# Patient Record
Sex: MALE | Race: BLACK OR AFRICAN AMERICAN | URBAN - METROPOLITAN AREA
[De-identification: names, ages, dates, MRNs, and addresses within clinical notes are randomized per-mention and may not be internally consistent; named-entity substitution may affect disease eponyms.]

---

## 2022-03-31 ENCOUNTER — INPATIENT (INPATIENT)
Facility: HOSPITAL | Age: 32
LOS: 3 days | Discharge: ANOTHER HOSPITAL | DRG: 305 | End: 2022-04-04
Attending: INTERNAL MEDICINE | Admitting: INTERNAL MEDICINE
Payer: MEDICAID

## 2022-03-31 VITALS
OXYGEN SATURATION: 95 % | RESPIRATION RATE: 20 BRPM | WEIGHT: 315 LBS | HEART RATE: 109 BPM | DIASTOLIC BLOOD PRESSURE: 112 MMHG | SYSTOLIC BLOOD PRESSURE: 188 MMHG | TEMPERATURE: 98 F

## 2022-03-31 DIAGNOSIS — E11.9 TYPE 2 DIABETES MELLITUS WITHOUT COMPLICATIONS: ICD-10-CM

## 2022-03-31 DIAGNOSIS — N17.9 ACUTE KIDNEY FAILURE, UNSPECIFIED: ICD-10-CM

## 2022-03-31 DIAGNOSIS — Z29.9 ENCOUNTER FOR PROPHYLACTIC MEASURES, UNSPECIFIED: ICD-10-CM

## 2022-03-31 DIAGNOSIS — I10 ESSENTIAL (PRIMARY) HYPERTENSION: ICD-10-CM

## 2022-03-31 DIAGNOSIS — I50.9 HEART FAILURE, UNSPECIFIED: ICD-10-CM

## 2022-03-31 DIAGNOSIS — R06.00 DYSPNEA, UNSPECIFIED: ICD-10-CM

## 2022-03-31 LAB
ALBUMIN SERPL ELPH-MCNC: 2.5 G/DL — LOW (ref 3.5–5)
ALP SERPL-CCNC: 85 U/L — SIGNIFICANT CHANGE UP (ref 40–120)
ALT FLD-CCNC: 54 U/L DA — SIGNIFICANT CHANGE UP (ref 10–60)
ANION GAP SERPL CALC-SCNC: 2 MMOL/L — LOW (ref 5–17)
AST SERPL-CCNC: 42 U/L — HIGH (ref 10–40)
BASOPHILS # BLD AUTO: 0.03 K/UL — SIGNIFICANT CHANGE UP (ref 0–0.2)
BASOPHILS NFR BLD AUTO: 0.3 % — SIGNIFICANT CHANGE UP (ref 0–2)
BILIRUB SERPL-MCNC: 0.6 MG/DL — SIGNIFICANT CHANGE UP (ref 0.2–1.2)
BUN SERPL-MCNC: 31 MG/DL — HIGH (ref 7–18)
CALCIUM SERPL-MCNC: 8.7 MG/DL — SIGNIFICANT CHANGE UP (ref 8.4–10.5)
CHLORIDE SERPL-SCNC: 113 MMOL/L — HIGH (ref 96–108)
CO2 SERPL-SCNC: 25 MMOL/L — SIGNIFICANT CHANGE UP (ref 22–31)
CREAT SERPL-MCNC: 1.83 MG/DL — HIGH (ref 0.5–1.3)
D DIMER BLD IA.RAPID-MCNC: 355 NG/ML DDU — HIGH
EGFR: 50 ML/MIN/1.73M2 — LOW
EOSINOPHIL # BLD AUTO: 0.18 K/UL — SIGNIFICANT CHANGE UP (ref 0–0.5)
EOSINOPHIL NFR BLD AUTO: 2 % — SIGNIFICANT CHANGE UP (ref 0–6)
GLUCOSE BLDC GLUCOMTR-MCNC: 176 MG/DL — HIGH (ref 70–99)
GLUCOSE SERPL-MCNC: 298 MG/DL — HIGH (ref 70–99)
HCT VFR BLD CALC: 33 % — LOW (ref 39–50)
HGB BLD-MCNC: 11.7 G/DL — LOW (ref 13–17)
IMM GRANULOCYTES NFR BLD AUTO: 0.3 % — SIGNIFICANT CHANGE UP (ref 0–1.5)
LYMPHOCYTES # BLD AUTO: 1.6 K/UL — SIGNIFICANT CHANGE UP (ref 1–3.3)
LYMPHOCYTES # BLD AUTO: 18.1 % — SIGNIFICANT CHANGE UP (ref 13–44)
MCHC RBC-ENTMCNC: 31.5 PG — SIGNIFICANT CHANGE UP (ref 27–34)
MCHC RBC-ENTMCNC: 35.5 GM/DL — SIGNIFICANT CHANGE UP (ref 32–36)
MCV RBC AUTO: 88.7 FL — SIGNIFICANT CHANGE UP (ref 80–100)
MONOCYTES # BLD AUTO: 0.65 K/UL — SIGNIFICANT CHANGE UP (ref 0–0.9)
MONOCYTES NFR BLD AUTO: 7.4 % — SIGNIFICANT CHANGE UP (ref 2–14)
NEUTROPHILS # BLD AUTO: 6.33 K/UL — SIGNIFICANT CHANGE UP (ref 1.8–7.4)
NEUTROPHILS NFR BLD AUTO: 71.9 % — SIGNIFICANT CHANGE UP (ref 43–77)
NRBC # BLD: 0 /100 WBCS — SIGNIFICANT CHANGE UP (ref 0–0)
NT-PROBNP SERPL-SCNC: 2900 PG/ML — HIGH (ref 0–125)
PLATELET # BLD AUTO: 269 K/UL — SIGNIFICANT CHANGE UP (ref 150–400)
POTASSIUM SERPL-MCNC: 4.6 MMOL/L — SIGNIFICANT CHANGE UP (ref 3.5–5.3)
POTASSIUM SERPL-SCNC: 4.6 MMOL/L — SIGNIFICANT CHANGE UP (ref 3.5–5.3)
PROT SERPL-MCNC: 6.6 G/DL — SIGNIFICANT CHANGE UP (ref 6–8.3)
RAPID RVP RESULT: SIGNIFICANT CHANGE UP
RBC # BLD: 3.72 M/UL — LOW (ref 4.2–5.8)
RBC # FLD: 11.8 % — SIGNIFICANT CHANGE UP (ref 10.3–14.5)
SARS-COV-2 RNA SPEC QL NAA+PROBE: SIGNIFICANT CHANGE UP
SODIUM SERPL-SCNC: 140 MMOL/L — SIGNIFICANT CHANGE UP (ref 135–145)
TROPONIN I, HIGH SENSITIVITY RESULT: 119.2 NG/L — HIGH
TROPONIN I, HIGH SENSITIVITY RESULT: 83.6 NG/L — HIGH
TROPONIN I, HIGH SENSITIVITY RESULT: 88.6 NG/L — HIGH
WBC # BLD: 8.82 K/UL — SIGNIFICANT CHANGE UP (ref 3.8–10.5)
WBC # FLD AUTO: 8.82 K/UL — SIGNIFICANT CHANGE UP (ref 3.8–10.5)

## 2022-03-31 PROCEDURE — 71045 X-RAY EXAM CHEST 1 VIEW: CPT | Mod: 26

## 2022-03-31 PROCEDURE — 93010 ELECTROCARDIOGRAM REPORT: CPT

## 2022-03-31 PROCEDURE — 99285 EMERGENCY DEPT VISIT HI MDM: CPT

## 2022-03-31 RX ORDER — FUROSEMIDE 40 MG
40 TABLET ORAL
Refills: 0 | Status: DISCONTINUED | OUTPATIENT
Start: 2022-03-31 | End: 2022-04-04

## 2022-03-31 RX ORDER — LABETALOL HCL 100 MG
5 TABLET ORAL ONCE
Refills: 0 | Status: COMPLETED | OUTPATIENT
Start: 2022-03-31 | End: 2022-03-31

## 2022-03-31 RX ORDER — INSULIN DETEMIR 100/ML (3)
30 INSULIN PEN (ML) SUBCUTANEOUS ONCE
Refills: 0 | Status: DISCONTINUED | OUTPATIENT
Start: 2022-03-31 | End: 2022-03-31

## 2022-03-31 RX ORDER — ASPIRIN/CALCIUM CARB/MAGNESIUM 324 MG
81 TABLET ORAL DAILY
Refills: 0 | Status: DISCONTINUED | OUTPATIENT
Start: 2022-03-31 | End: 2022-04-04

## 2022-03-31 RX ORDER — ATORVASTATIN CALCIUM 80 MG/1
40 TABLET, FILM COATED ORAL AT BEDTIME
Refills: 0 | Status: DISCONTINUED | OUTPATIENT
Start: 2022-03-31 | End: 2022-04-04

## 2022-03-31 RX ORDER — INSULIN LISPRO 100/ML
VIAL (ML) SUBCUTANEOUS
Refills: 0 | Status: DISCONTINUED | OUTPATIENT
Start: 2022-03-31 | End: 2022-04-04

## 2022-03-31 RX ORDER — INSULIN GLARGINE 100 [IU]/ML
30 INJECTION, SOLUTION SUBCUTANEOUS ONCE
Refills: 0 | Status: COMPLETED | OUTPATIENT
Start: 2022-03-31 | End: 2022-03-31

## 2022-03-31 RX ORDER — INSULIN GLARGINE 100 [IU]/ML
30 INJECTION, SOLUTION SUBCUTANEOUS AT BEDTIME
Refills: 0 | Status: DISCONTINUED | OUTPATIENT
Start: 2022-03-31 | End: 2022-04-01

## 2022-03-31 RX ORDER — FUROSEMIDE 40 MG
40 TABLET ORAL ONCE
Refills: 0 | Status: COMPLETED | OUTPATIENT
Start: 2022-03-31 | End: 2022-03-31

## 2022-03-31 RX ORDER — HYDRALAZINE HCL 50 MG
10 TABLET ORAL ONCE
Refills: 0 | Status: COMPLETED | OUTPATIENT
Start: 2022-03-31 | End: 2022-03-31

## 2022-03-31 RX ORDER — LISINOPRIL 2.5 MG/1
40 TABLET ORAL ONCE
Refills: 0 | Status: COMPLETED | OUTPATIENT
Start: 2022-03-31 | End: 2022-03-31

## 2022-03-31 RX ORDER — ENOXAPARIN SODIUM 100 MG/ML
40 INJECTION SUBCUTANEOUS EVERY 24 HOURS
Refills: 0 | Status: DISCONTINUED | OUTPATIENT
Start: 2022-03-31 | End: 2022-04-03

## 2022-03-31 RX ADMIN — Medication 10 MILLIGRAM(S): at 16:44

## 2022-03-31 RX ADMIN — ATORVASTATIN CALCIUM 40 MILLIGRAM(S): 80 TABLET, FILM COATED ORAL at 22:10

## 2022-03-31 RX ADMIN — INSULIN GLARGINE 30 UNIT(S): 100 INJECTION, SOLUTION SUBCUTANEOUS at 22:10

## 2022-03-31 RX ADMIN — Medication 5 MILLIGRAM(S): at 20:05

## 2022-03-31 RX ADMIN — INSULIN GLARGINE 30 UNIT(S): 100 INJECTION, SOLUTION SUBCUTANEOUS at 14:34

## 2022-03-31 RX ADMIN — ENOXAPARIN SODIUM 40 MILLIGRAM(S): 100 INJECTION SUBCUTANEOUS at 22:10

## 2022-03-31 RX ADMIN — Medication 40 MILLIGRAM(S): at 18:40

## 2022-03-31 RX ADMIN — Medication 10 MILLIGRAM(S): at 18:04

## 2022-03-31 RX ADMIN — LISINOPRIL 40 MILLIGRAM(S): 2.5 TABLET ORAL at 13:43

## 2022-03-31 NOTE — H&P ADULT - PROBLEM SELECTOR PLAN 1
Presents with dyspnea and cough  CHF vs CAD vs PNA   T1 83.6, T2 88.6, Follow T3  EKG: NSR  Pro BNP 2900  CXR: increased vascular markings Presents with dyspnea and cough  CHF vs CAD vs PNA   T1 83.6, T2 88.6, Follow T3  EKG: NSR  Pro BNP 2900  CXR: increased vascular markings  Follow dimer  Will start on Lasix 40IV BID  Aspirin and atorvastatin   Follow Echo   Dr. Evangelista consulted Presents with dyspnea and cough  CHF vs CAD vs PNA   T1 83.6, T2 88.6, Follow T3  EKG: NSR  Pro BNP 2900  CXR: increased vascular markings  Follow dimer  Will start on Lasix 40IV BID  Aspirin and atorvastatin   Follow Echo   Follow A1c, lipid panel and TSH  Dr. Evangelista consulted

## 2022-03-31 NOTE — ED ADULT NURSE NOTE - NS ED NURSE REPORT GIVEN TO FT
Bedside shift change report given to Yuan Ruiz 44 (oncoming nurse) by Arina Polanco RN (offgoing nurse). Report included the following information SBAR, Kardex, ED Summary, Intake/Output, MAR, Accordion, Recent Results and Med Rec Status. JACKIE Jordan

## 2022-03-31 NOTE — ED PROVIDER NOTE - CARE PLAN
1 Principal Discharge DX:	Dyspnea due to congestive heart failure  Secondary Diagnosis:	SUSANA (acute kidney injury)  Secondary Diagnosis:	Hyperglycemia

## 2022-03-31 NOTE — H&P ADULT - PROBLEM SELECTOR PLAN 2
BUN/Cr: 31/1.86  Likely prerenal   Avoid nephrotoxins  Continue to monitor BMP daily BUN/Cr: 31/1.86  Likely prerenal   Avoid nephrotoxins  Continue to monitor BMP daily  Follow UA

## 2022-03-31 NOTE — H&P ADULT - ASSESSMENT
33 y/o Patient with PMH of HTN, HLD, Varicose veins presents to the ED for SOB and cough.  Admitted for SOB

## 2022-03-31 NOTE — ED PROVIDER NOTE - CLINICAL SUMMARY MEDICAL DECISION MAKING FREE TEXT BOX
Patient with cough, sob that resolved, tachycardia, hypertension. Will check FSBS, core temp, EKG, labs, CXR, reassess.

## 2022-03-31 NOTE — H&P ADULT - ATTENDING COMMENTS
31 y/o Patient with PMH of HTN, HLD, Varicose veins presents to the ED for SOB and cough.  He states he was about to take off on a flight to [place] when he started having SOB and a cough that lasted several minutes, productive of clear sputum. He was taken off the flight and sent the ED. He has not been taking his medications for 2 months. He endorses chronic LE edema, Orthopnea and PND. Denies chest pain, fever, chills, abd pain, N/V/D, or any other complaints. Does not smoke, use ETOH or drugs. Supposed to take lisinopril 40mg, lantus 30 units, and lispro 15 units with meals.    # SUSPECT NEW ONSET CHF  # HYPERTENSIVE URGENCY, HTN  # B/L LE EDEMA - SUSPECT S/T CHF AND ?VARICOSE VEINS  - MONITOR ON TELEMETRY, F/U TROPONINS  - STARTED ON LASIX, F/U ECHOCARDIOGRAM  - F/U TSH/LIPID PANEL/HBA1C  - CARDIOLOGY CONSULT    # SUSANA - F/U UA, F/U BLADDER SCAN, MONITOR CR, AVOID NEPHROTOXIC AGENTS    # DM2, NONCOMPLIANT W/ INSULIN - F/U HBA1C, SSI + FS    # HLD - F/U LIPID PANEL    # GI AND DVT PPX

## 2022-03-31 NOTE — H&P ADULT - HISTORY OF PRESENT ILLNESS
33 y/o Patient with PMH of HTN, HLD, Varicose veins presents to the ED for SOB and cough.  He states he was about to take off on a flight to [place] when he started having SOB and a cough that lasted several minutes, productive of clear sputum. He was taken off the flight and sent the ED. He has not been taking his medications for 2 months. He endorses chronic LE edema, Orthopnea and PND. Denies chest pain, fever, chills, abd pain, N/V/D, or any other complaints. Does not smoke, use ETOH or drugs. Supposed to take lisinopril 40mg, lantus 30 units, and lispro 15 units with meals.

## 2022-03-31 NOTE — ED PROVIDER NOTE - OBJECTIVE STATEMENT
Patient reports he was on a flight about to take off. Had a coughing fit that lasted several minutes, productive of clear sputum. Felt short of breath while coughing. Feels better now. Found to be hypertensive here. No fever, ha, cp, ap, n/v/d. Has not taken his blood pressure medicine in 1 month and his insulin in 2 months. Supposed to take lisinopril 40mg, levemir 30 units, and Novolog 15 units with meals.

## 2022-03-31 NOTE — PATIENT PROFILE ADULT - FALL HARM RISK - UNIVERSAL INTERVENTIONS
Bed in lowest position, wheels locked, appropriate side rails in place/Call bell, personal items and telephone in reach/Instruct patient to call for assistance before getting out of bed or chair/Non-slip footwear when patient is out of bed/[place] to call system/Physically safe environment - no spills, clutter or unnecessary equipment/Purposeful Proactive Rounding/Room/bathroom lighting operational, light cord in reach

## 2022-03-31 NOTE — H&P ADULT - NSHPPHYSICALEXAM_GEN_ALL_CORE
LOS:     VITALS:   T(C): 36.6 (03-31-22 @ 18:56), Max: 36.8 (03-31-22 @ 12:27)  HR: 103 (03-31-22 @ 18:56) (101 - 109)  BP: 175/115 (03-31-22 @ 18:56) (175/115 - 194/146)  RR: 19 (03-31-22 @ 18:56) (19 - 20)  SpO2: 98% (03-31-22 @ 18:56) (95% - 98%)    GENERAL: NAD, lying in bed comfortably  HEAD:  Atraumatic, Normocephalic  EYES: EOMI, PERRLA, conjunctiva and sclera clear  ENT: Moist mucous membranes  NECK: Supple, No JVD  CHEST/LUNG: Clear to auscultation bilaterally; No rales, rhonchi, wheezing, or rubs. Unlabored respirations  HEART: Regular rate and rhythm; No murmurs, rubs, or gallops  ABDOMEN: BSx4; Soft, nontender, nondistended  EXTREMITIES:  LE edema, nonpitting 2+ Peripheral Pulses, brisk capillary refill. No clubbing, cyanosis  NERVOUS SYSTEM:  A&Ox3,  SKIN: No rashes or lesions

## 2022-04-01 LAB
A1C WITH ESTIMATED AVERAGE GLUCOSE RESULT: 9.4 % — HIGH (ref 4–5.6)
ALBUMIN SERPL ELPH-MCNC: 2.2 G/DL — LOW (ref 3.5–5)
ALP SERPL-CCNC: 71 U/L — SIGNIFICANT CHANGE UP (ref 40–120)
ALT FLD-CCNC: 41 U/L DA — SIGNIFICANT CHANGE UP (ref 10–60)
ANION GAP SERPL CALC-SCNC: 7 MMOL/L — SIGNIFICANT CHANGE UP (ref 5–17)
AST SERPL-CCNC: 36 U/L — SIGNIFICANT CHANGE UP (ref 10–40)
BILIRUB SERPL-MCNC: 0.7 MG/DL — SIGNIFICANT CHANGE UP (ref 0.2–1.2)
BUN SERPL-MCNC: 27 MG/DL — HIGH (ref 7–18)
CALCIUM SERPL-MCNC: 8.6 MG/DL — SIGNIFICANT CHANGE UP (ref 8.4–10.5)
CHLORIDE SERPL-SCNC: 106 MMOL/L — SIGNIFICANT CHANGE UP (ref 96–108)
CHOLEST SERPL-MCNC: 252 MG/DL — HIGH
CO2 SERPL-SCNC: 26 MMOL/L — SIGNIFICANT CHANGE UP (ref 22–31)
CREAT SERPL-MCNC: 1.65 MG/DL — HIGH (ref 0.5–1.3)
EGFR: 56 ML/MIN/1.73M2 — LOW
ESTIMATED AVERAGE GLUCOSE: 223 MG/DL — HIGH (ref 68–114)
GLUCOSE BLDC GLUCOMTR-MCNC: 102 MG/DL — HIGH (ref 70–99)
GLUCOSE BLDC GLUCOMTR-MCNC: 164 MG/DL — HIGH (ref 70–99)
GLUCOSE BLDC GLUCOMTR-MCNC: 189 MG/DL — HIGH (ref 70–99)
GLUCOSE BLDC GLUCOMTR-MCNC: 99 MG/DL — SIGNIFICANT CHANGE UP (ref 70–99)
GLUCOSE SERPL-MCNC: 89 MG/DL — SIGNIFICANT CHANGE UP (ref 70–99)
HCT VFR BLD CALC: 31.4 % — LOW (ref 39–50)
HDLC SERPL-MCNC: 62 MG/DL — SIGNIFICANT CHANGE UP
HGB BLD-MCNC: 10.9 G/DL — LOW (ref 13–17)
LIPID PNL WITH DIRECT LDL SERPL: 170 MG/DL — HIGH
MAGNESIUM SERPL-MCNC: 1.9 MG/DL — SIGNIFICANT CHANGE UP (ref 1.6–2.6)
MCHC RBC-ENTMCNC: 31.2 PG — SIGNIFICANT CHANGE UP (ref 27–34)
MCHC RBC-ENTMCNC: 34.7 GM/DL — SIGNIFICANT CHANGE UP (ref 32–36)
MCV RBC AUTO: 90 FL — SIGNIFICANT CHANGE UP (ref 80–100)
NON HDL CHOLESTEROL: 190 MG/DL — HIGH
NRBC # BLD: 0 /100 WBCS — SIGNIFICANT CHANGE UP (ref 0–0)
PHOSPHATE SERPL-MCNC: 4.9 MG/DL — HIGH (ref 2.5–4.5)
PLATELET # BLD AUTO: 272 K/UL — SIGNIFICANT CHANGE UP (ref 150–400)
POTASSIUM SERPL-MCNC: 4.3 MMOL/L — SIGNIFICANT CHANGE UP (ref 3.5–5.3)
POTASSIUM SERPL-SCNC: 4.3 MMOL/L — SIGNIFICANT CHANGE UP (ref 3.5–5.3)
PROT SERPL-MCNC: 5.8 G/DL — LOW (ref 6–8.3)
RBC # BLD: 3.49 M/UL — LOW (ref 4.2–5.8)
RBC # FLD: 11.8 % — SIGNIFICANT CHANGE UP (ref 10.3–14.5)
SODIUM SERPL-SCNC: 139 MMOL/L — SIGNIFICANT CHANGE UP (ref 135–145)
TRIGL SERPL-MCNC: 98 MG/DL — SIGNIFICANT CHANGE UP
TSH SERPL-MCNC: 3.51 UU/ML — SIGNIFICANT CHANGE UP (ref 0.34–4.82)
WBC # BLD: 7.38 K/UL — SIGNIFICANT CHANGE UP (ref 3.8–10.5)
WBC # FLD AUTO: 7.38 K/UL — SIGNIFICANT CHANGE UP (ref 3.8–10.5)

## 2022-04-01 PROCEDURE — 93970 EXTREMITY STUDY: CPT | Mod: 26

## 2022-04-01 PROCEDURE — 78582 LUNG VENTILAT&PERFUS IMAGING: CPT | Mod: 26

## 2022-04-01 RX ORDER — INSULIN GLARGINE 100 [IU]/ML
15 INJECTION, SOLUTION SUBCUTANEOUS AT BEDTIME
Refills: 0 | Status: DISCONTINUED | OUTPATIENT
Start: 2022-04-01 | End: 2022-04-04

## 2022-04-01 RX ORDER — HYDRALAZINE HCL 50 MG
10 TABLET ORAL ONCE
Refills: 0 | Status: COMPLETED | OUTPATIENT
Start: 2022-04-01 | End: 2022-04-01

## 2022-04-01 RX ADMIN — Medication 40 MILLIGRAM(S): at 05:01

## 2022-04-01 RX ADMIN — ATORVASTATIN CALCIUM 40 MILLIGRAM(S): 80 TABLET, FILM COATED ORAL at 21:24

## 2022-04-01 RX ADMIN — Medication 10 MILLIGRAM(S): at 22:19

## 2022-04-01 RX ADMIN — ENOXAPARIN SODIUM 40 MILLIGRAM(S): 100 INJECTION SUBCUTANEOUS at 21:25

## 2022-04-01 RX ADMIN — Medication 1: at 12:02

## 2022-04-01 RX ADMIN — INSULIN GLARGINE 30 UNIT(S): 100 INJECTION, SOLUTION SUBCUTANEOUS at 21:25

## 2022-04-01 RX ADMIN — Medication 81 MILLIGRAM(S): at 12:23

## 2022-04-01 RX ADMIN — Medication 40 MILLIGRAM(S): at 17:26

## 2022-04-01 NOTE — CONSULT NOTE ADULT - SUBJECTIVE AND OBJECTIVE BOX
C A R D I O L O G Y  *********************    DATE OF SERVICE: 04-01-22    HISTORY OF PRESENT ILLNESS: HPI:  31 y/o Patient with PMH of HTN, HLD, Varicose veins presents to the ED for SOB and cough.  He states he was about to take off on a flight to [place] when he started having SOB and a cough that lasted several minutes, productive of clear sputum. He was taken off the flight and sent the ED. He has not been taking his medications for 2 months. He endorses chronic LE edema, Orthopnea and PND. Denies chest pain, fever, chills, abd pain, N/V/D, or any other complaints. Does not smoke, use ETOH or drugs. Supposed to take lisinopril 40mg, lantus 30 units, and lispro 15 units with meals. (31 Mar 2022 19:04)      PAST MEDICAL & SURGICAL HISTORY:    Diabetes mellitus  Hypertension      MEDICATIONS:  MEDICATIONS  (STANDING):  aspirin enteric coated 81 milliGRAM(s) Oral daily  atorvastatin 40 milliGRAM(s) Oral at bedtime  enoxaparin Injectable 40 milliGRAM(s) SubCutaneous every 24 hours  furosemide   Injectable 40 milliGRAM(s) IV Push two times a day  insulin glargine Injectable (LANTUS) 30 Unit(s) SubCutaneous at bedtime  insulin lispro (ADMELOG) corrective regimen sliding scale   SubCutaneous three times a day before meals      Allergies    Keflex (Hives)    Intolerances        FAMILY HISTORY:    Non-contributary for premature coronary disease or sudden cardiac death    SOCIAL HISTORY:    [X ] Non-smoker  [ ] Smoker  [ ] Alcohol      REVIEW OF SYSTEMS:  [ ]chest pain  [ X ]shortness of breath  [  ]palpitations  [  ]syncope  [ ]near syncope [ ]upper extremity weakness   [ ] lower extremity weakness  [  ]diplopia  [  ]altered mental status   [  ]fevers  [ ]chills [ ]nausea  [ ]vomitting  [  ]dysphagia    [ ]abdominal pain  [ ]melena  [ ]BRBPR    [  ]epistaxis  [  ]rash    [ X]lower extremity edema        [X] All others negative	  [ ] Unable to obtain      LABS:	 	    CARDIAC MARKERS:        Troponin I, High Sensitivity Result: 119.2 ng/L (03-31-22 @ 20:01)  Troponin I, High Sensitivity Result: 88.6 ng/L (03-31-22 @ 16:19)  Troponin I, High Sensitivity Result: 83.6 ng/L (03-31-22 @ 13:37)                            10.9   7.38  )-----------( 272      ( 01 Apr 2022 07:43 )             31.4     Hb Trend: 10.9<--, 11.7<--    04-01    139  |  106  |  27<H>  ----------------------------<  89  4.3   |  26  |  1.65<H>    Ca    8.6      01 Apr 2022 07:43  Phos  4.9     04-01  Mg     1.9     04-01    TPro  5.8<L>  /  Alb  2.2<L>  /  TBili  0.7  /  DBili  x   /  AST  36  /  ALT  41  /  AlkPhos  71  04-01    Creatinine Trend: 1.65<--, 1.83<--    proBNP: Serum Pro-Brain Natriuretic Peptide: 2900 pg/mL (03-31 @ 13:37)      TSH: Thyroid Stimulating Hormone, Serum: 3.51 uU/mL (04-01 @ 07:43)        PHYSICAL EXAM:  T(C): 36.8 (04-01-22 @ 10:16), Max: 36.8 (03-31-22 @ 12:27)  HR: 95 (04-01-22 @ 10:16) (94 - 109)  BP: 178/111 (04-01-22 @ 10:16) (160/110 - 194/146)  RR: 16 (04-01-22 @ 10:16) (16 - 20)  SpO2: 98% (04-01-22 @ 10:16) (95% - 98%)  Wt(kg): --   BMI (kg/m2): 49.7 (03-31-22 @ 21:42)  I&O's Summary      Gen: Appears well in NAD  HEENT:  (-)icterus (-)pallor  CV: N S1 S2 1/6 CAMRON (+)2 Pulses B/l  Resp:  Clear to ausculatation B/L, normal effort  GI: (+) BS Soft, NT, ND  Lymph:  (+)Edema, (-)obvious lymphadenopathy  Skin: Warm to touch, Normal turgor  Psych: Appropriate mood and affect        ECG:  	Sinus Tach 105 BPM    RADIOLOGY:         CXR:  no infiltrate     ASSESSMENT/PLAN: 	32y Male  PMH of HTN, HLD, Varicose veins presents to the ED for SOB and cough positive trop elevated BNP.    - Elevated Ddimmer, VQ scan (-) f/u lower extremity dopplers  - Echo  - IV lasix to keep net negative  - will need ischemic eval pending echo.  If LV function is depressed plan for cath.    I once again thank you for allowing me to participate in the care of your patient.  If you have any questions or concerns please do not hesitate to contact me.    Zion Evangelista MD, [facility]  BEEPER [phone]       C A R D I O L O G Y  *********************    DATE OF SERVICE: 04-01-22    HISTORY OF PRESENT ILLNESS: HPI:  33 y/o Patient with PMH of HTN, HLD, Varicose veins presents to the ED for SOB and cough.  He states he was about to take off on a flight to [place] when he started having SOB and a cough that lasted several minutes, productive of clear sputum. He was taken off the flight and sent the ED. He has not been taking his medications for 2 months. He endorses chronic LE edema, Orthopnea and PND. Denies chest pain, fever, chills, abd pain, N/V/D, or any other complaints. Does not smoke, use ETOH or drugs. Supposed to take lisinopril 40mg, lantus 30 units, and lispro 15 units with meals. (31 Mar 2022 19:04)      PAST MEDICAL & SURGICAL HISTORY:    Diabetes mellitus  Hypertension      MEDICATIONS:  MEDICATIONS  (STANDING):  aspirin enteric coated 81 milliGRAM(s) Oral daily  atorvastatin 40 milliGRAM(s) Oral at bedtime  enoxaparin Injectable 40 milliGRAM(s) SubCutaneous every 24 hours  furosemide   Injectable 40 milliGRAM(s) IV Push two times a day  insulin glargine Injectable (LANTUS) 30 Unit(s) SubCutaneous at bedtime  insulin lispro (ADMELOG) corrective regimen sliding scale   SubCutaneous three times a day before meals      Allergies    Keflex (Hives)    Intolerances        FAMILY HISTORY:    Non-contributary for premature coronary disease or sudden cardiac death    SOCIAL HISTORY:    [X ] Non-smoker  [ ] Smoker  [ ] Alcohol      REVIEW OF SYSTEMS:  [ ]chest pain  [ X ]shortness of breath  [  ]palpitations  [  ]syncope  [ ]near syncope [ ]upper extremity weakness   [ ] lower extremity weakness  [  ]diplopia  [  ]altered mental status   [  ]fevers  [ ]chills [ ]nausea  [ ]vomitting  [  ]dysphagia    [ ]abdominal pain  [ ]melena  [ ]BRBPR    [  ]epistaxis  [  ]rash    [ X]lower extremity edema        [X] All others negative	  [ ] Unable to obtain      LABS:	 	    CARDIAC MARKERS:        Troponin I, High Sensitivity Result: 119.2 ng/L (03-31-22 @ 20:01)  Troponin I, High Sensitivity Result: 88.6 ng/L (03-31-22 @ 16:19)  Troponin I, High Sensitivity Result: 83.6 ng/L (03-31-22 @ 13:37)                            10.9   7.38  )-----------( 272      ( 01 Apr 2022 07:43 )             31.4     Hb Trend: 10.9<--, 11.7<--    04-01    139  |  106  |  27<H>  ----------------------------<  89  4.3   |  26  |  1.65<H>    Ca    8.6      01 Apr 2022 07:43  Phos  4.9     04-01  Mg     1.9     04-01    TPro  5.8<L>  /  Alb  2.2<L>  /  TBili  0.7  /  DBili  x   /  AST  36  /  ALT  41  /  AlkPhos  71  04-01    Creatinine Trend: 1.65<--, 1.83<--    proBNP: Serum Pro-Brain Natriuretic Peptide: 2900 pg/mL (03-31 @ 13:37)      TSH: Thyroid Stimulating Hormone, Serum: 3.51 uU/mL (04-01 @ 07:43)        PHYSICAL EXAM:  T(C): 36.8 (04-01-22 @ 10:16), Max: 36.8 (03-31-22 @ 12:27)  HR: 95 (04-01-22 @ 10:16) (94 - 109)  BP: 178/111 (04-01-22 @ 10:16) (160/110 - 194/146)  RR: 16 (04-01-22 @ 10:16) (16 - 20)  SpO2: 98% (04-01-22 @ 10:16) (95% - 98%)  Wt(kg): --   BMI (kg/m2): 49.7 (03-31-22 @ 21:42)  I&O's Summary      Gen: Appears well in NAD  HEENT:  (-)icterus (-)pallor  CV: N S1 S2 1/6 CAMRON (+)2 Pulses B/l  Resp:  Clear to ausculatation B/L, normal effort  GI: (+) BS Soft, NT, ND  Lymph:  (+)Edema, (-)obvious lymphadenopathy  Skin: Warm to touch, Normal turgor  Psych: Appropriate mood and affect        ECG:  	Sinus Tach 105 BPM    RADIOLOGY:         CXR:  no infiltrate     ASSESSMENT/PLAN: 	32y Male  PMH of HTN, HLD, Varicose veins presents to the ED for SOB and cough positive trop elevated BNP.    - Elevated Ddimmer, VQ scan (-) f/u lower extremity dopplers  - Echo  - IV lasix to keep net negative  - will need ischemic eval pending echo.  If LV function is depressed will need cath.    I once again thank you for allowing me to participate in the care of your patient.  If you have any questions or concerns please do not hesitate to contact me.    Zion Evangelista MD, [facility]  BEEPER [phone]

## 2022-04-01 NOTE — CHART NOTE - NSCHARTNOTEFT_GEN_A_CORE
EVENT: Vitals reviewed. /114.    HPI: 31 y/o Patient with PMH of HTN, HLD, Varicose veins presents to the ED for SOB and cough.  Admitted for SOB.    SUBJECTIVE: n/a    OBJECTIVE:  Vital Signs Last 24 Hrs  T(C): 36.7 (01 Apr 2022 21:02), Max: 36.8 (01 Apr 2022 10:16)  T(F): 98 (01 Apr 2022 21:02), Max: 98.2 (01 Apr 2022 10:16)  HR: 96 (01 Apr 2022 21:02) (94 - 96)  BP: 166/114 (01 Apr 2022 21:02) (161/81 - 178/111)  BP(mean): --  RR: 18 (01 Apr 2022 21:02) (16 - 18)  SpO2: 100% (01 Apr 2022 21:02) (98% - 100%)    FOCUSED PHYSICAL EXAM:  Neuro: awake, alert, oriented x 3. No neuro deficit  Cardiovascular: Pulses +2 B/L in lower and upper extremities, HR regular, BP stable, No edema.  Respiratory: Respirations regular, unlabored, breath sounds clear B/L.   GI: Abdomen soft, non-tender, positive bowel sounds.  : no bladder distention noted. No complaints at this time.  Skin: Dry, intact, no bruising, no diaphoresis.    LABS:                        10.9   7.38  )-----------( 272      ( 01 Apr 2022 07:43 )             31.4     04-01    139  |  106  |  27<H>  ----------------------------<  89  4.3   |  26  |  1.65<H>    Ca    8.6      01 Apr 2022 07:43  Phos  4.9     04-01  Mg     1.9     04-01    TPro  5.8<L>  /  Alb  2.2<L>  /  TBili  0.7  /  DBili  x   /  AST  36  /  ALT  41  /  AlkPhos  71  04-01      EKG:   IMAGING:    ASSESSMENT/PROBLEM: Uncontrolled hypertension      PLAN:   1. Hydralazine 10mg, IVP x 1 dose ordered  2. Monitor response to treatment  3. Cont present care/treatment  4. Supportive care

## 2022-04-01 NOTE — PROGRESS NOTE ADULT - SUBJECTIVE AND OBJECTIVE BOX
HPI:  33 y/o Patient with PMH of HTN, HLD, Varicose veins presents to the ED for SOB and cough.  He states he was about to take off on a flight to [place] when he started having SOB and a cough that lasted several minutes, productive of clear sputum. He was taken off the flight and sent the ED. He has not been taking his medications for 2 months. He endorses chronic LE edema, Orthopnea and PND. Denies chest pain, fever, chills, abd pain, N/V/D, or any other complaints. Does not smoke, use ETOH or drugs. Supposed to take lisinopril 40mg, lantus 30 units, and lispro 15 units with meals. (31 Mar 2022 19:04)    Patient is a 32y old  Male who presents with a chief complaint of SOB (01 Apr 2022 11:03)    INTERVAL HPI/OVERNIGHT EVENTS:  T(C): 36.8 (04-01-22 @ 10:16), Max: 36.8 (03-31-22 @ 12:27)  HR: 95 (04-01-22 @ 10:16) (94 - 109)  BP: 178/111 (04-01-22 @ 10:16) (160/110 - 194/146)  RR: 16 (04-01-22 @ 10:16) (16 - 20)  SpO2: 98% (04-01-22 @ 10:16) (95% - 98%)  Wt(kg): --  I&O's Summary      REVIEW OF SYSTEMS: denies fever, chills, SOB, palpitations, chest pain, abdominal pain, nausea, vomitting, diarrhea, constipation, dizziness    MEDICATIONS  (STANDING):  aspirin enteric coated 81 milliGRAM(s) Oral daily  atorvastatin 40 milliGRAM(s) Oral at bedtime  enoxaparin Injectable 40 milliGRAM(s) SubCutaneous every 24 hours  furosemide   Injectable 40 milliGRAM(s) IV Push two times a day  insulin glargine Injectable (LANTUS) 30 Unit(s) SubCutaneous at bedtime  insulin lispro (ADMELOG) corrective regimen sliding scale   SubCutaneous three times a day before meals    MEDICATIONS  (PRN):      PHYSICAL EXAM:  GENERAL: NAD,obese, pleasant male  HEAD:  Atraumatic, Normocephalic  EYES: EOMI  ENMT: Nares patent, throat clear  NECK: Supple  NERVOUS SYSTEM:  Alert & Oriented X3, no focal deficits.   CHEST/LUNG: CTA  HEART: Regular rate and rhythm; No murmurs, rubs, or gallops  ABDOMEN: Soft, Nontender, Nondistended; Bowel sounds present  EXTREMITIES: BLLE lymphedema, no foot ulcers.  LYMPH: No LAD, BL LE lymphedema  SKIN: No rashes or lesions  LABS:                        10.9   7.38  )-----------( 272      ( 01 Apr 2022 07:43 )             31.4     04-01    139  |  106  |  27<H>  ----------------------------<  89  4.3   |  26  |  1.65<H>    Ca    8.6      01 Apr 2022 07:43  Phos  4.9     04-01  Mg     1.9     04-01    TPro  5.8<L>  /  Alb  2.2<L>  /  TBili  0.7  /  DBili  x   /  AST  36  /  ALT  41  /  AlkPhos  71  04-01    Troponin I, High Sensitivity Result: 83.6-->89.6-->119.2    CAPILLARY BLOOD GLUCOSE    POCT Blood Glucose.: 164 mg/dL (01 Apr 2022 11:08)  POCT Blood Glucose.: 99 mg/dL (01 Apr 2022 07:46)  POCT Blood Glucose.: 176 mg/dL (31 Mar 2022 21:49)  POCT Blood Glucose.: 305 mg/dL (31 Mar 2022 14:00)    < from: US Duplex Venous Lower Ext Complete, Bilateral (04.01.22 @ 11:24) >    ACC: [id number] EXAM:  US DPLX LWR EXT VEINS COMPL BI                          PROCEDURE DATE:  04/01/2022          INTERPRETATION:  CLINICAL INFORMATION: Shortness of breath. Diabetes.    COMPARISON: None available.    TECHNIQUE: Duplex sonography of the BILATERAL LOWER extremity veins with   color and spectral Doppler, with and without compression. The examination   is technically limited secondary to subcutaneous edema in the calves and   body habitus.    FINDINGS:    RIGHT:  Normal compressibility of the RIGHT common femoral, femoral and popliteal   veins.  Doppler examination shows normal spontaneous and phasic flow.  Calf veins not seen.    Subcutaneous edema in the calf.    LEFT:  Normal compressibility of the LEFT common femoral, femoral and popliteal   veins.  Doppler examination shows normal spontaneous and phasic flow.  Calf veins not seen.    Subcutaneous edema in the calf.    IMPRESSION:    Technically limited study including nonvisualization of the calf veins   with no evidence of deep venous thrombosis in the visualized bilateral   lower extremity veins.    --- End of Report ---            MIESHA WEATHERS MD; Attending Radiologist  This document has been electronically signed. Apr 1 2022 11:30AM    < end of copied text >    < from: NM Pulmonary Ventilation/Perfusion Scan (04.01.22 @ 10:10) >  C: [id number] EXAM:  NM PULM VENTILATION PERFUS IMG                          PROCEDURE DATE:  04/01/2022          INTERPRETATION:  CLINICAL INFORMATION: 32 year old male with dyspnea,   elevated serum creatinine level; referred to evaluate for pulmonary   embolism.    RADIOPHARMACEUTICAL: 1.0 mCi Tc-99m-DTPA via inhalation; 6.2 mCi   Tc-99m-MAA, I.V.    TECHNIQUE:  Ventilation and perfusion images of the lungs were obtained   following administration of Tc-99m-DTPA and Tc-99m-MAA. Images were   obtained in the anterior, posterior, both lateral, and all 4 oblique   projections. The study was interpreted in conjunction with a chest   radiograph of 3/31/2022.    COMPARISON: No prior lung V/Q scan.    FINDINGS: There is heterogeneous distribution of radiopharmaceutical in   the lungs on the ventilation and perfusion images. There are no segmental   perfusion defects.    IMPRESSION: Very low probability of pulmonary embolus.    --- End of Report ---            INDIRA TELLES MD; Attending Nuclear Medicine  This document has been electronically signed. Apr 1 2022 10:21AM    < end of copied text >

## 2022-04-01 NOTE — PROGRESS NOTE ADULT - PROBLEM SELECTOR PLAN 1
Presents with dyspnea and cough  CHF vs CAD vs PNA   T1 83.6, T2 88.6, Follow T3  EKG: NSR  Pro BNP 2900  CXR: increased vascular markings  Follow dimer  Will start on Lasix 40IV BID  Aspirin and atorvastatin   Follow Echo   Follow A1c, lipid panel and TSH  Dr. Evangelista recommended echo, LE doppler and VQ  VQ complete, very low probability of PE  LE doppler complete, negative for DVT  echo pending

## 2022-04-01 NOTE — PROGRESS NOTE ADULT - SUBJECTIVE AND OBJECTIVE BOX
Patient is a 32y old  Male who presents with a chief complaint of SOB (01 Apr 2022 10:29)    PATIENT IS SEEN AND EXAMINED IN MEDICAL FLOOR.  NGT [    ]    SMALL [   ]      GT [   ]    ALLERGIES:  Keflex (Hives)      Daily Height in cm: 185.42 (31 Mar 2022 21:42)    Daily     VITALS:    Vital Signs Last 24 Hrs  T(C): 36.8 (01 Apr 2022 10:16), Max: 36.8 (31 Mar 2022 12:27)  T(F): 98.2 (01 Apr 2022 10:16), Max: 98.2 (31 Mar 2022 12:27)  HR: 95 (01 Apr 2022 10:16) (94 - 109)  BP: 178/111 (01 Apr 2022 10:16) (160/110 - 194/146)  BP(mean): --  RR: 16 (01 Apr 2022 10:16) (16 - 20)  SpO2: 98% (01 Apr 2022 10:16) (95% - 98%)    LABS:    CBC Full  -  ( 01 Apr 2022 07:43 )  WBC Count : 7.38 K/uL  RBC Count : 3.49 M/uL  Hemoglobin : 10.9 g/dL  Hematocrit : 31.4 %  Platelet Count - Automated : 272 K/uL  Mean Cell Volume : 90.0 fl  Mean Cell Hemoglobin : 31.2 pg  Mean Cell Hemoglobin Concentration : 34.7 gm/dL  Auto Neutrophil # : x  Auto Lymphocyte # : x  Auto Monocyte # : x  Auto Eosinophil # : x  Auto Basophil # : x  Auto Neutrophil % : x  Auto Lymphocyte % : x  Auto Monocyte % : x  Auto Eosinophil % : x  Auto Basophil % : x      04-01    139  |  106  |  27<H>  ----------------------------<  89  4.3   |  26  |  1.65<H>    Ca    8.6      01 Apr 2022 07:43  Phos  4.9     04-01  Mg     1.9     04-01    TPro  5.8<L>  /  Alb  2.2<L>  /  TBili  0.7  /  DBili  x   /  AST  36  /  ALT  41  /  AlkPhos  71  04-01    CAPILLARY BLOOD GLUCOSE      POCT Blood Glucose.: 99 mg/dL (01 Apr 2022 07:46)  POCT Blood Glucose.: 176 mg/dL (31 Mar 2022 21:49)  POCT Blood Glucose.: 305 mg/dL (31 Mar 2022 14:00)        LIVER FUNCTIONS - ( 01 Apr 2022 07:43 )  Alb: 2.2 g/dL / Pro: 5.8 g/dL / ALK PHOS: 71 U/L / ALT: 41 U/L DA / AST: 36 U/L / GGT: x           Creatinine Trend: 1.65<--, 1.83<--  I&O's Summary              MEDICATIONS:    MEDICATIONS  (STANDING):  aspirin enteric coated 81 milliGRAM(s) Oral daily  atorvastatin 40 milliGRAM(s) Oral at bedtime  enoxaparin Injectable 40 milliGRAM(s) SubCutaneous every 24 hours  furosemide   Injectable 40 milliGRAM(s) IV Push two times a day  insulin glargine Injectable (LANTUS) 30 Unit(s) SubCutaneous at bedtime  insulin lispro (ADMELOG) corrective regimen sliding scale   SubCutaneous three times a day before meals      MEDICATIONS  (PRN):      REVIEW OF SYSTEMS:                           ALL ROS DONE [ X   ]    CONSTITUTIONAL:  LETHARGIC [   ], FEVER [   ], UNRESPONSIVE [   ]  CVS:  CP  [   ], SOB, [   ], PALPITATIONS [   ], DIZZYNESS [   ]  RS: COUGH [   ], SPUTUM [   ]  GI: ABDOMINAL PAIN [   ], NAUSEA [   ], VOMITINGS [   ], DIARRHEA [   ], CONSTIPATION [   ]  :  DYSURIA [   ], NOCTURIA [   ], INCREASED FREQUENCY [   ], DRIBLING [   ],  SKELETAL: PAINFUL JOINTS [   ], SWOLLEN JOINTS [   ], NECK ACHE [   ], LOW BACK ACHE [   ],  SKIN : ULCERS [   ], RASH [   ], ITCHING [   ]  CNS: HEAD ACHE [   ], DOUBLE VISION [   ], BLURRED VISION [   ], AMS / CONFUSION [   ], SEIZURES [   ], WEAKNESS [   ],TINGLING / NUMBNESS [   ]    PHYSICAL EXAMINATION:  GENERAL APPEARANCE: NO DISTRESS  HEENT:  NO PALLOR, NO  JVD,  NO   NODES, NECK SUPPLE  CVS: S1 +, S2 +,   RS: AEEB,  OCCASIONAL  RALES +,   NO RONCHI  ABD: SOFT, NT, NO, BS +  EXT: NO PE  SKIN: WARM,   SKELETAL:  ROM ACCEPTABLE  CNS:  AAO X    ,   DEFICITS    RADIOLOGY :      ASSESSMENT :     Heart failure    Diabetes mellitus    Hypertension        PLAN:  HPI:  31 y/o Patient with PMH of HTN, HLD, Varicose veins presents to the ED for SOB and cough.  He states he was about to take off on a flight to [place] when he started having SOB and a cough that lasted several minutes, productive of clear sputum. He was taken off the flight and sent the ED. He has not been taking his medications for 2 months. He endorses chronic LE edema, Orthopnea and PND. Denies chest pain, fever, chills, abd pain, N/V/D, or any other complaints. Does not smoke, use ETOH or drugs. Supposed to take lisinopril 40mg, lantus 30 units, and lispro 15 units with meals. (31 Mar 2022 19:04)      # SUSPECT NEW ONSET CHF  # HYPERTENSIVE URGENCY, HTN  # B/L LE EDEMA - SUSPECT S/T CHF AND ?VARICOSE VEINS  - MONITOR ON TELEMETRY, F/U TROPONINS  - STARTED ON LASIX, F/U ECHOCARDIOGRAM  - F/U TSH/LIPID PANEL/HBA1C  - CARDIOLOGY CONSULT    # SUSANA - F/U UA, F/U BLADDER SCAN, MONITOR CR, AVOID NEPHROTOXIC AGENTS    # DM2, NONCOMPLIANT W/ INSULIN - F/U HBA1C, SSI + FS    # HLD - F/U LIPID PANEL    # GI AND DVT PPX      Patient is a 32y old  Male who presents with a chief complaint of SOB (01 Apr 2022 10:29)    PATIENT IS SEEN AND EXAMINED IN MEDICAL FLOOR.    ALLERGIES:  Keflex (Hives)      Daily Height in cm: 185.42 (31 Mar 2022 21:42)    Daily     VITALS:    Vital Signs Last 24 Hrs  T(C): 36.8 (01 Apr 2022 10:16), Max: 36.8 (31 Mar 2022 12:27)  T(F): 98.2 (01 Apr 2022 10:16), Max: 98.2 (31 Mar 2022 12:27)  HR: 95 (01 Apr 2022 10:16) (94 - 109)  BP: 178/111 (01 Apr 2022 10:16) (160/110 - 194/146)  BP(mean): --  RR: 16 (01 Apr 2022 10:16) (16 - 20)  SpO2: 98% (01 Apr 2022 10:16) (95% - 98%)    LABS:    CBC Full  -  ( 01 Apr 2022 07:43 )  WBC Count : 7.38 K/uL  RBC Count : 3.49 M/uL  Hemoglobin : 10.9 g/dL  Hematocrit : 31.4 %  Platelet Count - Automated : 272 K/uL  Mean Cell Volume : 90.0 fl  Mean Cell Hemoglobin : 31.2 pg  Mean Cell Hemoglobin Concentration : 34.7 gm/dL  Auto Neutrophil # : x  Auto Lymphocyte # : x  Auto Monocyte # : x  Auto Eosinophil # : x  Auto Basophil # : x  Auto Neutrophil % : x  Auto Lymphocyte % : x  Auto Monocyte % : x  Auto Eosinophil % : x  Auto Basophil % : x      04-01    139  |  106  |  27<H>  ----------------------------<  89  4.3   |  26  |  1.65<H>    Ca    8.6      01 Apr 2022 07:43  Phos  4.9     04-01  Mg     1.9     04-01    TPro  5.8<L>  /  Alb  2.2<L>  /  TBili  0.7  /  DBili  x   /  AST  36  /  ALT  41  /  AlkPhos  71  04-01    CAPILLARY BLOOD GLUCOSE      POCT Blood Glucose.: 99 mg/dL (01 Apr 2022 07:46)  POCT Blood Glucose.: 176 mg/dL (31 Mar 2022 21:49)  POCT Blood Glucose.: 305 mg/dL (31 Mar 2022 14:00)        LIVER FUNCTIONS - ( 01 Apr 2022 07:43 )  Alb: 2.2 g/dL / Pro: 5.8 g/dL / ALK PHOS: 71 U/L / ALT: 41 U/L DA / AST: 36 U/L / GGT: x           Creatinine Trend: 1.65<--, 1.83<--  I&O's Summary              MEDICATIONS:    MEDICATIONS  (STANDING):  aspirin enteric coated 81 milliGRAM(s) Oral daily  atorvastatin 40 milliGRAM(s) Oral at bedtime  enoxaparin Injectable 40 milliGRAM(s) SubCutaneous every 24 hours  furosemide   Injectable 40 milliGRAM(s) IV Push two times a day  insulin glargine Injectable (LANTUS) 30 Unit(s) SubCutaneous at bedtime  insulin lispro (ADMELOG) corrective regimen sliding scale   SubCutaneous three times a day before meals      MEDICATIONS  (PRN):      REVIEW OF SYSTEMS:                           ALL ROS DONE [ X   ]    CONSTITUTIONAL:  LETHARGIC [   ], FEVER [   ], UNRESPONSIVE [   ]  CVS:  CP  [   ], SOB, [   ], PALPITATIONS [   ], DIZZYNESS [   ]  RS: COUGH [   ], SPUTUM [   ]  GI: ABDOMINAL PAIN [   ], NAUSEA [   ], VOMITINGS [   ], DIARRHEA [   ], CONSTIPATION [   ]  :  DYSURIA [   ], NOCTURIA [   ], INCREASED FREQUENCY [   ], DRIBLING [   ],  SKELETAL: PAINFUL JOINTS [   ], SWOLLEN JOINTS [   ], NECK ACHE [   ], LOW BACK ACHE [   ],  SKIN : ULCERS [   ], RASH [   ], ITCHING [   ]  CNS: HEAD ACHE [   ], DOUBLE VISION [   ], BLURRED VISION [   ], AMS / CONFUSION [   ], SEIZURES [   ], WEAKNESS [   ],TINGLING / NUMBNESS [   ]    PHYSICAL EXAMINATION:  GENERAL APPEARANCE: NO DISTRESS  HEENT:  NO PALLOR, NO  JVD,  NO   NODES, NECK SUPPLE  CVS: S1 +, S2 +,   RS: AEEB,  OCCASIONAL  RALES +,   NO RONCHI  ABD: SOFT, NT, NO, BS +  EXT: PE ++ [B/L LE]  SKIN: WARM,   SKELETAL:  ROM ACCEPTABLE  CNS:  AAO X 3     RADIOLOGY :      ACC: [id number] EXAM:  US DPLX LWR EXT VEINS COMPL BI                          PROCEDURE DATE:  04/01/2022          INTERPRETATION:  CLINICAL INFORMATION: Shortness of breath. Diabetes.    COMPARISON: None available.    TECHNIQUE: Duplex sonography of the BILATERAL LOWER extremity veins with   color and spectral Doppler, with and without compression. The examination   is technically limited secondary to subcutaneous edema in the calves and   body habitus.    FINDINGS:    RIGHT:  Normal compressibility of the RIGHT common femoral, femoral and popliteal   veins.  Doppler examination shows normal spontaneous and phasic flow.  Calf veins not seen.    Subcutaneous edema in the calf.    LEFT:  Normal compressibility of the LEFT common femoral, femoral and popliteal   veins.  Doppler examination shows normal spontaneous and phasic flow.  Calf veins not seen.    Subcutaneous edema in the calf.    IMPRESSION:    Technically limited study including nonvisualization of the calf veins   with no evidence of deep venous thrombosis in the visualized bilateral   lower extremity veins.    ============================      ACC: [id number] EXAM:  NM PULM VENTILATION PERFUS IMG                          PROCEDURE DATE:  04/01/2022          INTERPRETATION:  CLINICAL INFORMATION: 32 year old male with dyspnea,   elevated serum creatinine level; referred to evaluate for pulmonary   embolism.    RADIOPHARMACEUTICAL: 1.0 mCi Tc-99m-DTPA via inhalation; 6.2 mCi   Tc-99m-MAA, I.V.    TECHNIQUE:  Ventilation and perfusion images of the lungs were obtained   following administration of Tc-99m-DTPA and Tc-99m-MAA. Images were   obtained in the anterior, posterior, both lateral, and all 4 oblique   projections. The study was interpreted in conjunction with a chest   radiograph of 3/31/2022.    COMPARISON: No prior lung V/Q scan.    FINDINGS: There is heterogeneous distribution of radiopharmaceutical in   the lungs on the ventilation and perfusion images. There are no segmental   perfusion defects.    IMPRESSION: Very low probability of pulmonary embolus.    ===============================    ACC: [id number] EXAM:  XR CHEST PORTABLE URGENT 1V                          PROCEDURE DATE:  03/31/2022          INTERPRETATION:  AP erect chest on March 31, 2022 at 1:11 PM. Patient has   cough and short of breath.    COMPARISON: None available.    Heart magnified by technique.    No definite infiltrate.    IMPRESSION: No definite infiltrate.    ASSESSMENT :     Heart failure    Diabetes mellitus    Hypertension        PLAN:  HPI:  33 y/o Patient with PMH of HTN, HLD, Varicose veins presents to the ED for SOB and cough.  He states he was about to take off on a flight to [place] when he started having SOB and a cough that lasted several minutes, productive of clear sputum. He was taken off the flight and sent the ED. He has not been taking his medications for 2 months. He endorses chronic LE edema, Orthopnea and PND. Denies chest pain, fever, chills, abd pain, N/V/D, or any other complaints. Does not smoke, use ETOH or drugs. Supposed to take lisinopril 40mg, lantus 30 units, and lispro 15 units with meals. (31 Mar 2022 19:04)      # SUSPECT NEW ONSET CHF - SYSTOLIC AND DIASTOLIC DYSFUNCTION  # HYPERTENSIVE URGENCY, HTN  # B/L LE EDEMA - SUSPECT S/T CHF AND ?VARICOSE VEINS  - MONITOR ON TELEMETRY, TRENDED TROPONINS  - STARTED ON LASIX, STATIN  - REVIEWED TSH/LIPID PANEL/HBA1C  - CARDIOLOGY CONSULT    - ECHO - MILD MR, DILATED LA, CONCENTRIC LVH, GLOBAL LV SYSTOLIC DYSFUNCTION, G1DD  - F/U NST    # ELEVATED D-DIMER  - V/Q SCAN - LOW PROBABILITY PE  - DOPPLER LE - NEGATIVE FOR DVT      # SUSANA - F/U UA, F/U BLADDER SCAN, MONITOR CR, AVOID NEPHROTOXIC AGENTS    # SUSPECT CAMERON  - OUTPATIENT SLEEP STUDY    # MORBID OBESITY   - NUTRITION CONSULT    # DM2, NONCOMPLIANT W/ INSULIN - HBA1C 9.4 - LANTUS, SSI + FS    # HLD - STARTED STATIN, REVIEWED LIPID PANEL    # GI AND DVT PPX

## 2022-04-02 LAB
ANION GAP SERPL CALC-SCNC: 7 MMOL/L — SIGNIFICANT CHANGE UP (ref 5–17)
APPEARANCE UR: CLEAR — SIGNIFICANT CHANGE UP
BACTERIA # UR AUTO: ABNORMAL /HPF
BILIRUB UR-MCNC: NEGATIVE — SIGNIFICANT CHANGE UP
BUN SERPL-MCNC: 31 MG/DL — HIGH (ref 7–18)
CALCIUM SERPL-MCNC: 8.3 MG/DL — LOW (ref 8.4–10.5)
CHLORIDE SERPL-SCNC: 106 MMOL/L — SIGNIFICANT CHANGE UP (ref 96–108)
CO2 SERPL-SCNC: 25 MMOL/L — SIGNIFICANT CHANGE UP (ref 22–31)
COLOR SPEC: YELLOW — SIGNIFICANT CHANGE UP
CREAT ?TM UR-MCNC: 62 MG/DL — SIGNIFICANT CHANGE UP
CREAT SERPL-MCNC: 1.63 MG/DL — HIGH (ref 0.5–1.3)
DIFF PNL FLD: ABNORMAL
EGFR: 57 ML/MIN/1.73M2 — LOW
EPI CELLS # UR: ABNORMAL /HPF
GLUCOSE BLDC GLUCOMTR-MCNC: 121 MG/DL — HIGH (ref 70–99)
GLUCOSE BLDC GLUCOMTR-MCNC: 125 MG/DL — HIGH (ref 70–99)
GLUCOSE BLDC GLUCOMTR-MCNC: 132 MG/DL — HIGH (ref 70–99)
GLUCOSE BLDC GLUCOMTR-MCNC: 132 MG/DL — HIGH (ref 70–99)
GLUCOSE BLDC GLUCOMTR-MCNC: 170 MG/DL — HIGH (ref 70–99)
GLUCOSE BLDC GLUCOMTR-MCNC: 184 MG/DL — HIGH (ref 70–99)
GLUCOSE SERPL-MCNC: 183 MG/DL — HIGH (ref 70–99)
GLUCOSE UR QL: NEGATIVE — SIGNIFICANT CHANGE UP
HCT VFR BLD CALC: 29.9 % — LOW (ref 39–50)
HGB BLD-MCNC: 10.5 G/DL — LOW (ref 13–17)
KETONES UR-MCNC: NEGATIVE — SIGNIFICANT CHANGE UP
LEUKOCYTE ESTERASE UR-ACNC: NEGATIVE — SIGNIFICANT CHANGE UP
MCHC RBC-ENTMCNC: 31.3 PG — SIGNIFICANT CHANGE UP (ref 27–34)
MCHC RBC-ENTMCNC: 35.1 GM/DL — SIGNIFICANT CHANGE UP (ref 32–36)
MCV RBC AUTO: 89.3 FL — SIGNIFICANT CHANGE UP (ref 80–100)
NITRITE UR-MCNC: NEGATIVE — SIGNIFICANT CHANGE UP
NRBC # BLD: 0 /100 WBCS — SIGNIFICANT CHANGE UP (ref 0–0)
PH UR: 6 — SIGNIFICANT CHANGE UP (ref 5–8)
PLATELET # BLD AUTO: 257 K/UL — SIGNIFICANT CHANGE UP (ref 150–400)
POTASSIUM SERPL-MCNC: 4.3 MMOL/L — SIGNIFICANT CHANGE UP (ref 3.5–5.3)
POTASSIUM SERPL-SCNC: 4.3 MMOL/L — SIGNIFICANT CHANGE UP (ref 3.5–5.3)
PROT UR-MCNC: 100
RBC # BLD: 3.35 M/UL — LOW (ref 4.2–5.8)
RBC # FLD: 11.5 % — SIGNIFICANT CHANGE UP (ref 10.3–14.5)
RBC CASTS # UR COMP ASSIST: ABNORMAL /HPF (ref 0–2)
SODIUM SERPL-SCNC: 138 MMOL/L — SIGNIFICANT CHANGE UP (ref 135–145)
SODIUM UR-SCNC: 95 MMOL/L — SIGNIFICANT CHANGE UP
SP GR SPEC: 1.01 — SIGNIFICANT CHANGE UP (ref 1.01–1.02)
TROPONIN I, HIGH SENSITIVITY RESULT: 63.7 NG/L — SIGNIFICANT CHANGE UP
UROBILINOGEN FLD QL: NEGATIVE — SIGNIFICANT CHANGE UP
WBC # BLD: 6.58 K/UL — SIGNIFICANT CHANGE UP (ref 3.8–10.5)
WBC # FLD AUTO: 6.58 K/UL — SIGNIFICANT CHANGE UP (ref 3.8–10.5)
WBC UR QL: SIGNIFICANT CHANGE UP /HPF (ref 0–5)

## 2022-04-02 RX ADMIN — Medication 1: at 12:08

## 2022-04-02 RX ADMIN — Medication 40 MILLIGRAM(S): at 05:26

## 2022-04-02 RX ADMIN — Medication 40 MILLIGRAM(S): at 17:24

## 2022-04-02 RX ADMIN — ENOXAPARIN SODIUM 40 MILLIGRAM(S): 100 INJECTION SUBCUTANEOUS at 21:37

## 2022-04-02 RX ADMIN — ATORVASTATIN CALCIUM 40 MILLIGRAM(S): 80 TABLET, FILM COATED ORAL at 21:37

## 2022-04-02 RX ADMIN — INSULIN GLARGINE 15 UNIT(S): 100 INJECTION, SOLUTION SUBCUTANEOUS at 21:38

## 2022-04-02 RX ADMIN — Medication 81 MILLIGRAM(S): at 12:09

## 2022-04-02 NOTE — PROGRESS NOTE ADULT - SUBJECTIVE AND OBJECTIVE BOX
pt seen and examined, no complaints, ROS - .     aspirin enteric coated 81 milliGRAM(s) Oral daily  atorvastatin 40 milliGRAM(s) Oral at bedtime  enoxaparin Injectable 40 milliGRAM(s) SubCutaneous every 24 hours  furosemide   Injectable 40 milliGRAM(s) IV Push two times a day  insulin glargine Injectable (LANTUS) 15 Unit(s) SubCutaneous at bedtime  insulin lispro (ADMELOG) corrective regimen sliding scale   SubCutaneous three times a day before meals                            10.9   7.38  )-----------( 272      ( 01 Apr 2022 07:43 )             31.4       Hemoglobin: 10.9 g/dL (04-01 @ 07:43)  Hemoglobin: 11.7 g/dL (03-31 @ 13:37)      04-01    139  |  106  |  27<H>  ----------------------------<  89  4.3   |  26  |  1.65<H>    Ca    8.6      01 Apr 2022 07:43  Phos  4.9     04-01  Mg     1.9     04-01    TPro  5.8<L>  /  Alb  2.2<L>  /  TBili  0.7  /  DBili  x   /  AST  36  /  ALT  41  /  AlkPhos  71  04-01    Creatinine Trend: 1.65<--, 1.83<--    COAGS:           T(C): 36.8 (04-02-22 @ 09:31), Max: 37 (04-02-22 @ 05:05)  HR: 100 (04-02-22 @ 09:31) (89 - 100)  BP: 149/95 (04-02-22 @ 09:31) (149/80 - 178/111)  RR: 18 (04-02-22 @ 09:31) (16 - 18)  SpO2: 100% (04-02-22 @ 09:31) (95% - 100%)  Wt(kg): --    I&O's Summary    Gen: Appears well in NAD  HEENT:  (-)icterus (-)pallor  CV: N S1 S2 1/6 CAMRON (+)2 Pulses B/l  Resp:  Clear to ausculatation B/L, normal effort  GI: (+) BS Soft, NT, ND  Lymph:  (+)Edema, (-)obvious lymphadenopathy  Skin: Warm to touch, Normal turgor  Psych: Appropriate mood and affect    ECG:  	Sinus Tach 105 BPM    RADIOLOGY:         CXR:  no infiltrate     ECHO: < from: Transthoracic Echocardiogram (04.01.22 @ 07:17) >  CONCLUSIONS:  1. Normal mitral valve. Mild mitral regurgitation.  2. Normal trileaflet aortic valve.  3. Aortic Root: 3.7 cm.  4. Severely dilated left atrium.  LA volume index = 66  cc/m2.  5. Moderate concentric left ventricular hypertrophy.  6. Moderate global left ventricular systolic dysfunction.  7. Grade I diastolic dysfunction (Impaired relaxation,  mild).  8. Normal right atrium.  9. Normal right ventricular size and systolic function  (TAPSE  1.9cm).  10. Unable to estimate RVSP.  11. There is trace tricuspid regurgitation.  12. There is trace pulmonic regurgitation.  13. Small pericardial effusion.    ------------------------------------------------------------------------  Confirmed on  4/1/2022 - 15:48:23 by Julieta Guerra MD  ------------------------------------------------------------------------    < end of copied text >      ASSESSMENT/PLAN: 	32y Male  PMH of HTN, HLD, Varicose veins presents to the ED for SOB and cough positive trop elevated BNP.    - cont ASA, statin   - Keep net neg w/ IV lasix   - Elevated Ddimmer, VQ scan (-) f/u lower extremity dopplers  - Echo noted  - IV lasix to keep net negative  - Dailyl weights.   - D/W Dr Singh

## 2022-04-02 NOTE — DISCHARGE NOTE PROVIDER - NSDCMRMEDTOKEN_GEN_ALL_CORE_FT
insulin lispro 100 units/mL injectable solution: 15 unit(s) injectable 3 times a day  Lantus 100 units/mL subcutaneous solution: 40 unit(s) subcutaneous once a day (at bedtime)  lisinopril 40 mg oral tablet: 1 tab(s) orally once a day

## 2022-04-02 NOTE — PROGRESS NOTE ADULT - SUBJECTIVE AND OBJECTIVE BOX
`Patient is a 32y old  Male who presents with a chief complaint of SOB (02 Apr 2022 09:35)    PATIENT IS SEEN AND EXAMINED IN MEDICAL FLOOR.  NGT [    ]    SMALL [   ]      GT [   ]    ALLERGIES:  Keflex (Hives)      Daily     Daily     VITALS:    Vital Signs Last 24 Hrs  T(C): 36.8 (02 Apr 2022 09:31), Max: 37 (02 Apr 2022 05:05)  T(F): 98.3 (02 Apr 2022 09:31), Max: 98.6 (02 Apr 2022 05:05)  HR: 100 (02 Apr 2022 09:31) (89 - 100)  BP: 149/95 (02 Apr 2022 09:31) (149/80 - 169/112)  BP(mean): --  RR: 18 (02 Apr 2022 09:31) (16 - 18)  SpO2: 100% (02 Apr 2022 09:31) (95% - 100%)    LABS:    CBC Full  -  ( 02 Apr 2022 10:40 )  WBC Count : 6.58 K/uL  RBC Count : 3.35 M/uL  Hemoglobin : 10.5 g/dL  Hematocrit : 29.9 %  Platelet Count - Automated : 257 K/uL  Mean Cell Volume : 89.3 fl  Mean Cell Hemoglobin : 31.3 pg  Mean Cell Hemoglobin Concentration : 35.1 gm/dL  Auto Neutrophil # : x  Auto Lymphocyte # : x  Auto Monocyte # : x  Auto Eosinophil # : x  Auto Basophil # : x  Auto Neutrophil % : x  Auto Lymphocyte % : x  Auto Monocyte % : x  Auto Eosinophil % : x  Auto Basophil % : x      04-01    139  |  106  |  27<H>  ----------------------------<  89  4.3   |  26  |  1.65<H>    Ca    8.6      01 Apr 2022 07:43  Phos  4.9     04-01  Mg     1.9     04-01    TPro  5.8<L>  /  Alb  2.2<L>  /  TBili  0.7  /  DBili  x   /  AST  36  /  ALT  41  /  AlkPhos  71  04-01    CAPILLARY BLOOD GLUCOSE      POCT Blood Glucose.: 121 mg/dL (02 Apr 2022 07:39)  POCT Blood Glucose.: 132 mg/dL (02 Apr 2022 05:26)  POCT Blood Glucose.: 184 mg/dL (02 Apr 2022 01:46)  POCT Blood Glucose.: 189 mg/dL (01 Apr 2022 21:34)  POCT Blood Glucose.: 102 mg/dL (01 Apr 2022 16:50)  POCT Blood Glucose.: 164 mg/dL (01 Apr 2022 11:08)        LIVER FUNCTIONS - ( 01 Apr 2022 07:43 )  Alb: 2.2 g/dL / Pro: 5.8 g/dL / ALK PHOS: 71 U/L / ALT: 41 U/L DA / AST: 36 U/L / GGT: x           Creatinine Trend: 1.65<--, 1.83<--  I&O's Summary              MEDICATIONS:    MEDICATIONS  (STANDING):  aspirin enteric coated 81 milliGRAM(s) Oral daily  atorvastatin 40 milliGRAM(s) Oral at bedtime  enoxaparin Injectable 40 milliGRAM(s) SubCutaneous every 24 hours  furosemide   Injectable 40 milliGRAM(s) IV Push two times a day  insulin glargine Injectable (LANTUS) 15 Unit(s) SubCutaneous at bedtime  insulin lispro (ADMELOG) corrective regimen sliding scale   SubCutaneous three times a day before meals      MEDICATIONS  (PRN):      REVIEW OF SYSTEMS:                           ALL ROS DONE [ X   ]    CONSTITUTIONAL:  LETHARGIC [   ], FEVER [   ], UNRESPONSIVE [   ]  CVS:  CP  [   ], SOB, [   ], PALPITATIONS [   ], DIZZYNESS [   ]  RS: COUGH [   ], SPUTUM [   ]  GI: ABDOMINAL PAIN [   ], NAUSEA [   ], VOMITINGS [   ], DIARRHEA [   ], CONSTIPATION [   ]  :  DYSURIA [   ], NOCTURIA [   ], INCREASED FREQUENCY [   ], DRIBLING [   ],  SKELETAL: PAINFUL JOINTS [   ], SWOLLEN JOINTS [   ], NECK ACHE [   ], LOW BACK ACHE [   ],  SKIN : ULCERS [   ], RASH [   ], ITCHING [   ]  CNS: HEAD ACHE [   ], DOUBLE VISION [   ], BLURRED VISION [   ], AMS / CONFUSION [   ], SEIZURES [   ], WEAKNESS [   ],TINGLING / NUMBNESS [   ]      PHYSICAL EXAMINATION:  GENERAL APPEARANCE: NO DISTRESS  HEENT:  NO PALLOR, NO  JVD,  NO   NODES, NECK SUPPLE  CVS: S1 +, S2 +,   RS: AEEB,  OCCASIONAL  RALES +,   NO RONCHI  ABD: SOFT, NT, NO, BS +  EXT: PE ++ [B/L LE]  SKIN: WARM,   SKELETAL:  ROM ACCEPTABLE  CNS:  AAO X 3     RADIOLOGY :      ACC: [id number] EXAM:  US DPLX LWR EXT VEINS COMPL BI                          PROCEDURE DATE:  04/01/2022          INTERPRETATION:  CLINICAL INFORMATION: Shortness of breath. Diabetes.    COMPARISON: None available.    TECHNIQUE: Duplex sonography of the BILATERAL LOWER extremity veins with   color and spectral Doppler, with and without compression. The examination   is technically limited secondary to subcutaneous edema in the calves and   body habitus.    FINDINGS:    RIGHT:  Normal compressibility of the RIGHT common femoral, femoral and popliteal   veins.  Doppler examination shows normal spontaneous and phasic flow.  Calf veins not seen.    Subcutaneous edema in the calf.    LEFT:  Normal compressibility of the LEFT common femoral, femoral and popliteal   veins.  Doppler examination shows normal spontaneous and phasic flow.  Calf veins not seen.    Subcutaneous edema in the calf.    IMPRESSION:    Technically limited study including nonvisualization of the calf veins   with no evidence of deep venous thrombosis in the visualized bilateral   lower extremity veins.    ============================      ACC: [id number] EXAM:  NM PULM VENTILATION PERFUS IMG                          PROCEDURE DATE:  04/01/2022          INTERPRETATION:  CLINICAL INFORMATION: 32 year old male with dyspnea,   elevated serum creatinine level; referred to evaluate for pulmonary   embolism.    RADIOPHARMACEUTICAL: 1.0 mCi Tc-99m-DTPA via inhalation; 6.2 mCi   Tc-99m-MAA, I.V.    TECHNIQUE:  Ventilation and perfusion images of the lungs were obtained   following administration of Tc-99m-DTPA and Tc-99m-MAA. Images were   obtained in the anterior, posterior, both lateral, and all 4 oblique   projections. The study was interpreted in conjunction with a chest   radiograph of 3/31/2022.    COMPARISON: No prior lung V/Q scan.    FINDINGS: There is heterogeneous distribution of radiopharmaceutical in   the lungs on the ventilation and perfusion images. There are no segmental   perfusion defects.    IMPRESSION: Very low probability of pulmonary embolus.    ===============================    ACC: [id number] EXAM:  XR CHEST PORTABLE URGENT 1V                          PROCEDURE DATE:  03/31/2022          INTERPRETATION:  AP erect chest on March 31, 2022 at 1:11 PM. Patient has   cough and short of breath.    COMPARISON: None available.    Heart magnified by technique.    No definite infiltrate.    IMPRESSION: No definite infiltrate.    ASSESSMENT :     Heart failure    Diabetes mellitus    Hypertension        PLAN:  HPI:  33 y/o Patient with PMH of HTN, HLD, Varicose veins presents to the ED for SOB and cough.  He states he was about to take off on a flight to [place] when he started having SOB and a cough that lasted several minutes, productive of clear sputum. He was taken off the flight and sent the ED. He has not been taking his medications for 2 months. He endorses chronic LE edema, Orthopnea and PND. Denies chest pain, fever, chills, abd pain, N/V/D, or any other complaints. Does not smoke, use ETOH or drugs. Supposed to take lisinopril 40mg, lantus 30 units, and lispro 15 units with meals. (31 Mar 2022 19:04)      # SUSPECT NEW ONSET CHF - SYSTOLIC AND DIASTOLIC DYSFUNCTION  # HYPERTENSIVE URGENCY, HTN  # B/L LE EDEMA - SUSPECT S/T CHF AND ?VARICOSE VEINS  - MONITOR ON TELEMETRY, TRENDED TROPONINS  - STARTED ON LASIX, STATIN  - REVIEWED TSH/LIPID PANEL/HBA1C  - CARDIOLOGY CONSULT    - ECHO - MILD MR, DILATED LA, CONCENTRIC LVH, GLOBAL LV SYSTOLIC DYSFUNCTION, G1DD  - F/U NST    # ELEVATED D-DIMER  - V/Q SCAN - LOW PROBABILITY PE  - DOPPLER LE - NEGATIVE FOR DVT      # SUSANA - F/U UA, F/U BLADDER SCAN, MONITOR CR, AVOID NEPHROTOXIC AGENTS    # SUSPECT CAMERON  - OUTPATIENT SLEEP STUDY    # MORBID OBESITY   - NUTRITION CONSULT    # DM2, NONCOMPLIANT W/ INSULIN - HBA1C 9.4 - LANTUS, SSI + FS    # HLD - STARTED STATIN, REVIEWED LIPID PANEL    # GI AND DVT PPX      Patient is a 32y old  Male who presents with a chief complaint of SOB (02 Apr 2022 09:35)    PATIENT IS SEEN AND EXAMINED IN MEDICAL FLOOR.    ALLERGIES:  Keflex (Hives)      VITALS:    Vital Signs Last 24 Hrs  T(C): 36.8 (02 Apr 2022 09:31), Max: 37 (02 Apr 2022 05:05)  T(F): 98.3 (02 Apr 2022 09:31), Max: 98.6 (02 Apr 2022 05:05)  HR: 100 (02 Apr 2022 09:31) (89 - 100)  BP: 149/95 (02 Apr 2022 09:31) (149/80 - 169/112)  BP(mean): --  RR: 18 (02 Apr 2022 09:31) (16 - 18)  SpO2: 100% (02 Apr 2022 09:31) (95% - 100%)    LABS:    CBC Full  -  ( 02 Apr 2022 10:40 )  WBC Count : 6.58 K/uL  RBC Count : 3.35 M/uL  Hemoglobin : 10.5 g/dL  Hematocrit : 29.9 %  Platelet Count - Automated : 257 K/uL  Mean Cell Volume : 89.3 fl  Mean Cell Hemoglobin : 31.3 pg  Mean Cell Hemoglobin Concentration : 35.1 gm/dL  Auto Neutrophil # : x  Auto Lymphocyte # : x  Auto Monocyte # : x  Auto Eosinophil # : x  Auto Basophil # : x  Auto Neutrophil % : x  Auto Lymphocyte % : x  Auto Monocyte % : x  Auto Eosinophil % : x  Auto Basophil % : x      04-01    139  |  106  |  27<H>  ----------------------------<  89  4.3   |  26  |  1.65<H>    Ca    8.6      01 Apr 2022 07:43  Phos  4.9     04-01  Mg     1.9     04-01    TPro  5.8<L>  /  Alb  2.2<L>  /  TBili  0.7  /  DBili  x   /  AST  36  /  ALT  41  /  AlkPhos  71  04-01    CAPILLARY BLOOD GLUCOSE      POCT Blood Glucose.: 121 mg/dL (02 Apr 2022 07:39)  POCT Blood Glucose.: 132 mg/dL (02 Apr 2022 05:26)  POCT Blood Glucose.: 184 mg/dL (02 Apr 2022 01:46)  POCT Blood Glucose.: 189 mg/dL (01 Apr 2022 21:34)  POCT Blood Glucose.: 102 mg/dL (01 Apr 2022 16:50)  POCT Blood Glucose.: 164 mg/dL (01 Apr 2022 11:08)        LIVER FUNCTIONS - ( 01 Apr 2022 07:43 )  Alb: 2.2 g/dL / Pro: 5.8 g/dL / ALK PHOS: 71 U/L / ALT: 41 U/L DA / AST: 36 U/L / GGT: x           Creatinine Trend: 1.65<--, 1.83<--  I&O's Summary      MEDICATIONS:    MEDICATIONS  (STANDING):  aspirin enteric coated 81 milliGRAM(s) Oral daily  atorvastatin 40 milliGRAM(s) Oral at bedtime  enoxaparin Injectable 40 milliGRAM(s) SubCutaneous every 24 hours  furosemide   Injectable 40 milliGRAM(s) IV Push two times a day  insulin glargine Injectable (LANTUS) 15 Unit(s) SubCutaneous at bedtime  insulin lispro (ADMELOG) corrective regimen sliding scale   SubCutaneous three times a day before meals      MEDICATIONS  (PRN):      REVIEW OF SYSTEMS:                           ALL ROS DONE [ X   ]    CONSTITUTIONAL:  LETHARGIC [   ], FEVER [   ], UNRESPONSIVE [   ]  CVS:  CP  [   ], SOB, [   ], PALPITATIONS [   ], DIZZYNESS [   ]  RS: COUGH [   ], SPUTUM [   ]  GI: ABDOMINAL PAIN [   ], NAUSEA [   ], VOMITINGS [   ], DIARRHEA [   ], CONSTIPATION [   ]  :  DYSURIA [   ], NOCTURIA [   ], INCREASED FREQUENCY [   ], DRIBLING [   ],  SKELETAL: PAINFUL JOINTS [   ], SWOLLEN JOINTS [   ], NECK ACHE [   ], LOW BACK ACHE [   ],  SKIN : ULCERS [   ], RASH [   ], ITCHING [   ]  CNS: HEAD ACHE [   ], DOUBLE VISION [   ], BLURRED VISION [   ], AMS / CONFUSION [   ], SEIZURES [   ], WEAKNESS [   ],TINGLING / NUMBNESS [   ]      PHYSICAL EXAMINATION:  GENERAL APPEARANCE: NO DISTRESS  HEENT:  NO PALLOR, NO  JVD,  NO   NODES, NECK SUPPLE  CVS: S1 +, S2 +,   RS: AEEB,  OCCASIONAL  RALES +,   NO RONCHI  ABD: SOFT, NT, NO, BS +  EXT: PE ++ [B/L LE]  SKIN: WARM,   SKELETAL:  ROM ACCEPTABLE  CNS:  AAO X 3     RADIOLOGY :      ACC: [id number] EXAM:  US DPLX LWR EXT VEINS COMPL BI                          PROCEDURE DATE:  04/01/2022          INTERPRETATION:  CLINICAL INFORMATION: Shortness of breath. Diabetes.    COMPARISON: None available.    TECHNIQUE: Duplex sonography of the BILATERAL LOWER extremity veins with   color and spectral Doppler, with and without compression. The examination   is technically limited secondary to subcutaneous edema in the calves and   body habitus.    FINDINGS:    RIGHT:  Normal compressibility of the RIGHT common femoral, femoral and popliteal   veins.  Doppler examination shows normal spontaneous and phasic flow.  Calf veins not seen.    Subcutaneous edema in the calf.    LEFT:  Normal compressibility of the LEFT common femoral, femoral and popliteal   veins.  Doppler examination shows normal spontaneous and phasic flow.  Calf veins not seen.    Subcutaneous edema in the calf.    IMPRESSION:    Technically limited study including nonvisualization of the calf veins   with no evidence of deep venous thrombosis in the visualized bilateral   lower extremity veins.    ============================      ACC: [id number] EXAM:  NM PULM VENTILATION PERFUS IMG                          PROCEDURE DATE:  04/01/2022          INTERPRETATION:  CLINICAL INFORMATION: 32 year old male with dyspnea,   elevated serum creatinine level; referred to evaluate for pulmonary   embolism.    RADIOPHARMACEUTICAL: 1.0 mCi Tc-99m-DTPA via inhalation; 6.2 mCi   Tc-99m-MAA, I.V.    TECHNIQUE:  Ventilation and perfusion images of the lungs were obtained   following administration of Tc-99m-DTPA and Tc-99m-MAA. Images were   obtained in the anterior, posterior, both lateral, and all 4 oblique   projections. The study was interpreted in conjunction with a chest   radiograph of 3/31/2022.    COMPARISON: No prior lung V/Q scan.    FINDINGS: There is heterogeneous distribution of radiopharmaceutical in   the lungs on the ventilation and perfusion images. There are no segmental   perfusion defects.    IMPRESSION: Very low probability of pulmonary embolus.    ===============================    ACC: [id number] EXAM:  XR CHEST PORTABLE URGENT 1V                          PROCEDURE DATE:  03/31/2022          INTERPRETATION:  AP erect chest on March 31, 2022 at 1:11 PM. Patient has   cough and short of breath.    COMPARISON: None available.    Heart magnified by technique.    No definite infiltrate.    IMPRESSION: No definite infiltrate.    ASSESSMENT :     Heart failure    Diabetes mellitus    Hypertension        PLAN:  HPI:  33 y/o Patient with PMH of HTN, HLD, Varicose veins presents to the ED for SOB and cough.  He states he was about to take off on a flight to [place] when he started having SOB and a cough that lasted several minutes, productive of clear sputum. He was taken off the flight and sent the ED. He has not been taking his medications for 2 months. He endorses chronic LE edema, Orthopnea and PND. Denies chest pain, fever, chills, abd pain, N/V/D, or any other complaints. Does not smoke, use ETOH or drugs. Supposed to take lisinopril 40mg, lantus 30 units, and lispro 15 units with meals. (31 Mar 2022 19:04)      # SUSPECT NEW ONSET CHF - SYSTOLIC AND DIASTOLIC DYSFUNCTION  # HYPERTENSIVE URGENCY, HTN  # B/L LE EDEMA - SUSPECT S/T CHF AND ?VARICOSE VEINS  - MONITOR ON TELEMETRY, TRENDED TROPONINS  - STARTED ON LASIX, STATIN  - REVIEWED TSH/LIPID PANEL/HBA1C  - CARDIOLOGY CONSULT    - ECHO - MILD MR, DILATED LA, CONCENTRIC LVH, GLOBAL LV SYSTOLIC DYSFUNCTION, G1DD  - PLANNED FOR TRANSFER TO [facility] FOR ? CARDIAC CATH    # ELEVATED D-DIMER  - V/Q SCAN - LOW PROBABILITY PE  - DOPPLER LE - NEGATIVE FOR DVT      # SUSANA VS. CKD - F/U UA, F/U BLADDER SCAN, MONITOR CR, AVOID NEPHROTOXIC AGENTS    # SUSPECT CAMERON  - OUTPATIENT SLEEP STUDY    # MORBID OBESITY   - NUTRITION CONSULT    # DM2, NONCOMPLIANT W/ INSULIN - HBA1C 9.4 - LANTUS, SSI + FS    # HLD - STARTED STATIN, REVIEWED LIPID PANEL    # GI AND DVT PPX

## 2022-04-02 NOTE — DISCHARGE NOTE PROVIDER - HOSPITAL COURSE
31 y/o Patient with PMH of HTN, HLD, Varicose veins presents to the ED for SOB and cough.  He states he was about to take off on a flight to [place] when he started having SOB and a cough that lasted several minutes, productive of clear sputum. He was taken off the flight and sent the ED. He has not been taking his medications for 2 months. He endorses chronic LE edema, Orthopnea and PND. Denies chest pain, fever, chills, abd pain, N/V/D, or any other complaints. Does not smoke, use ETOH or drugs. Supposed to take lisinopril 40mg, lantus 30 units, and lispro 15 units with meals. On admission, Cr was 1.83   BNP was 2900,  he was started on IV lasix  Ddimer 335, LE dopplers neg, V/Q scan with very low probability of PE.  Trop 83.6 => 88.6 => 119.2 =>63.7 => TTE showed mod global LV systolic dysfunction, EF 40-45% with mod concentric LV hypertrophy, sev dil LA, no sig valvular disease.   He was recommended by Cards to have cardiac cath to be done at [facility]      ===INCOMPLETE===

## 2022-04-03 LAB
ANION GAP SERPL CALC-SCNC: 7 MMOL/L — SIGNIFICANT CHANGE UP (ref 5–17)
BUN SERPL-MCNC: 30 MG/DL — HIGH (ref 7–18)
CALCIUM SERPL-MCNC: 8.4 MG/DL — SIGNIFICANT CHANGE UP (ref 8.4–10.5)
CHLORIDE SERPL-SCNC: 106 MMOL/L — SIGNIFICANT CHANGE UP (ref 96–108)
CO2 SERPL-SCNC: 27 MMOL/L — SIGNIFICANT CHANGE UP (ref 22–31)
CREAT SERPL-MCNC: 1.64 MG/DL — HIGH (ref 0.5–1.3)
EGFR: 57 ML/MIN/1.73M2 — LOW
GLUCOSE BLDC GLUCOMTR-MCNC: 103 MG/DL — HIGH (ref 70–99)
GLUCOSE BLDC GLUCOMTR-MCNC: 151 MG/DL — HIGH (ref 70–99)
GLUCOSE BLDC GLUCOMTR-MCNC: 157 MG/DL — HIGH (ref 70–99)
GLUCOSE BLDC GLUCOMTR-MCNC: 235 MG/DL — HIGH (ref 70–99)
GLUCOSE SERPL-MCNC: 96 MG/DL — SIGNIFICANT CHANGE UP (ref 70–99)
HCT VFR BLD CALC: 29.9 % — LOW (ref 39–50)
HGB BLD-MCNC: 10.2 G/DL — LOW (ref 13–17)
MCHC RBC-ENTMCNC: 30.9 PG — SIGNIFICANT CHANGE UP (ref 27–34)
MCHC RBC-ENTMCNC: 34.1 GM/DL — SIGNIFICANT CHANGE UP (ref 32–36)
MCV RBC AUTO: 90.6 FL — SIGNIFICANT CHANGE UP (ref 80–100)
NRBC # BLD: 0 /100 WBCS — SIGNIFICANT CHANGE UP (ref 0–0)
PLATELET # BLD AUTO: 251 K/UL — SIGNIFICANT CHANGE UP (ref 150–400)
POTASSIUM SERPL-MCNC: 4.2 MMOL/L — SIGNIFICANT CHANGE UP (ref 3.5–5.3)
POTASSIUM SERPL-SCNC: 4.2 MMOL/L — SIGNIFICANT CHANGE UP (ref 3.5–5.3)
RBC # BLD: 3.3 M/UL — LOW (ref 4.2–5.8)
RBC # FLD: 11.5 % — SIGNIFICANT CHANGE UP (ref 10.3–14.5)
SARS-COV-2 RNA SPEC QL NAA+PROBE: SIGNIFICANT CHANGE UP
SODIUM SERPL-SCNC: 140 MMOL/L — SIGNIFICANT CHANGE UP (ref 135–145)
WBC # BLD: 5.4 K/UL — SIGNIFICANT CHANGE UP (ref 3.8–10.5)
WBC # FLD AUTO: 5.4 K/UL — SIGNIFICANT CHANGE UP (ref 3.8–10.5)

## 2022-04-03 RX ORDER — HYDRALAZINE HCL 50 MG
10 TABLET ORAL ONCE
Refills: 0 | Status: COMPLETED | OUTPATIENT
Start: 2022-04-03 | End: 2022-04-03

## 2022-04-03 RX ORDER — ACETYLCYSTEINE 200 MG/ML
1200 VIAL (ML) MISCELLANEOUS EVERY 12 HOURS
Refills: 0 | Status: DISCONTINUED | OUTPATIENT
Start: 2022-04-04 | End: 2022-04-04

## 2022-04-03 RX ORDER — LISINOPRIL 2.5 MG/1
40 TABLET ORAL DAILY
Refills: 0 | Status: DISCONTINUED | OUTPATIENT
Start: 2022-04-03 | End: 2022-04-03

## 2022-04-03 RX ORDER — ACETYLCYSTEINE 200 MG/ML
1200 VIAL (ML) MISCELLANEOUS ONCE
Refills: 0 | Status: COMPLETED | OUTPATIENT
Start: 2022-04-03 | End: 2022-04-03

## 2022-04-03 RX ORDER — HYDROMORPHONE HYDROCHLORIDE 2 MG/ML
0.5 INJECTION INTRAMUSCULAR; INTRAVENOUS; SUBCUTANEOUS EVERY 6 HOURS
Refills: 0 | Status: DISCONTINUED | OUTPATIENT
Start: 2022-04-03 | End: 2022-04-03

## 2022-04-03 RX ORDER — LOSARTAN POTASSIUM 100 MG/1
25 TABLET, FILM COATED ORAL DAILY
Refills: 0 | Status: DISCONTINUED | OUTPATIENT
Start: 2022-04-03 | End: 2022-04-03

## 2022-04-03 RX ORDER — CARVEDILOL PHOSPHATE 80 MG/1
6.25 CAPSULE, EXTENDED RELEASE ORAL EVERY 12 HOURS
Refills: 0 | Status: DISCONTINUED | OUTPATIENT
Start: 2022-04-03 | End: 2022-04-04

## 2022-04-03 RX ADMIN — Medication 1: at 18:12

## 2022-04-03 RX ADMIN — INSULIN GLARGINE 15 UNIT(S): 100 INJECTION, SOLUTION SUBCUTANEOUS at 21:29

## 2022-04-03 RX ADMIN — CARVEDILOL PHOSPHATE 6.25 MILLIGRAM(S): 80 CAPSULE, EXTENDED RELEASE ORAL at 18:32

## 2022-04-03 RX ADMIN — Medication 40 MILLIGRAM(S): at 05:18

## 2022-04-03 RX ADMIN — Medication 10 MILLIGRAM(S): at 02:45

## 2022-04-03 RX ADMIN — Medication 1: at 12:13

## 2022-04-03 RX ADMIN — Medication 40 MILLIGRAM(S): at 18:32

## 2022-04-03 RX ADMIN — ATORVASTATIN CALCIUM 40 MILLIGRAM(S): 80 TABLET, FILM COATED ORAL at 21:29

## 2022-04-03 RX ADMIN — Medication 81 MILLIGRAM(S): at 12:14

## 2022-04-03 NOTE — ACUTE INTERFACILITY TRANSFER NOTE - OTHER SIGNIFICANT FINDINGS
Patient name: AURORA SIMMONS  YOB: 1990   Age: 32 (M)   MR#: [id number]  Study Date: 4/1/2022  Location: [address] [place], [place] [address]ographer: [facility]  Study quality: Technically good  Referring Physician: Raghav Montero MD  Blood Pressure: 178/111 mmHg  Height: 185 cm  Weight: 171 kg  BSA: 2.8 m2  ------------------------------------------------------------------------    PROCEDURE: Transthoracic echocardiogram with 2-D, M-Mode  and complete spectral and color flow Doppler.  INDICATION: Shortness of breath (R06.02)  HISTORY:  ------------------------------------------------------------------------  DIMENSIONS:  Dimensions:     Normal Values:  LA:     4.7 cm    2.0 - 4.0 cm  Ao:     3.7 cm    2.0 - 3.8 cm  SEPTUM: 1.6 cm    0.6 - 1.2 cm  PWT:    1.6 cm    0.6 - 1.1 cm  LVIDd:  6.4 cm    3.0 - 5.6 cm  LVIDs:  5.2 cm    1.8 - 4.0 cm      Derived Variables:  LVMI: 184 g/m2  RWT: 0.50  Ejection Fraction Visual Estimate: 40-45 %    ------------------------------------------------------------------------  OBSERVATIONS:  Mitral Valve: Normal mitral valve. Mild mitral  regurgitation.  Aortic Root: Aortic Root: 3.7 cm.    Aortic Valve: Normal trileaflet aortic valve.  Left Atrium: Severely dilated left atrium.  LA volume index  = 66 cc/m2.  Left Ventricle: Moderate global left ventricular systolic  dysfunction. Moderate concentric left ventricular  hypertrophy. Grade I diastolic dysfunction (Impaired  relaxation, mild).  Right Heart: Normal right atrium. Normal right ventricular  size and systolic function (TAPSE  1.9cm). There is trace  tricuspid regurgitation. There is trace pulmonic  regurgitation.  Pericardium/PleuraSmall pericardial effusion.  Hemodynamic: Unable to estimate RVSP.  ------------------------------------------------------------------------  CONCLUSIONS:  1. Normal mitral valve. Mild mitral regurgitation.  2. Normal trileaflet aortic valve.  3. Aortic Root: 3.7 cm.  4. Severely dilated left atrium.  LA volume index = 66  cc/m2.  5. Moderate concentric left ventricular hypertrophy.  6. Moderate global left ventricular systolic dysfunction.  7. Grade I diastolic dysfunction (Impaired relaxation,  mild).  8. Normal right atrium.  9. Normal right ventricular size and systolic function  (TAPSE  1.9cm).  10. Unable to estimate RVSP.  11. There is trace tricuspid regurgitation.  12. There is trace pulmonic regurgitation.  13. Small pericardial effusion.    ------------------------------------------------------------------------  Confirmed on  4/1/2022 - 15:48:23 by Julieta Guerra MD  ------------------------------------------------------------------------

## 2022-04-03 NOTE — CONSULT NOTE ADULT - ASSESSMENT
1. CKD 3a- likely due to DM2, though could be DM2, HTN, and/or HF.  Monitor renal fxn on diuresis + with initiation of ARB + cardiac cath.  2. Acute on chronic systolic and diastolic HF- diuresis per cardiology.  Given plan to do cardiac cath, would give mucomyst 1200mg po bid today and tomorrow (day of cath) for total of 4 doses.  If possible, would hold diuretic on am of procedure, but give pm dose.  3. HTN-  BP elevated.  Antihypertensive medications per cardiology for treatment of HF and HTN.  4. Lymphedema- not due to fluid retention. At some point, would benefit from vascular evaluation and likely ace wrapping vs. compression stockings vs. invasive intervention.  This can be done as inpatient or outpatient.      [facility] NEPHROLOGY  Han Choudhury M.D.  Mekhi Verdin D.O.  Camille Brown M.D.  Lorena Wesley, MSN, ANP-C    Telephone: [phone]  Facsimile: [phone]    [address] [place] [address]  J

## 2022-04-03 NOTE — ACUTE INTERFACILITY TRANSFER NOTE - HOSPITAL COURSE
33 y/o Patient with PMH of HTN, HLD, Varicose veins presents to the ED for SOB and cough.  He states he was about to take off on a flight to [place] when he started having SOB and a cough that lasted several minutes, productive of clear sputum. He was taken off the flight and sent the ED. He has not been taking his medications for 2 months. He endorses chronic LE edema, Orthopnea and PND. Denies chest pain, fever, chills, abd pain, N/V/D, or any other complaints. Does not smoke, use ETOH or drugs. Supposed to take lisinopril 40mg, lantus 30 units, and lispro 15 units with meals. On admission, Cr was 1.83   BNP was 2900,  he was started on IV lasix  Ddimer 335, LE dopplers neg, V/Q scan with very low probability of PE.  Trop 83.6 => 88.6 => 119.2 =>63.7 => TTE showed mod global LV systolic dysfunction, EF 40-45% with mod concentric LV hypertrophy, sev dil LA, no sig valvular disease.   He was recommended by Cards to have cardiac cath to be done at [facility]

## 2022-04-03 NOTE — CONSULT NOTE ADULT - SUBJECTIVE AND OBJECTIVE BOX
Full note to follow. [facility] NEPHROLOGY- CONSULTATION NOTE    Patient is a 32y Male with DM2/HTN known to have some degree of renal dysfunction, but does not regularly f/u with PMD who presented to the hospital with SOB and was found to have new onset CHF.   He was found to have elevated creatinine for which nephrology is consulted.  Of note, pt has known lymphedema since childhood.    Pt feels better with diuresis.  Pt being started on losartan and BB for new CHF and uncontrolled HTN.    PAST MEDICAL & SURGICAL HISTORY:  Diabetes mellitus    Hypertension      Keflex (Hives)    Home Medications Reviewed  Hospital Medications:   MEDICATIONS  (STANDING):  aspirin enteric coated 81 milliGRAM(s) Oral daily  atorvastatin 40 milliGRAM(s) Oral at bedtime  carvedilol 6.25 milliGRAM(s) Oral every 12 hours  furosemide   Injectable 40 milliGRAM(s) IV Push two times a day  insulin glargine Injectable (LANTUS) 15 Unit(s) SubCutaneous at bedtime  insulin lispro (ADMELOG) corrective regimen sliding scale   SubCutaneous three times a day before meals    SOCIAL HISTORY:  Denies ETOh,Smoking,   FAMILY HISTORY:  Mother with CKD stage 3, other family members with later stage CKD    REVIEW OF SYSTEMS:  CONSTITUTIONAL: +weakness, fevers or chills  EYES/ENT: No visual changes;  No vertigo or throat pain   NECK: No pain or stiffness  RESPIRATORY: No cough, wheezing, hemoptysis; improved shortness of breath  CARDIOVASCULAR: No chest pain or palpitations.  GASTROINTESTINAL: No abdominal or epigastric pain. No nausea, vomiting, or hematemesis; No diarrhea or constipation. No melena or hematochezia.  GENITOURINARY: No dysuria, frequency, foamy urine, urinary urgency, incontinence or hematuria  NEUROLOGICAL: No numbness or weakness  SKIN: No itching, burning, rashes, or lesions   VASCULAR: +bilateral lower extremity edema.   All other review of systems is negative unless indicated above.    VITALS:  T(F): 98.3 (22 @ 20:44), Max: 98.3 (22 @ 20:44)  HR: 94 (22 @ 20:44)  BP: 158/92 (22 @ 20:44)  RR: 18 (22 @ 20:44)  SpO2: 98% (22 @ 20:44)  Wt(kg): --    04-02 @ 07:01  -   @ 07:00  --------------------------------------------------------  IN: 0 mL / OUT: 975 mL / NET: -975 mL          PHYSICAL EXAM:  Constitutional: NAD  HEENT: anicteric sclera, oropharynx clear, MMM  Neck: No JVD  Respiratory: CTAB, no wheezes, rales or rhonchi  Cardiovascular: S1, S2, RRR  Gastrointestinal: BS+, soft, NT/ND  Extremities: No cyanosis or clubbing. severe B non-pitting LE edema  Neurological: A/O x 3, no focal deficits  Psychiatric: Normal mood, normal affect  : No CVA tenderness. No qureshi.   Skin: No rashes    LABS:      140  |  106  |  30<H>  ----------------------------<  96  4.2   |  27  |  1.64<H>    Ca    8.4      2022 07:45      Creatinine Trend: 1.64 <--, 1.63 <--, 1.65 <--, 1.83 <--                        10.2   5.40  )-----------( 251      ( 2022 07:45 )             29.9     Urine Studies:  Urinalysis Basic - ( 2022 22:34 )    Color: Yellow / Appearance: Clear / S.015 / pH:   Gluc:  / Ketone: Negative  / Bili: Negative / Urobili: Negative   Blood:  / Protein: 100 / Nitrite: Negative   Leuk Esterase: Negative / RBC: 5-10 /HPF / WBC 0-2 /HPF   Sq Epi:  / Non Sq Epi: Occasional /HPF / Bacteria: Trace /HPF      Sodium, Random Urine: 95 mmol/L ( @ 22:33)  Creatinine, Random Urine: 62 mg/dL ( @ 22:33)    RADIOLOGY & ADDITIONAL STUDIES:    < from: Xray Chest 1 View- PORTABLE-Urgent (22 @ 13:22) >    ACC: [id number] EXAM:  XR CHEST PORTABLE URGENT 1V                          PROCEDURE DATE:  2022          INTERPRETATION:  AP erect chest on 2022 at 1:11 PM. Patient has   cough and short of breath.    COMPARISON: None available.    Heart magnified by technique.    No definite infiltrate.    IMPRESSION: No definite infiltrate.    --- End of Report ---            ALEXIS BARNEY MD; Attending Radiologist  This document has been electronically signed. Mar 31 2022  1:28PM    < end of copied text >        < from: NM Pulmonary Ventilation/Perfusion Scan (22 @ 10:10) >    ACC: [id number] EXAM:  NM PULM VENTILATION PERFUS IMG                          PROCEDURE DATE:  2022          INTERPRETATION:  CLINICAL INFORMATION: 32 year old male with dyspnea,   elevated serum creatinine level; referred to evaluate for pulmonary   embolism.    RADIOPHARMACEUTICAL: 1.0 mCi Tc-99m-DTPA via inhalation; 6.2 mCi   Tc-99m-MAA, I.V.    TECHNIQUE:  Ventilation and perfusion images of the lungs were obtained   following administration of Tc-99m-DTPA and Tc-99m-MAA. Images were   obtained in the anterior, posterior, both lateral, and all 4 oblique   projections. The study was interpreted in conjunction with a chest   radiograph of 3/31/2022.    COMPARISON: No prior lung V/Q scan.    FINDINGS: There is heterogeneous distribution of radiopharmaceutical in   the lungs on the ventilation and perfusion images. There are no segmental   perfusion defects.    IMPRESSION: Very low probability of pulmonary embolus.    --- End of Report ---            INDIRA TELLES MD; Attending Nuclear Medicine  This document has been electronically signed. 2022 10:21AM    < end of copied text >        < from: Transthoracic Echocardiogram (22 @ 07:17) >    Patient name: AURORA SIMMONS  YOB: 1990   Age: 32 (M)   MR#: [id number]  Study Date: 2022  Location: [address] [place], [place] [address]ographer: Ryan Lama [facility]  Study quality: Technically good  Referring Physician: Raghav Montero MD  Blood Pressure: 178/111 mmHg  Height: 185 cm  Weight: 171 kg  BSA: 2.8 m2  ------------------------------------------------------------------------    PROCEDURE: Transthoracic echocardiogram with 2-D, M-Mode  and complete spectral and color flow Doppler.  INDICATION: Shortness of breath (R06.02)  HISTORY:  ------------------------------------------------------------------------  DIMENSIONS:  Dimensions:     Normal Values:  LA:     4.7 cm    2.0 - 4.0 cm  Ao:     3.7 cm    2.0 - 3.8 cm  SEPTUM: 1.6 cm    0.6 - 1.2 cm  PWT:    1.6 cm    0.6 - 1.1 cm  LVIDd:  6.4 cm    3.0 - 5.6 cm  LVIDs:  5.2 cm    1.8 - 4.0 cm      Derived Variables:  LVMI: 184 g/m2  RWT: 0.50  Ejection Fraction Visual Estimate: 40-45 %    ------------------------------------------------------------------------  OBSERVATIONS:  Mitral Valve: Normal mitral valve. Mild mitral  regurgitation.  Aortic Root: Aortic Root: 3.7 cm.    Aortic Valve: Normal trileaflet aortic valve.  Left Atrium: Severely dilated left atrium.  LA volume index  = 66 cc/m2.  Left Ventricle: Moderate global left ventricular systolic  dysfunction. Moderate concentric left ventricular  hypertrophy. Grade I diastolic dysfunction (Impaired  relaxation, mild).  Right Heart: Normal right atrium. Normal right ventricular  size and systolic function (TAPSE  1.9cm). There is trace  tricuspid regurgitation. There is trace pulmonic  regurgitation.  Pericardium/PleuraSmall pericardial effusion.  Hemodynamic: Unable to estimate RVSP.  ------------------------------------------------------------------------  CONCLUSIONS:  1. Normal mitral valve. Mild mitral regurgitation.  2. Normal trileaflet aortic valve.  3. Aortic Root: 3.7 cm.  4. Severely dilated left atrium.  LA volume index = 66  cc/m2.  5. Moderate concentric left ventricular hypertrophy.  6. Moderate global left ventricular systolic dysfunction.  7. Grade I diastolic dysfunction (Impaired relaxation,  mild).  8. Normal right atrium.  9. Normal right ventricular size and systolic function  (TAPSE  1.9cm).  10. Unable to estimate RVSP.  11. There is trace tricuspid regurgitation.  12. There is trace pulmonic regurgitation.  13. Small pericardial effusion.    ------------------------------------------------------------------------  Confirmed on  2022 - 15:48:23 by Julieta Guerra MD  ------------------------------------------------------------------------    < end of copied text >

## 2022-04-03 NOTE — PROGRESS NOTE ADULT - SUBJECTIVE AND OBJECTIVE BOX
pt seen and examined, no events overnight          aspirin enteric coated 81 milliGRAM(s) Oral daily  atorvastatin 40 milliGRAM(s) Oral at bedtime  enoxaparin Injectable 40 milliGRAM(s) SubCutaneous every 24 hours  furosemide   Injectable 40 milliGRAM(s) IV Push two times a day  insulin glargine Injectable (LANTUS) 15 Unit(s) SubCutaneous at bedtime  insulin lispro (ADMELOG) corrective regimen sliding scale   SubCutaneous three times a day before meals                            10.2   5.40  )-----------( 251      ( 03 Apr 2022 07:45 )             29.9       Hemoglobin: 10.2 g/dL (04-03 @ 07:45)  Hemoglobin: 10.5 g/dL (04-02 @ 10:40)  Hemoglobin: 10.9 g/dL (04-01 @ 07:43)  Hemoglobin: 11.7 g/dL (03-31 @ 13:37)      04-03    140  |  106  |  30<H>  ----------------------------<  96  4.2   |  27  |  1.64<H>    Ca    8.4      03 Apr 2022 07:45      Creatinine Trend: 1.64<--, 1.63<--, 1.65<--, 1.83<--    COAGS:           T(C): 36.4 (04-03-22 @ 04:35), Max: 36.8 (04-02-22 @ 09:31)  HR: 100 (04-03-22 @ 04:35) (91 - 105)  BP: 126/71 (04-03-22 @ 04:35) (126/71 - 169/96)  RR: 18 (04-03-22 @ 04:35) (17 - 18)  SpO2: 97% (04-03-22 @ 04:35) (96% - 100%)  Wt(kg): --    I&O's Summary    02 Apr 2022 07:01  -  03 Apr 2022 07:00  --------------------------------------------------------  IN: 0 mL / OUT: 975 mL / NET: -975 mL        Gen: Appears well in NAD  HEENT:  (-)icterus (-)pallor  CV: N S1 S2 1/6 CAMRON (+)2 Pulses B/l  Resp:  Clear to ausculatation B/L, normal effort  GI: (+) BS Soft, NT, ND  Lymph:  (+)Edema, (-)obvious lymphadenopathy  Skin: Warm to touch, Normal turgor  Psych: Appropriate mood and affect    ECG:  	Sinus Tach 105 BPM    RADIOLOGY:         CXR:  no infiltrate     ECHO: < from: Transthoracic Echocardiogram (04.01.22 @ 07:17) >  CONCLUSIONS:  1. Normal mitral valve. Mild mitral regurgitation.  2. Normal trileaflet aortic valve.  3. Aortic Root: 3.7 cm.  4. Severely dilated left atrium.  LA volume index = 66  cc/m2.  5. Moderate concentric left ventricular hypertrophy.  6. Moderate global left ventricular systolic dysfunction.  7. Grade I diastolic dysfunction (Impaired relaxation,  mild).  8. Normal right atrium.  9. Normal right ventricular size and systolic function  (TAPSE  1.9cm).  10. Unable to estimate RVSP.  11. There is trace tricuspid regurgitation.  12. There is trace pulmonic regurgitation.  13. Small pericardial effusion.    ------------------------------------------------------------------------  Confirmed on  4/1/2022 - 15:48:23 by Julieta Guerra MD  ------------------------------------------------------------------------    < end of copied text >      ASSESSMENT/PLAN: 	32y Male  PMH of HTN, HLD, Varicose veins presents to the ED for SOB and cough positive trop elevated BNP.    - cont ASA, statin   - Keep net neg w/ IV lasix   - cardiac cath this monday at [facility]   - NPO p mn, hold lovenox, transfer to [facility] in am   - Echo noted  - IV lasix to keep net negative  - Dailyl weights.

## 2022-04-03 NOTE — CHART NOTE - NSCHARTNOTEFT_GEN_A_CORE
EVENT:  31 y/o male with PMH of HTN, HLD, Varicose veins presents to the ED for SOB and cough.  He states he was about to take off on a flight to [place] when he started having SOB and a cough that lasted several minutes, productive of clear sputum. He was taken off the flight and sent the ED. He has not been taking his medications for 2 months. He endorses chronic LE edema, Orthopnea and PND. Denies chest pain, fever, chills, abd pain, N/V/D, or any other complaints. Does not smoke, use ETOH or drugs. Supposed to take lisinopril 40mg, lantus 30 units, and lispro 15 units with meals.     SUBJECTIVE:  Patient with history of HTN, medications not taken for past 2 months  BP elevated-158/100, 160/104, 169/96, 126/71    OBJECTIVE:  Vital Signs Last 24 Hrs  T(C): 36.4 (03 Apr 2022 04:35), Max: 36.8 (02 Apr 2022 09:31)  T(F): 97.6 (03 Apr 2022 04:35), Max: 98.3 (02 Apr 2022 09:31)  HR: 100 (03 Apr 2022 04:35) (91 - 105)  BP: 126/71 (03 Apr 2022 04:35) (126/71 - 169/96)  BP(mean): --  RR: 18 (03 Apr 2022 04:35) (17 - 18)  SpO2: 97% (03 Apr 2022 04:35) (96% - 100%)    LABS:                        10.5   6.58  )-----------( 257      ( 02 Apr 2022 10:40 )             29.9     04-02    138  |  106  |  31<H>  ----------------------------<  183<H>  4.3   |  25  |  1.63<H>    Ca    8.3<L>      02 Apr 2022 10:40      Problem:   Uncontrolled HTN due to noncompliance with medication  Plan:  -Hydralazine 10mg IVP given  -Continue Furosemide 40mg twice daily  -Start Lisinopril 40mg once daily  -Monitor BP EVENT:  33 y/o male with PMH of HTN, HLD, Varicose veins presents to the ED for SOB and cough.  He states he was about to take off on a flight to [place] when he started having SOB and a cough that lasted several minutes, productive of clear sputum. He was taken off the flight and sent the ED. He has not been taking his medications for 2 months. He endorses chronic LE edema, Orthopnea and PND. Denies chest pain, fever, chills, abd pain, N/V/D, or any other complaints. Does not smoke, use ETOH or drugs. Supposed to take lisinopril 40mg, lantus 30 units, and lispro 15 units with meals.     SUBJECTIVE:  Patient with history of HTN, medications not taken for past 2 months  BP elevated-158/100, 160/104, 169/96, 126/71    OBJECTIVE:  Vital Signs Last 24 Hrs  T(C): 36.4 (03 Apr 2022 04:35), Max: 36.8 (02 Apr 2022 09:31)  T(F): 97.6 (03 Apr 2022 04:35), Max: 98.3 (02 Apr 2022 09:31)  HR: 100 (03 Apr 2022 04:35) (91 - 105)  BP: 126/71 (03 Apr 2022 04:35) (126/71 - 169/96)  BP(mean): --  RR: 18 (03 Apr 2022 04:35) (17 - 18)  SpO2: 97% (03 Apr 2022 04:35) (96% - 100%)    LABS:                        10.5   6.58  )-----------( 257      ( 02 Apr 2022 10:40 )             29.9     04-02    138  |  106  |  31<H>  ----------------------------<  183<H>  4.3   |  25  |  1.63<H>    Ca    8.3<L>      02 Apr 2022 10:40      Problem:   Uncontrolled HTN due to noncompliance with medication  Plan:  -Hydralazine 10mg IVP given  -Continue Furosemide 40mg twice daily  -Cardiology following  -For cath at [facility] on Monday  -Monitor BP

## 2022-04-03 NOTE — PROGRESS NOTE ADULT - SUBJECTIVE AND OBJECTIVE BOX
Patient is a 32y old  Male who presents with a chief complaint of SOB (03 Apr 2022 14:31)    PATIENT IS SEEN AND EXAMINED IN MEDICAL FLOOR.  NGT [    ]    SMALL [   ]      GT [   ]    ALLERGIES:  Keflex (Hives)      Daily     Daily     VITALS:    Vital Signs Last 24 Hrs  T(C): 36.8 (03 Apr 2022 20:44), Max: 36.8 (03 Apr 2022 14:10)  T(F): 98.3 (03 Apr 2022 20:44), Max: 98.3 (03 Apr 2022 20:44)  HR: 94 (03 Apr 2022 20:44) (91 - 100)  BP: 158/92 (03 Apr 2022 20:44) (126/71 - 169/96)  BP(mean): --  RR: 18 (03 Apr 2022 20:44) (16 - 18)  SpO2: 98% (03 Apr 2022 20:44) (95% - 99%)    LABS:    CBC Full  -  ( 03 Apr 2022 07:45 )  WBC Count : 5.40 K/uL  RBC Count : 3.30 M/uL  Hemoglobin : 10.2 g/dL  Hematocrit : 29.9 %  Platelet Count - Automated : 251 K/uL  Mean Cell Volume : 90.6 fl  Mean Cell Hemoglobin : 30.9 pg  Mean Cell Hemoglobin Concentration : 34.1 gm/dL  Auto Neutrophil # : x  Auto Lymphocyte # : x  Auto Monocyte # : x  Auto Eosinophil # : x  Auto Basophil # : x  Auto Neutrophil % : x  Auto Lymphocyte % : x  Auto Monocyte % : x  Auto Eosinophil % : x  Auto Basophil % : x      04-03    140  |  106  |  30<H>  ----------------------------<  96  4.2   |  27  |  1.64<H>    Ca    8.4      03 Apr 2022 07:45      CAPILLARY BLOOD GLUCOSE      POCT Blood Glucose.: 235 mg/dL (03 Apr 2022 21:24)  POCT Blood Glucose.: 151 mg/dL (03 Apr 2022 17:13)  POCT Blood Glucose.: 157 mg/dL (03 Apr 2022 11:31)  POCT Blood Glucose.: 103 mg/dL (03 Apr 2022 07:43)          Creatinine Trend: 1.64<--, 1.63<--, 1.65<--, 1.83<--  I&O's Summary    02 Apr 2022 07:01  -  03 Apr 2022 07:00  --------------------------------------------------------  IN: 0 mL / OUT: 975 mL / NET: -975 mL                MEDICATIONS:    MEDICATIONS  (STANDING):  aspirin enteric coated 81 milliGRAM(s) Oral daily  atorvastatin 40 milliGRAM(s) Oral at bedtime  carvedilol 6.25 milliGRAM(s) Oral every 12 hours  furosemide   Injectable 40 milliGRAM(s) IV Push two times a day  insulin glargine Injectable (LANTUS) 15 Unit(s) SubCutaneous at bedtime  insulin lispro (ADMELOG) corrective regimen sliding scale   SubCutaneous three times a day before meals      MEDICATIONS  (PRN):      REVIEW OF SYSTEMS:                           ALL ROS DONE [ X   ]    CONSTITUTIONAL:  LETHARGIC [   ], FEVER [   ], UNRESPONSIVE [   ]  CVS:  CP  [   ], SOB, [   ], PALPITATIONS [   ], DIZZYNESS [   ]  RS: COUGH [   ], SPUTUM [   ]  GI: ABDOMINAL PAIN [   ], NAUSEA [   ], VOMITINGS [   ], DIARRHEA [   ], CONSTIPATION [   ]  :  DYSURIA [   ], NOCTURIA [   ], INCREASED FREQUENCY [   ], DRIBLING [   ],  SKELETAL: PAINFUL JOINTS [   ], SWOLLEN JOINTS [   ], NECK ACHE [   ], LOW BACK ACHE [   ],  SKIN : ULCERS [   ], RASH [   ], ITCHING [   ]  CNS: HEAD ACHE [   ], DOUBLE VISION [   ], BLURRED VISION [   ], AMS / CONFUSION [   ], SEIZURES [   ], WEAKNESS [   ],TINGLING / NUMBNESS [   ]      PHYSICAL EXAMINATION:  GENERAL APPEARANCE: NO DISTRESS  HEENT:  NO PALLOR, NO  JVD,  NO   NODES, NECK SUPPLE  CVS: S1 +, S2 +,   RS: AEEB,  OCCASIONAL  RALES +,   NO RONCHI  ABD: SOFT, NT, NO, BS +  EXT: PE ++ [B/L LE]  SKIN: WARM,   SKELETAL:  ROM ACCEPTABLE  CNS:  AAO X 3     RADIOLOGY :      ACC: [id number] EXAM:  US DPLX LWR EXT VEINS COMPL BI                          PROCEDURE DATE:  04/01/2022          INTERPRETATION:  CLINICAL INFORMATION: Shortness of breath. Diabetes.    COMPARISON: None available.    TECHNIQUE: Duplex sonography of the BILATERAL LOWER extremity veins with   color and spectral Doppler, with and without compression. The examination   is technically limited secondary to subcutaneous edema in the calves and   body habitus.    FINDINGS:    RIGHT:  Normal compressibility of the RIGHT common femoral, femoral and popliteal   veins.  Doppler examination shows normal spontaneous and phasic flow.  Calf veins not seen.    Subcutaneous edema in the calf.    LEFT:  Normal compressibility of the LEFT common femoral, femoral and popliteal   veins.  Doppler examination shows normal spontaneous and phasic flow.  Calf veins not seen.    Subcutaneous edema in the calf.    IMPRESSION:    Technically limited study including nonvisualization of the calf veins   with no evidence of deep venous thrombosis in the visualized bilateral   lower extremity veins.    ============================      ACC: [id number] EXAM:  NM PULM VENTILATION PERFUS IMG                          PROCEDURE DATE:  04/01/2022          INTERPRETATION:  CLINICAL INFORMATION: 32 year old male with dyspnea,   elevated serum creatinine level; referred to evaluate for pulmonary   embolism.    RADIOPHARMACEUTICAL: 1.0 mCi Tc-99m-DTPA via inhalation; 6.2 mCi   Tc-99m-MAA, I.V.    TECHNIQUE:  Ventilation and perfusion images of the lungs were obtained   following administration of Tc-99m-DTPA and Tc-99m-MAA. Images were   obtained in the anterior, posterior, both lateral, and all 4 oblique   projections. The study was interpreted in conjunction with a chest   radiograph of 3/31/2022.    COMPARISON: No prior lung V/Q scan.    FINDINGS: There is heterogeneous distribution of radiopharmaceutical in   the lungs on the ventilation and perfusion images. There are no segmental   perfusion defects.    IMPRESSION: Very low probability of pulmonary embolus.    ===============================    ACC: [id number] EXAM:  XR CHEST PORTABLE URGENT 1V                          PROCEDURE DATE:  03/31/2022          INTERPRETATION:  AP erect chest on March 31, 2022 at 1:11 PM. Patient has   cough and short of breath.    COMPARISON: None available.    Heart magnified by technique.    No definite infiltrate.    IMPRESSION: No definite infiltrate.    ASSESSMENT :     Heart failure    Diabetes mellitus    Hypertension        PLAN:  HPI:  31 y/o Patient with PMH of HTN, HLD, Varicose veins presents to the ED for SOB and cough.  He states he was about to take off on a flight to [place] when he started having SOB and a cough that lasted several minutes, productive of clear sputum. He was taken off the flight and sent the ED. He has not been taking his medications for 2 months. He endorses chronic LE edema, Orthopnea and PND. Denies chest pain, fever, chills, abd pain, N/V/D, or any other complaints. Does not smoke, use ETOH or drugs. Supposed to take lisinopril 40mg, lantus 30 units, and lispro 15 units with meals. (31 Mar 2022 19:04)    # CASE D/W MOTHER, ALBERTO SIMMONS @ BEDSIDE[4/3] - ALL QUESTIONS ANSWERED      # SUSPECT NEW ONSET CHF - SYSTOLIC AND DIASTOLIC DYSFUNCTION  # HYPERTENSIVE URGENCY, HTN  # B/L LE EDEMA - SUSPECT S/T CHF AND ?VARICOSE VEINS  - MONITOR ON TELEMETRY, TRENDED TROPONINS  - LASIX, STATIN, COREG  - REVIEWED TSH/LIPID PANEL/HBA1C  - CARDIOLOGY CONSULT    - ECHO - MILD MR, DILATED LA, CONCENTRIC LVH, GLOBAL LV SYSTOLIC DYSFUNCTION, G1DD  - PLANNED FOR TRANSFER TO [facility] FOR ? CARDIAC CATH    # ELEVATED D-DIMER  - V/Q SCAN - LOW PROBABILITY PE  - DOPPLER LE - NEGATIVE FOR DVT    # SUSANA VS. CKD - REVIEWED UA, F/U BLADDER SCAN, MONITOR CR, AVOID NEPHROTOXIC AGENTS  - NEPHROLOGY CONSULT IN PROGRESS    # SUSPECT CAMERON  - OUTPATIENT SLEEP STUDY    # MORBID OBESITY   - NUTRITION CONSULT    # DM2, NONCOMPLIANT W/ INSULIN - HBA1C 9.4 - LANTUS, SSI + FS    # HLD - STARTED STATIN, REVIEWED LIPID PANEL    # GI AND DVT PPX

## 2022-04-03 NOTE — ACUTE INTERFACILITY TRANSFER NOTE - PLAN OF CARE
On lantus 30U and lispro 15U   Not taking it   Will start on 30U lantus and ISS   A1C - 9.4 33 y/o Patient with PMH of HTN, HLD, Varicose veins presents to the ED for SOB and cough.  Admitted for SOB. ·  Problem: HTN (hypertension).   ·  Plan: On lisinopril at home but not taking it  Will hold for SUSANA  Will start on hydralazine and lasix. ·  Problem: Dyspnea.   ·  Plan: Presents with dyspnea and cough  CHF vs CAD vs PNA   T1 83.6, T2 88.6, Follow T3  EKG: NSR  Pro BNP 2900  CXR: increased vascular markings   on Lasix 40IV BID  Aspirin and atorvastatin   Echo revealed - Aortic Root: 3.7 cm., Severely dilated left atrium.  LA volume index = 66  cc/m2., Moderate concentric left ventricular hypertrophy., Moderate global left ventricular systolic dysfunction., Grade I diastolic dysfunction (Impaired relaxation, mild) - Detailed report , please see below  Dr. Evangelista  following   VQ complete, very low probability of PE  LE doppler complete, negative for DVT SUSANA (acute kidney injury).   ·  Plan: BUN/Cr: 31/1.86  Cr - Downtrending   Likely prerenal   Avoid nephrotoxins  Continue to monitor BMP daily 33 y/o Patient with PMH of HTN, HLD, Varicose veins presents to the ED for SOB and cough.  Admitted for SOB.  Patient had ECHO done and found to have new onset CHF.   Cardiology dr. Evangelista consulted. Decision was made to transfer patient to [facility] Cath lab. He was found to have elevated creatinine for which nephrology is consulted.

## 2022-04-04 ENCOUNTER — INPATIENT (INPATIENT)
Facility: HOSPITAL | Age: 32
LOS: 10 days | Discharge: HOME | End: 2022-04-15
Attending: INTERNAL MEDICINE | Admitting: INTERNAL MEDICINE
Payer: MEDICAID

## 2022-04-04 VITALS
SYSTOLIC BLOOD PRESSURE: 135 MMHG | RESPIRATION RATE: 18 BRPM | OXYGEN SATURATION: 100 % | WEIGHT: 315 LBS | DIASTOLIC BLOOD PRESSURE: 86 MMHG | HEART RATE: 102 BPM | TEMPERATURE: 98 F | HEIGHT: 73 IN

## 2022-04-04 VITALS
OXYGEN SATURATION: 100 % | TEMPERATURE: 99 F | DIASTOLIC BLOOD PRESSURE: 94 MMHG | HEART RATE: 92 BPM | SYSTOLIC BLOOD PRESSURE: 157 MMHG | RESPIRATION RATE: 17 BRPM

## 2022-04-04 DIAGNOSIS — E11.9 TYPE 2 DIABETES MELLITUS WITHOUT COMPLICATIONS: ICD-10-CM

## 2022-04-04 DIAGNOSIS — I10 ESSENTIAL (PRIMARY) HYPERTENSION: ICD-10-CM

## 2022-04-04 DIAGNOSIS — I89.0 LYMPHEDEMA, NOT ELSEWHERE CLASSIFIED: ICD-10-CM

## 2022-04-04 DIAGNOSIS — R07.9 CHEST PAIN, UNSPECIFIED: ICD-10-CM

## 2022-04-04 DIAGNOSIS — I50.41 ACUTE COMBINED SYSTOLIC (CONGESTIVE) AND DIASTOLIC (CONGESTIVE) HEART FAILURE: ICD-10-CM

## 2022-04-04 LAB
ALBUMIN SERPL ELPH-MCNC: 2 G/DL — LOW (ref 3.5–5)
ALP SERPL-CCNC: 58 U/L — SIGNIFICANT CHANGE UP (ref 40–120)
ALT FLD-CCNC: 30 U/L DA — SIGNIFICANT CHANGE UP (ref 10–60)
ANION GAP SERPL CALC-SCNC: 4 MMOL/L — LOW (ref 5–17)
AST SERPL-CCNC: 27 U/L — SIGNIFICANT CHANGE UP (ref 10–40)
BASOPHILS # BLD AUTO: 0.04 K/UL — SIGNIFICANT CHANGE UP (ref 0–0.2)
BASOPHILS NFR BLD AUTO: 0.7 % — SIGNIFICANT CHANGE UP (ref 0–2)
BILIRUB SERPL-MCNC: 1.4 MG/DL — HIGH (ref 0.2–1.2)
BUN SERPL-MCNC: 32 MG/DL — HIGH (ref 7–18)
CALCIUM SERPL-MCNC: 8.4 MG/DL — SIGNIFICANT CHANGE UP (ref 8.4–10.5)
CHLORIDE SERPL-SCNC: 105 MMOL/L — SIGNIFICANT CHANGE UP (ref 96–108)
CO2 SERPL-SCNC: 28 MMOL/L — SIGNIFICANT CHANGE UP (ref 22–31)
CREAT SERPL-MCNC: 1.64 MG/DL — HIGH (ref 0.5–1.3)
EGFR: 57 ML/MIN/1.73M2 — LOW
EOSINOPHIL # BLD AUTO: 0.18 K/UL — SIGNIFICANT CHANGE UP (ref 0–0.5)
EOSINOPHIL NFR BLD AUTO: 3 % — SIGNIFICANT CHANGE UP (ref 0–6)
GLUCOSE BLDC GLUCOMTR-MCNC: 126 MG/DL — HIGH (ref 70–99)
GLUCOSE BLDC GLUCOMTR-MCNC: 139 MG/DL — HIGH (ref 70–99)
GLUCOSE BLDC GLUCOMTR-MCNC: 160 MG/DL — HIGH (ref 70–99)
GLUCOSE BLDC GLUCOMTR-MCNC: 191 MG/DL — HIGH (ref 70–99)
GLUCOSE BLDC GLUCOMTR-MCNC: 227 MG/DL — HIGH (ref 70–99)
GLUCOSE SERPL-MCNC: 178 MG/DL — HIGH (ref 70–99)
HCT VFR BLD CALC: 27.5 % — LOW (ref 39–50)
HGB BLD-MCNC: 9.6 G/DL — LOW (ref 13–17)
IMM GRANULOCYTES NFR BLD AUTO: 0.3 % — SIGNIFICANT CHANGE UP (ref 0–1.5)
LYMPHOCYTES # BLD AUTO: 1.81 K/UL — SIGNIFICANT CHANGE UP (ref 1–3.3)
LYMPHOCYTES # BLD AUTO: 30.6 % — SIGNIFICANT CHANGE UP (ref 13–44)
MAGNESIUM SERPL-MCNC: 2 MG/DL — SIGNIFICANT CHANGE UP (ref 1.6–2.6)
MCHC RBC-ENTMCNC: 31.7 PG — SIGNIFICANT CHANGE UP (ref 27–34)
MCHC RBC-ENTMCNC: 34.9 GM/DL — SIGNIFICANT CHANGE UP (ref 32–36)
MCV RBC AUTO: 90.8 FL — SIGNIFICANT CHANGE UP (ref 80–100)
MONOCYTES # BLD AUTO: 0.65 K/UL — SIGNIFICANT CHANGE UP (ref 0–0.9)
MONOCYTES NFR BLD AUTO: 11 % — SIGNIFICANT CHANGE UP (ref 2–14)
NEUTROPHILS # BLD AUTO: 3.21 K/UL — SIGNIFICANT CHANGE UP (ref 1.8–7.4)
NEUTROPHILS NFR BLD AUTO: 54.4 % — SIGNIFICANT CHANGE UP (ref 43–77)
NRBC # BLD: 0 /100 WBCS — SIGNIFICANT CHANGE UP (ref 0–0)
PHOSPHATE SERPL-MCNC: 4.7 MG/DL — HIGH (ref 2.5–4.5)
PLATELET # BLD AUTO: 244 K/UL — SIGNIFICANT CHANGE UP (ref 150–400)
POTASSIUM SERPL-MCNC: 4.3 MMOL/L — SIGNIFICANT CHANGE UP (ref 3.5–5.3)
POTASSIUM SERPL-SCNC: 4.3 MMOL/L — SIGNIFICANT CHANGE UP (ref 3.5–5.3)
PROT SERPL-MCNC: 5.4 G/DL — LOW (ref 6–8.3)
RBC # BLD: 3.03 M/UL — LOW (ref 4.2–5.8)
RBC # FLD: 11.3 % — SIGNIFICANT CHANGE UP (ref 10.3–14.5)
SODIUM SERPL-SCNC: 137 MMOL/L — SIGNIFICANT CHANGE UP (ref 135–145)
WBC # BLD: 5.91 K/UL — SIGNIFICANT CHANGE UP (ref 3.8–10.5)
WBC # FLD AUTO: 5.91 K/UL — SIGNIFICANT CHANGE UP (ref 3.8–10.5)

## 2022-04-04 PROCEDURE — 93970 EXTREMITY STUDY: CPT

## 2022-04-04 PROCEDURE — 85027 COMPLETE CBC AUTOMATED: CPT

## 2022-04-04 PROCEDURE — 84443 ASSAY THYROID STIM HORMONE: CPT

## 2022-04-04 PROCEDURE — 84484 ASSAY OF TROPONIN QUANT: CPT

## 2022-04-04 PROCEDURE — 82570 ASSAY OF URINE CREATININE: CPT

## 2022-04-04 PROCEDURE — 80061 LIPID PANEL: CPT

## 2022-04-04 PROCEDURE — 96374 THER/PROPH/DIAG INJ IV PUSH: CPT

## 2022-04-04 PROCEDURE — 84100 ASSAY OF PHOSPHORUS: CPT

## 2022-04-04 PROCEDURE — 80053 COMPREHEN METABOLIC PANEL: CPT

## 2022-04-04 PROCEDURE — 84300 ASSAY OF URINE SODIUM: CPT

## 2022-04-04 PROCEDURE — 96376 TX/PRO/DX INJ SAME DRUG ADON: CPT

## 2022-04-04 PROCEDURE — 83880 ASSAY OF NATRIURETIC PEPTIDE: CPT

## 2022-04-04 PROCEDURE — 36415 COLL VENOUS BLD VENIPUNCTURE: CPT

## 2022-04-04 PROCEDURE — 93306 TTE W/DOPPLER COMPLETE: CPT

## 2022-04-04 PROCEDURE — 78582 LUNG VENTILAT&PERFUS IMAGING: CPT

## 2022-04-04 PROCEDURE — 81001 URINALYSIS AUTO W/SCOPE: CPT

## 2022-04-04 PROCEDURE — 0225U NFCT DS DNA&RNA 21 SARSCOV2: CPT

## 2022-04-04 PROCEDURE — 82962 GLUCOSE BLOOD TEST: CPT

## 2022-04-04 PROCEDURE — 99222 1ST HOSP IP/OBS MODERATE 55: CPT

## 2022-04-04 PROCEDURE — 83036 HEMOGLOBIN GLYCOSYLATED A1C: CPT

## 2022-04-04 PROCEDURE — 99285 EMERGENCY DEPT VISIT HI MDM: CPT

## 2022-04-04 PROCEDURE — 93005 ELECTROCARDIOGRAM TRACING: CPT

## 2022-04-04 PROCEDURE — 80048 BASIC METABOLIC PNL TOTAL CA: CPT

## 2022-04-04 PROCEDURE — 85379 FIBRIN DEGRADATION QUANT: CPT

## 2022-04-04 PROCEDURE — 87635 SARS-COV-2 COVID-19 AMP PRB: CPT

## 2022-04-04 PROCEDURE — 83735 ASSAY OF MAGNESIUM: CPT

## 2022-04-04 PROCEDURE — 71045 X-RAY EXAM CHEST 1 VIEW: CPT

## 2022-04-04 PROCEDURE — 93010 ELECTROCARDIOGRAM REPORT: CPT

## 2022-04-04 PROCEDURE — 85025 COMPLETE CBC W/AUTO DIFF WBC: CPT

## 2022-04-04 RX ORDER — CARVEDILOL PHOSPHATE 80 MG/1
1 CAPSULE, EXTENDED RELEASE ORAL
Qty: 0 | Refills: 0 | DISCHARGE
Start: 2022-04-04

## 2022-04-04 RX ORDER — GLUCAGON INJECTION, SOLUTION 0.5 MG/.1ML
1 INJECTION, SOLUTION SUBCUTANEOUS ONCE
Refills: 0 | Status: DISCONTINUED | OUTPATIENT
Start: 2022-04-04 | End: 2022-04-15

## 2022-04-04 RX ORDER — FUROSEMIDE 40 MG
40 TABLET ORAL
Refills: 0 | Status: DISCONTINUED | OUTPATIENT
Start: 2022-04-05 | End: 2022-04-05

## 2022-04-04 RX ORDER — LISINOPRIL 2.5 MG/1
1 TABLET ORAL
Qty: 0 | Refills: 0 | DISCHARGE

## 2022-04-04 RX ORDER — INSULIN LISPRO 100/ML
15 VIAL (ML) SUBCUTANEOUS
Refills: 0 | Status: DISCONTINUED | OUTPATIENT
Start: 2022-04-04 | End: 2022-04-15

## 2022-04-04 RX ORDER — SODIUM CHLORIDE 9 MG/ML
1000 INJECTION, SOLUTION INTRAVENOUS
Refills: 0 | Status: DISCONTINUED | OUTPATIENT
Start: 2022-04-04 | End: 2022-04-15

## 2022-04-04 RX ORDER — INSULIN LISPRO 100/ML
VIAL (ML) SUBCUTANEOUS
Refills: 0 | Status: DISCONTINUED | OUTPATIENT
Start: 2022-04-04 | End: 2022-04-15

## 2022-04-04 RX ORDER — INSULIN GLARGINE 100 [IU]/ML
40 INJECTION, SOLUTION SUBCUTANEOUS
Qty: 0 | Refills: 0 | DISCHARGE

## 2022-04-04 RX ORDER — INSULIN GLARGINE 100 [IU]/ML
40 INJECTION, SOLUTION SUBCUTANEOUS AT BEDTIME
Refills: 0 | Status: DISCONTINUED | OUTPATIENT
Start: 2022-04-04 | End: 2022-04-15

## 2022-04-04 RX ORDER — DEXTROSE 50 % IN WATER 50 %
25 SYRINGE (ML) INTRAVENOUS ONCE
Refills: 0 | Status: DISCONTINUED | OUTPATIENT
Start: 2022-04-04 | End: 2022-04-15

## 2022-04-04 RX ORDER — SODIUM CHLORIDE 9 MG/ML
3 INJECTION INTRAMUSCULAR; INTRAVENOUS; SUBCUTANEOUS EVERY 8 HOURS
Refills: 0 | Status: DISCONTINUED | OUTPATIENT
Start: 2022-04-04 | End: 2022-04-15

## 2022-04-04 RX ORDER — ASPIRIN/CALCIUM CARB/MAGNESIUM 324 MG
1 TABLET ORAL
Qty: 0 | Refills: 0 | DISCHARGE
Start: 2022-04-04

## 2022-04-04 RX ORDER — ATORVASTATIN CALCIUM 80 MG/1
40 TABLET, FILM COATED ORAL AT BEDTIME
Refills: 0 | Status: DISCONTINUED | OUTPATIENT
Start: 2022-04-04 | End: 2022-04-15

## 2022-04-04 RX ORDER — ASPIRIN/CALCIUM CARB/MAGNESIUM 324 MG
81 TABLET ORAL DAILY
Refills: 0 | Status: DISCONTINUED | OUTPATIENT
Start: 2022-04-04 | End: 2022-04-06

## 2022-04-04 RX ORDER — HEPARIN SODIUM 5000 [USP'U]/ML
5000 INJECTION INTRAVENOUS; SUBCUTANEOUS EVERY 12 HOURS
Refills: 0 | Status: DISCONTINUED | OUTPATIENT
Start: 2022-04-04 | End: 2022-04-13

## 2022-04-04 RX ORDER — ACETYLCYSTEINE 200 MG/ML
1200 VIAL (ML) MISCELLANEOUS EVERY 12 HOURS
Refills: 0 | Status: DISCONTINUED | OUTPATIENT
Start: 2022-04-04 | End: 2022-04-05

## 2022-04-04 RX ORDER — ACETYLCYSTEINE 200 MG/ML
1200 VIAL (ML) MISCELLANEOUS
Qty: 0 | Refills: 0 | DISCHARGE
Start: 2022-04-04 | End: 2022-04-05

## 2022-04-04 RX ORDER — INSULIN LISPRO 100/ML
15 VIAL (ML) SUBCUTANEOUS
Qty: 0 | Refills: 0 | DISCHARGE

## 2022-04-04 RX ORDER — HYDRALAZINE HCL 50 MG
25 TABLET ORAL THREE TIMES A DAY
Refills: 0 | Status: DISCONTINUED | OUTPATIENT
Start: 2022-04-04 | End: 2022-04-11

## 2022-04-04 RX ORDER — INSULIN LISPRO 100/ML
VIAL (ML) SUBCUTANEOUS AT BEDTIME
Refills: 0 | Status: DISCONTINUED | OUTPATIENT
Start: 2022-04-04 | End: 2022-04-15

## 2022-04-04 RX ORDER — DEXTROSE 50 % IN WATER 50 %
12.5 SYRINGE (ML) INTRAVENOUS ONCE
Refills: 0 | Status: DISCONTINUED | OUTPATIENT
Start: 2022-04-04 | End: 2022-04-15

## 2022-04-04 RX ORDER — FUROSEMIDE 40 MG
40 TABLET ORAL
Qty: 0 | Refills: 0 | DISCHARGE
Start: 2022-04-04

## 2022-04-04 RX ORDER — CARVEDILOL PHOSPHATE 80 MG/1
6.25 CAPSULE, EXTENDED RELEASE ORAL EVERY 12 HOURS
Refills: 0 | Status: DISCONTINUED | OUTPATIENT
Start: 2022-04-04 | End: 2022-04-15

## 2022-04-04 RX ORDER — DEXTROSE 50 % IN WATER 50 %
15 SYRINGE (ML) INTRAVENOUS ONCE
Refills: 0 | Status: DISCONTINUED | OUTPATIENT
Start: 2022-04-04 | End: 2022-04-15

## 2022-04-04 RX ORDER — LABETALOL HCL 100 MG
10 TABLET ORAL ONCE
Refills: 0 | Status: COMPLETED | OUTPATIENT
Start: 2022-04-04 | End: 2022-04-04

## 2022-04-04 RX ORDER — ATORVASTATIN CALCIUM 80 MG/1
1 TABLET, FILM COATED ORAL
Qty: 0 | Refills: 0 | DISCHARGE
Start: 2022-04-04

## 2022-04-04 RX ORDER — LISINOPRIL 2.5 MG/1
40 TABLET ORAL DAILY
Refills: 0 | Status: DISCONTINUED | OUTPATIENT
Start: 2022-04-05 | End: 2022-04-07

## 2022-04-04 RX ADMIN — INSULIN GLARGINE 40 UNIT(S): 100 INJECTION, SOLUTION SUBCUTANEOUS at 22:40

## 2022-04-04 RX ADMIN — CARVEDILOL PHOSPHATE 6.25 MILLIGRAM(S): 80 CAPSULE, EXTENDED RELEASE ORAL at 05:38

## 2022-04-04 RX ADMIN — Medication 1200 MILLIGRAM(S): at 06:27

## 2022-04-04 RX ADMIN — Medication 10 MILLIGRAM(S): at 14:18

## 2022-04-04 RX ADMIN — HEPARIN SODIUM 5000 UNIT(S): 5000 INJECTION INTRAVENOUS; SUBCUTANEOUS at 22:41

## 2022-04-04 RX ADMIN — Medication 1200 MILLIGRAM(S): at 00:11

## 2022-04-04 RX ADMIN — ATORVASTATIN CALCIUM 40 MILLIGRAM(S): 80 TABLET, FILM COATED ORAL at 22:38

## 2022-04-04 RX ADMIN — SODIUM CHLORIDE 3 MILLILITER(S): 9 INJECTION INTRAMUSCULAR; INTRAVENOUS; SUBCUTANEOUS at 22:54

## 2022-04-04 RX ADMIN — Medication 40 MILLIGRAM(S): at 05:37

## 2022-04-04 RX ADMIN — CARVEDILOL PHOSPHATE 6.25 MILLIGRAM(S): 80 CAPSULE, EXTENDED RELEASE ORAL at 22:39

## 2022-04-04 RX ADMIN — SODIUM CHLORIDE 3 MILLILITER(S): 9 INJECTION INTRAMUSCULAR; INTRAVENOUS; SUBCUTANEOUS at 14:15

## 2022-04-04 RX ADMIN — Medication 81 MILLIGRAM(S): at 22:38

## 2022-04-04 RX ADMIN — Medication 1: at 08:01

## 2022-04-04 NOTE — PROGRESS NOTE ADULT - ASSESSMENT
Pt is a 33 y/o Patient with h/p DM, HTN, presents with SOB and LE edema a/w new onset CHF. Nephrology consulted for Elevated serum creatinine.    1. CKD 3a- likely due to DM2. UA with protein and small blood. Check spot Upr/Cr. Renal f unction stable. c/w mucomyst 1200mg po bid x4 doses (pt received 2 doses prior to cath, c/w 2 doses post cath). Will avoid IVF due to LE edema- recc to hold Lasix prior to cath (already received a dose today). Pt will eventually benefit from ACEi/ARB due to proteinuria and HF, once renal function is stable (post cath).   2. Acute on chronic systolic and diastolic HF- diuresis per cardiology.  Pt for transfer to [facility] for cardiac cath.   3. HTN-  BP acceptable.  Antihypertensive medications per cardiology for treatment of HF and HTN.  4. Lymphedema- Dopplers neg for DVT b/l. Pt on Lasix 40mg IV bid. Consider outpt Vascular f/u.     [facility] NEPHROLOGY  Han Choudhury M.D.  Mekhi Verdin D.O.  Camille Brown M.D.  Lorena Wesley, ROLLY, ANP-C    Telephone: [phone]  Facsimile: [phone]    [id number] [id number]  J
31 y/o Patient with PMH of HTN, HLD, Varicose veins presents to the ED for SOB and cough.  Admitted for SOB.  Feeling better today, no complaints.  Case discussed with Dr. Pinto
CARDIOLOGY ATTENDING    Agree with above. Echo showed moderate LV dysfunction, which given his young age is likely secondary to uncontrolled hypertension. Regardless would recommend cath on Monday at [facility]. Make NPO after midnight sunday night, hold lovenox, and transfer to [facility] for cath on Monday.
CARDIOLOGY ATTENDING    Agree with above. Echo showed moderate LV dysfunction, which given his young age is likely secondary to uncontrolled hypertension. Regardless would recommend cath on Monday at [facility]. Make NPO after midnight tonight, hold lovenox, and transfer to [facility] for cath on Monday. Will start coreg and losartan now for treatment of LV dysfunction.

## 2022-04-04 NOTE — PROGRESS NOTE ADULT - SUBJECTIVE AND OBJECTIVE BOX
C A R D I O L O G Y  **********************************     DATE OF SERVICE: 04-04-22    Patient denies chest pain or shortness of breath.   Review of systems otherwise (-)  	  MEDICATIONS:  MEDICATIONS  (STANDING):  acetylcysteine  Oral Solution 1200 milliGRAM(s) Oral every 12 hours  aspirin enteric coated 81 milliGRAM(s) Oral daily  atorvastatin 40 milliGRAM(s) Oral at bedtime  carvedilol 6.25 milliGRAM(s) Oral every 12 hours  furosemide   Injectable 40 milliGRAM(s) IV Push two times a day  insulin glargine Injectable (LANTUS) 15 Unit(s) SubCutaneous at bedtime  insulin lispro (ADMELOG) corrective regimen sliding scale   SubCutaneous three times a day before meals      LABS:	 	    CARDIAC MARKERS:        Troponin I, High Sensitivity Result: 63.7 ng/L (04-02-22 @ 10:40)                              9.6    5.91  )-----------( 244      ( 04 Apr 2022 06:15 )             27.5     Hemoglobin: 9.6 g/dL (04-04 @ 06:15)  Hemoglobin: 10.2 g/dL (04-03 @ 07:45)  Hemoglobin: 10.5 g/dL (04-02 @ 10:40)  Hemoglobin: 10.9 g/dL (04-01 @ 07:43)  Hemoglobin: 11.7 g/dL (03-31 @ 13:37)      04-04    137  |  105  |  32<H>  ----------------------------<  178<H>  4.3   |  28  |  1.64<H>    Ca    8.4      04 Apr 2022 06:15  Phos  4.7     04-04  Mg     2.0     04-04    TPro  5.4<L>  /  Alb  2.0<L>  /  TBili  1.4<H>  /  DBili  x   /  AST  27  /  ALT  30  /  AlkPhos  58  04-04    Creatinine Trend: 1.64<--, 1.64<--, 1.63<--, 1.65<--, 1.83<--    COAGS:       proBNP:   Lipid Profile:   HgA1c:   TSH:       PHYSICAL EXAM:  T(C): 36.7 (04-04-22 @ 08:37), Max: 36.8 (04-03-22 @ 14:10)  HR: 88 (04-04-22 @ 08:37) (88 - 98)  BP: 136/74 (04-04-22 @ 08:37) (136/74 - 174/86)  RR: 18 (04-04-22 @ 08:37) (16 - 18)  SpO2: 100% (04-04-22 @ 08:37) (97% - 100%)  Wt(kg): --  I&O's Summary    03 Apr 2022 07:01  -  04 Apr 2022 07:00  --------------------------------------------------------  IN: 0 mL / OUT: 500 mL / NET: -500 mL          Gen: Appears well in NAD  HEENT:  (-)icterus (-)pallor  CV: N S1 S2 1/6 CAMRON (+)2 Pulses B/l  Resp:  Clear to ausculatation B/L, normal effort  GI: (+) BS Soft, NT, ND  Lymph:  (+)Edema, (-)obvious lymphadenopathy  Skin: Warm to touch, Normal turgor  Psych: Appropriate mood and affect      TELEMETRY: 	  Sinus         ASSESSMENT/PLAN: 	32y  Male  PMH of HTN, HLD, Varicose veins presents to the ED for SOB and cough positive trop elevated BNP.    - Elevated Ddimmer, VQ scan (-) lower extremity dopplers (-)   - Echo with moderate LV systolic dysfunction keep net negative  - cont Coreg  - Eventual ARB after cath if renal fx allows   - Renal f/f appreciated  - I suspect this is sequale of hypertension.  He will need an oupt sleep study  - Oupt cardiac f/u [phone]    Zion Evangelista MD, [facility]  BEEPER [phone]       C A R D I O L O G Y  **********************************     DATE OF SERVICE: 04-04-22    Patient denies chest pain or shortness of breath.   Review of systems otherwise (-)  	  MEDICATIONS:  MEDICATIONS  (STANDING):  acetylcysteine  Oral Solution 1200 milliGRAM(s) Oral every 12 hours  aspirin enteric coated 81 milliGRAM(s) Oral daily  atorvastatin 40 milliGRAM(s) Oral at bedtime  carvedilol 6.25 milliGRAM(s) Oral every 12 hours  furosemide   Injectable 40 milliGRAM(s) IV Push two times a day  insulin glargine Injectable (LANTUS) 15 Unit(s) SubCutaneous at bedtime  insulin lispro (ADMELOG) corrective regimen sliding scale   SubCutaneous three times a day before meals      LABS:	 	    CARDIAC MARKERS:        Troponin I, High Sensitivity Result: 63.7 ng/L (04-02-22 @ 10:40)                              9.6    5.91  )-----------( 244      ( 04 Apr 2022 06:15 )             27.5     Hemoglobin: 9.6 g/dL (04-04 @ 06:15)  Hemoglobin: 10.2 g/dL (04-03 @ 07:45)  Hemoglobin: 10.5 g/dL (04-02 @ 10:40)  Hemoglobin: 10.9 g/dL (04-01 @ 07:43)  Hemoglobin: 11.7 g/dL (03-31 @ 13:37)      04-04    137  |  105  |  32<H>  ----------------------------<  178<H>  4.3   |  28  |  1.64<H>    Ca    8.4      04 Apr 2022 06:15  Phos  4.7     04-04  Mg     2.0     04-04    TPro  5.4<L>  /  Alb  2.0<L>  /  TBili  1.4<H>  /  DBili  x   /  AST  27  /  ALT  30  /  AlkPhos  58  04-04    Creatinine Trend: 1.64<--, 1.64<--, 1.63<--, 1.65<--, 1.83<--    COAGS:       proBNP:   Lipid Profile:   HgA1c:   TSH:       PHYSICAL EXAM:  T(C): 36.7 (04-04-22 @ 08:37), Max: 36.8 (04-03-22 @ 14:10)  HR: 88 (04-04-22 @ 08:37) (88 - 98)  BP: 136/74 (04-04-22 @ 08:37) (136/74 - 174/86)  RR: 18 (04-04-22 @ 08:37) (16 - 18)  SpO2: 100% (04-04-22 @ 08:37) (97% - 100%)  Wt(kg): --  I&O's Summary    03 Apr 2022 07:01  -  04 Apr 2022 07:00  --------------------------------------------------------  IN: 0 mL / OUT: 500 mL / NET: -500 mL          Gen: Appears well in NAD  HEENT:  (-)icterus (-)pallor  CV: N S1 S2 1/6 CAMRON (+)2 Pulses B/l  Resp:  Clear to ausculatation B/L, normal effort  GI: (+) BS Soft, NT, ND  Lymph:  (+)Edema, (-)obvious lymphadenopathy  Skin: Warm to touch, Normal turgor  Psych: Appropriate mood and affect      TELEMETRY: 	  Sinus         ASSESSMENT/PLAN: 	32y  Male  PMH of HTN, HLD, Varicose veins presents to the ED for SOB and cough positive trop elevated BNP found with acute systolic heart failure.    - Elevated Ddimmer, VQ scan (-) lower extremity dopplers (-)   - Echo with moderate LV systolic dysfunction keep net negative  - cont Coreg  - Eventual ARB after cath if renal fx allows   - Renal f/f appreciated  - I suspect this is sequale of hypertension.  He will need an oupt sleep study  - Oupt cardiac f/u [phone]    Zion Evangelista MD, [facility]  BEEPER [phone]

## 2022-04-04 NOTE — PROGRESS NOTE ADULT - SUBJECTIVE AND OBJECTIVE BOX
[facility] NEPHROLOGY- PROGRESS NOTE    Pt is a 31 y/o Patient with h/p DM, HTN ,presents with SOB and LE edema a/w new onset CHF. Nephrology consulted for Elevated serum creatinine.  Pt for transfer to [facility] for cardiac cath    Hospital Medications: Medications reviewed.  REVIEW OF SYSTEMS:  CONSTITUTIONAL: No fevers or chills  RESPIRATORY: No shortness of breath  CARDIOVASCULAR: No chest pain.  GASTROINTESTINAL: No nausea, vomiting, diarrhea or abdominal pain.   VASCULAR: + bilateral lower extremity edema.     VITALS:  T(F): 98 (22 @ 08:37), Max: 98.3 (22 @ 20:44)  HR: 88 (22 @ 08:37)  BP: 136/74 (22 @ 08:37)  RR: 18 (22 @ 08:37)  SpO2: 100% (22 @ 08:37)  Wt(kg): --  Height (cm): 185.4 ( @ 21:42)  Weight (kg): 171 ( @ 21:42)  BMI (kg/m2): 49.7 ( @ 21:42)  BSA (m2): 2.82 ( @ 21:42)    04-03 @ 07:01  -  -04 @ 07:00  --------------------------------------------------------  IN: 0 mL / OUT: 500 mL / NET: -500 mL      PHYSICAL EXAM:  Constitutional: NAD  Neurological: Awake and Alert  HEENT: anicteric sclera,   Respiratory: CTAB, no rales  Cardiovascular: S1, S2, RRR  Gastrointestinal: BS+, soft, NT/ND  : No qureshi.  Extremities: +lymphedema; non pitting b/l     LABS:      137  |  105  |  32<H>  ----------------------------<  178<H>  4.3   |  28  |  1.64<H>    Ca    8.4      2022 06:15  Phos  4.7     04-04  Mg     2.0     04-    TPro  5.4<L>  /  Alb  2.0<L>  /  TBili  1.4<H>  /  DBili      /  AST  27  /  ALT  30  /  AlkPhos  58      Creatinine Trend: 1.64 <--, 1.64 <--, 1.63 <--, 1.65 <--, 1.83 <--                        9.6    5.91  )-----------( 244      ( 2022 06:15 )             27.5     Urine Studies:  Urinalysis Basic - ( 2022 22:34 )    Color: Yellow / Appearance: Clear / S.015 / pH:   Gluc:  / Ketone: Negative  / Bili: Negative / Urobili: Negative   Blood:  / Protein: 100 / Nitrite: Negative   Leuk Esterase: Negative / RBC: 5-10 /HPF / WBC 0-2 /HPF   Sq Epi:  / Non Sq Epi: Occasional /HPF / Bacteria: Trace /HPF      Sodium, Random Urine: 95 mmol/L ( @ 22:33)  Creatinine, Random Urine: 62 mg/dL ( @ 22:33)    RADIOLOGY & ADDITIONAL STUDIES:

## 2022-04-04 NOTE — PROGRESS NOTE ADULT - PROVIDER SPECIALTY LIST ADULT
Cardiology
Internal Medicine
Nephrology
Cardiology
Cardiology
Internal Medicine

## 2022-04-04 NOTE — CONSULT NOTE ADULT - SUBJECTIVE AND OBJECTIVE BOX
HPI:  32 year old male with Autism (without limitations of functionality and intellect; able to work and sign his own consent per mother who is at bedside)HTN, diabetes mellitus type 2, lymphedema since age 12 that was recently diagnosed who presented to [facility] ED 3/31/22 complaining of SOB and cough x1week. Patient states that he has not taken his Lantus 35units suc bedtime/Lispro 15unites with meals or Lisinopril 40mg po daily as he ran out of refills and has been waiting for an appointment.  He has noted increased abdominal weight with tighter pants along with SOB walking (can not quantify).  Patient states he was about to take off on a flight to [place] when he started having SOB and a cough with productive of clear sputum while on the airplane [place] the [place].  Flight crew activated EMS and patient was BIBA to [facility]. Patient R/O MI with negative troponins. Labs were remarkable for Cr 1.83 and BNP was 2900 for which he was started on IV Lasix diuresis for acute systolic and diastolic CHF and Ddimer 335 with unremarkable LE dopplers and V/Q scan with very low probability of PE. Denies chest pain, fever, chills, abd pain, N/V/D.   Renal consulted for elevated Cr, likely due to DM2. Started on Mucomyst 1200mg po bid x4 doses in anticipation for cath.  Underwent an transthoracic Echo revealing EF 40-45%, mod global LV systolic dysfunction, mod concentric LV hypertrophy, sev dil LA, no sig valvular disease.   In light of patients cardiac risk factors, symptoms and abnormal noninvasive test findings the patient is now transferred to [facility] 4/4/22 for a cardiac catheterization to evaluate for ischemic etiology of CHF.   MEDS at [facility]: Coreg 3.125mg po BID, ASA 81mg po daily, Lipitor 40mg po daily, lisinopril 40mg po daily, Lantus 40units subcutaneous bedtime, lasix 40mg IV BID, Lispro 15units subc TID meals    Denies fever, cough, chills, headache, flu like symptoms, sick contact or recent travel  COVID PCR not detected on 4/3/22    UA with protein and small blood. Check spot Upr/Cr3  Pt will eventually benefit from ACEi/ARB due to proteinuria and HF, once renal function is stable (post cath).    (04 Apr 2022 14:13)      PAST MEDICAL & SURGICAL HISTORY:  Diabetes mellitus    Hypertension    Acute combined systolic and diastolic congestive heart failure    Morbidly obese    Lymphedema    Autism  without limitations of functionality and intellect; able to work and sign his own consent per mother who is at bedside    No significant past surgical history      ALLERGIES:  Keflex (Hives)      Social Hx: NONSMOKER    FAMILY HISTORY:  Family history of stent (Mother)  mother with coronary stent 51yo        acetylcysteine  Oral Solution 1200 milliGRAM(s) Oral every 12 hours  aspirin enteric coated 81 milliGRAM(s) Oral daily  atorvastatin 40 milliGRAM(s) Oral at bedtime  carvedilol 6.25 milliGRAM(s) Oral every 12 hours  dextrose 5%. 1000 milliLiter(s) IV Continuous <Continuous>  dextrose 5%. 1000 milliLiter(s) IV Continuous <Continuous>  dextrose 50% Injectable 25 Gram(s) IV Push once  dextrose 50% Injectable 12.5 Gram(s) IV Push once  dextrose 50% Injectable 25 Gram(s) IV Push once  dextrose Oral Gel 15 Gram(s) Oral once PRN  glucagon  Injectable 1 milliGRAM(s) IntraMuscular once  heparin   Injectable 5000 Unit(s) SubCutaneous every 12 hours  hydrALAZINE 25 milliGRAM(s) Oral three times a day  insulin glargine Injectable (LANTUS) 40 Unit(s) SubCutaneous at bedtime  insulin lispro (ADMELOG) corrective regimen sliding scale   SubCutaneous three times a day before meals  insulin lispro (ADMELOG) corrective regimen sliding scale   SubCutaneous at bedtime  insulin lispro Injectable (ADMELOG) 15 Unit(s) SubCutaneous three times a day before meals  sodium chloride 0.9% lock flush 3 milliLiter(s) IV Push every 8 hours      MEDICATIONS  (PRN):  dextrose Oral Gel 15 Gram(s) Oral once PRN Blood Glucose LESS THAN 70 milliGRAM(s)/deciliter      T(C): 36.9 (04-04-22 @ 21:00), Max: 37 (04-04-22 @ 11:28)  HR: 102 (04-04-22 @ 21:00) (88 - 102)  BP: 135/86 (04-04-22 @ 21:00) (135/86 - 174/86)  RR: 18 (04-04-22 @ 21:00) (17 - 18)  SpO2: 100% (04-04-22 @ 21:00) (97% - 100%)        REVIEW OF SYSTEMS:                           ALL ROS DONE [ X   ]    CONSTITUTIONAL:  LETHARGIC [   ], FEVER [   ], UNRESPONSIVE [   ]  CVS:  CP  [   ], SOB, [   ], PALPITATIONS [   ], DIZZYNESS [   ]  RS: COUGH [   ], SPUTUM [   ]  GI: ABDOMINAL PAIN [   ], NAUSEA [   ], VOMITINGS [   ], DIARRHEA [   ], CONSTIPATION [   ]  :  DYSURIA [   ], NOCTURIA [   ], INCREASED FREQUENCY [   ], DRIBLING [   ],  SKELETAL: PAINFUL JOINTS [   ], SWOLLEN JOINTS [   ], NECK ACHE [   ], LOW BACK ACHE [   ],  SKIN : ULCERS [   ], RASH [   ], ITCHING [   ]  CNS: HEAD ACHE [   ], DOUBLE VISION [   ], BLURRED VISION [   ], AMS / CONFUSION [   ], SEIZURES [   ], WEAKNESS [   ],TINGLING / NUMBNESS [   ]    PHYSICAL EXAMINATION:  GENERAL APPEARANCE: NO DISTRESS  HEENT:  NO PALLOR, NO  JVD,  NO   NODES, NECK SUPPLE  CVS: S1 +, S2 +,   RS: AEEB,  OCCASIONAL  RALES +,   NO RONCHI  ABD: SOFT, NT, NO, BS +  EXT: PE ++    LEFT FOREARM W/ ? SMALL WOUND DISTAL TO ANTECUBITAL FOSSA [UNABLE TO EXPRESS DISCHARGE ]  SKIN: WARM,   SKELETAL:  ROM ACCEPTABLE  CNS:  AAO X 3    RADIOLOGY :    ACC: [id number] EXAM:  US DPLX LWR EXT VEINS COMPL BI                          PROCEDURE DATE:  04/01/2022          INTERPRETATION:  CLINICAL INFORMATION: Shortness of breath. Diabetes.    COMPARISON: None available.    TECHNIQUE: Duplex sonography of the BILATERAL LOWER extremity veins with   color and spectral Doppler, with and without compression. The examination   is technically limited secondary to subcutaneous edema in the calves and   body habitus.    FINDINGS:    RIGHT:  Normal compressibility of the RIGHT common femoral, femoral and popliteal   veins.  Doppler examination shows normal spontaneous and phasic flow.  Calf veins not seen.    Subcutaneous edema in the calf.    LEFT:  Normal compressibility of the LEFT common femoral, femoral and popliteal   veins.  Doppler examination shows normal spontaneous and phasic flow.  Calf veins not seen.    Subcutaneous edema in the calf.    IMPRESSION:    Technically limited study including nonvisualization of the calf veins   with no evidence of deep venous thrombosis in the visualized bilateral   lower extremity veins.    =========================    ACC: [id number] EXAM:  NM PULM VENTILATION PERFUS IMG                          PROCEDURE DATE:  04/01/2022          INTERPRETATION:  CLINICAL INFORMATION: 32 year old male with dyspnea,   elevated serum creatinine level; referred to evaluate for pulmonary   embolism.    RADIOPHARMACEUTICAL: 1.0 mCi Tc-99m-DTPA via inhalation; 6.2 mCi   Tc-99m-MAA, I.V.    TECHNIQUE:  Ventilation and perfusion images of the lungs were obtained   following administration of Tc-99m-DTPA and Tc-99m-MAA. Images were   obtained in the anterior, posterior, both lateral, and all 4 oblique   projections. The study was interpreted in conjunction with a chest   radiograph of 3/31/2022.    COMPARISON: No prior lung V/Q scan.    FINDINGS: There is heterogeneous distribution of radiopharmaceutical in   the lungs on the ventilation and perfusion images. There are no segmental   perfusion defects.    IMPRESSION: Very low probability of pulmonary embolus.      ASSESSMENT :       PLAN:  HPI:  32 year old male with Autism (without limitations of functionality and intellect; able to work and sign his own consent per mother who is at bedside)HTN, diabetes mellitus type 2, lymphedema since age 12 that was recently diagnosed who presented to [facility] ED 3/31/22 complaining of SOB and cough x1week. Patient states that he has not taken his Lantus 35units suc bedtime/Lispro 15unites with meals or Lisinopril 40mg po daily as he ran out of refills and has been waiting for an appointment.  He has noted increased abdominal weight with tighter pants along with SOB walking (can not quantify).  Patient states he was about to take off on a flight to [place] when he started having SOB and a cough with productive of clear sputum while on the airplane [place] the [place].  Flight crew activated EMS and patient was BIBA to [facility]. Patient R/O MI with negative troponins. Labs were remarkable for Cr 1.83 and BNP was 2900 for which he was started on IV Lasix diuresis for acute systolic and diastolic CHF and Ddimer 335 with unremarkable LE dopplers and V/Q scan with very low probability of PE. Denies chest pain, fever, chills, abd pain, N/V/D.   Renal consulted for elevated Cr, likely due to DM2. Started on Mucomyst 1200mg po bid x4 doses in anticipation for cath.  Underwent an transthoracic Echo revealing EF 40-45%, mod global LV systolic dysfunction, mod concentric LV hypertrophy, sev dil LA, no sig valvular disease.   In light of patients cardiac risk factors, symptoms and abnormal noninvasive test findings the patient is now transferred to [facility] 4/4/22 for a cardiac catheterization to evaluate for ischemic etiology of CHF.   MEDS at [facility]: Coreg 3.125mg po BID, ASA 81mg po daily, Lipitor 40mg po daily, lisinopril 40mg po daily, Lantus 40units subcutaneous bedtime, lasix 40mg IV BID, Lispro 15units subc TID meals    Denies fever, cough, chills, headache, flu like symptoms, sick contact or recent travel  COVID PCR not detected on 4/3/22    UA with protein and small blood. Check spot Upr/Cr3  Pt will eventually benefit from ACEi/ARB due to proteinuria and HF, once renal function is stable (post cath).    (04 Apr 2022 14:13)    # CASE D/W MOTHER, ALBERTO SIMMONS @ BEDSIDE[4/4] - ALL QUESTIONS ANSWERED    # ? LEFT FOREARM PHLEBITIS   - F/U BCX, MONITOR SITE  - WARM COMPRESSES, ARM ELEVATION  - ID CONSULT IN PROGRESS    # SUSPECT NEW ONSET CHF - SYSTOLIC AND DIASTOLIC DYSFUNCTION  # HYPERTENSIVE URGENCY, HTN  # B/L LE EDEMA - SUSPECT S/T CHF AND ?VARICOSE VEINS  - MONITOR ON TELEMETRY, TRENDED TROPONINS  - LASIX, STATIN, COREG  - REVIEWED TSH/LIPID PANEL/HBA1C  - CARDIOLOGY CONSULT    - ECHO - MILD MR, DILATED LA, CONCENTRIC LVH, GLOBAL LV SYSTOLIC DYSFUNCTION, G1DD  - TRANSFERRED TO [facility] FOR CARDIAC CATH    # ELEVATED D-DIMER  - V/Q SCAN - LOW PROBABILITY PE  - DOPPLER LE - NEGATIVE FOR DVT    # SUSANA VS. CKD - REVIEWED UA, F/U BLADDER SCAN, MONITOR CR, AVOID NEPHROTOXIC AGENTS  - NEPHROLOGY CONSULT IN PROGRESS    # SUSPECT CAMERON  - OUTPATIENT SLEEP STUDY    # MORBID OBESITY   - NUTRITION CONSULT    # DM2, NONCOMPLIANT W/ INSULIN - HBA1C 9.4 - LANTUS, SSI + FS    # HLD - STARTED STATIN, REVIEWED LIPID PANEL    # GI AND DVT PPX

## 2022-04-04 NOTE — CONSULT NOTE ADULT - SUBJECTIVE AND OBJECTIVE BOX
Patient is a 32y old  Male who presents with a chief complaint of shortness of breath (2022 14:13)    HPI:  32M with Autism, HTN, poorly controlled DM (A1c=9.4), lymphedema since age 12.  Developed SOB/cough just before take off on a flight to [place].  He has been non-adherent to his medications and was noted to have hypertension.  ALso, noted increased weight.  HE was brought to [facility].  Eventually, he was transferred to [facility] for cardiac cath.  HE was to undergo cath this afternoon, however, it was noted that he had phlebitis with purulent discharge from the exit site.  No fever/chills.  PIV was removed.  Denies pain at the site.     Labs notable for Cr 1.83 and BNP was 2900.  Work up for DVT/PE was negative.      ID asked to help management.    prior hospital charts reviewed [  x]  primary team notes reviewed [  x]  other consultant notes reviewed [x  ]    PAST MEDICAL & SURGICAL HISTORY:  Diabetes mellitus (A1c=9.4)  Hypertension  Acute combined systolic and diastolic congestive heart failure  Morbidly obese  Lymphedema  Autism (per chart w/o limitations of functionality and intellect; able to work and sign his own consent per chart)    Allergies  Keflex (Hives)    ANTIMICROBIALS:  none    MEDICATIONS  (STANDING):  aspirin enteric coated 81 daily  atorvastatin 40 at bedtime  carvedilol 6.25 every 12 hours  heparin   Injectable 5000 every 12 hours  hydrALAZINE 25 three times a day  insulin glargine Injectable (LANTUS) 40 at bedtime  insulin lispro (ADMELOG) corrective regimen sliding scale  three times a day before meals  insulin lispro (ADMELOG) corrective regimen sliding scale  at bedtime  insulin lispro Injectable (ADMELOG) 15 three times a day before meals    SOCIAL HISTORY:   has a pet cat; 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, 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    FAMILY HISTORY:  Family history of stent (Mother)  mother with coronary stent 49yo    REVIEW OF SYSTEMS  [  ] ROS unobtainable because:    [  ] All other systems negative except as noted below:	    Constitutional:  [ ] fever [ ] chills  [ ] weight loss  [ ] weakness  Skin:  [ ] rash [ ] phlebitis	  Eyes: [ ] icterus [ ] pain  [ ] discharge	  ENMT: [ ] sore throat  [ ] thrush [ ] ulcers [ ] exudates  Respiratory: [ ] dyspnea [ ] hemoptysis [ ] cough [ ] sputum	  Cardiovascular:  [ ] chest pain [ ] palpitations [ ] edema	  Gastrointestinal:  [ ] nausea [ ] vomiting [ ] diarrhea [ ] constipation [ ] pain	  Genitourinary:  [ ] dysuria [ ] frequency [ ] hematuria [ ] discharge [ ] flank pain  [ ] incontinence  Musculoskeletal:  [ ] myalgias [ ] arthralgias [ ] arthritis  [ ] back pain  Neurological:  [ ] headache [ ] seizures  [ ] confusion/altered mental status  Psychiatric:  [ ] anxiety [ ] depression	  Hematology/Lymphatics:  [ ] lymphadenopathy  Endocrine:  [ ] adrenal [ ] thyroid  Allergic/Immunologic:	 [ ] transplant [ ] seasonal    Vital Signs Last 24 Hrs  T(F): 98.6 (22 @ 11:28), Max: 98.6 (22 @ 05:05)  Vital Signs Last 24 Hrs  HR: 92 (22 @ 11:28) (88 - 98)  BP: 157/94 (22 @ 11:28) (136/74 - 174/86)  RR: 17 (22 @ 11:28)  SpO2: 100% (22 @ 11:28) (97% - 100%)  Wt(kg): --    PHYSICAL EXAM:  Constitutional: non-toxic, no distress  HEAD/EYES: anicteric  ENT:  supple  Cardiovascular:   normal S1, S2, no murmur  Respiratory:  clear BS bilaterally  GI:  soft, non-tender, normal bowel sounds  :  no qureshi  Musculoskeletal:  no synovitis  Neurologic: awake and alert, normal strength, no focal findings  Skin:  left antecubital fossa with evidence of phlebitis, not tender, no drainage, however, it was noted he had purulent drainage earlier  Psychiatric:  awake, alert, appropriate mood                       9.6    5.91  )-----------( 244      ( 2022 06:15 )             27.5     137  |  105  |  32<H>  ----------------------------<  178<H>  4.3   |  28  |  1.64<H>    Ca    8.4      2022 06:15  Phos  4.7     04-04  Mg     2.0     04-04    TPro  5.4<L>  /  Alb  2.0<L>  /  TBili  1.4<H>  /  DBili  x   /  AST  27  /  ALT  30  /  AlkPhos  58  04-    Urinalysis Basic - ( 2022 22:34 )  Color: Yellow / Appearance: Clear / S.015 / pH: x  Gluc: x / Ketone: Negative  / Bili: Negative / Urobili: Negative   Blood: x / Protein: 100 / Nitrite: Negative   Leuk Esterase: Negative / RBC: 5-10 /HPF / WBC 0-2 /HPF   Sq Epi: x / Non Sq Epi: Occasional /HPF / Bacteria: Trace /HPF    MICROBIOLOGY:  Rapid RVP Result: NotDetec ( @ 13:37)    RADIOLOGY:  imaging below personally reviewed and agree with findings    US Duplex Venous Lower Ext Complete, Bilateral (22 @ 11:24) >  IMPRESSION:  Technically limited study including nonvisualization of the calf veins with no evidence of deep venous thrombosis in the visualized bilateral lower extremity veins.    NM Pulmonary Ventilation/Perfusion Scan (22 @ 10:10) >  IMPRESSION: Very low probability of pulmonary embolus.    Xray Chest 1 View- PORTABLE-Urgent (22 @ 13:22) >  IMPRESSION: No definite infiltrate.    Transthoracic Echocardiogram (22 @ 07:17) >  Ejection Fraction Visual Estimate: 40-45 %  CONCLUSIONS:  1. Normal mitral valve. Mild mitral regurgitation.  2. Normal trileaflet aortic valve.  3. Aortic Root: 3.7 cm.  4. Severely dilated left atrium.  LA volume index = 66 cc/m2.  5. Moderate concentric left ventricular hypertrophy.  6. Moderate global left ventricular systolic dysfunction.  7. Grade I diastolic dysfunction (Impaired relaxation, mild).  8. Normal right atrium.  9. Normal right ventricular size and systolic function (TAPSE  1.9cm).  10. Unable to estimate RVSP.  11. There is trace tricuspid regurgitation.  12. There is trace pulmonic regurgitation.  13. Small pericardial effusion.

## 2022-04-04 NOTE — TRANSFER ACCEPTANCE NOTE - HISTORY OF PRESENT ILLNESS
32 year old male with Autism (without limitations of functionality and intellect; able to work and sign his own consent per mother who is at bedside)HTN, diabetes mellitus type 2, lymphedema since age 12 that was recently diagnosed who presented to [facility] ED 3/31/22 complaining of SOB and cough x1week. Patient states that he has not taken his Lantus 35units suc bedtime/Lispro 15unites with meals or Lisinopril 40mg po daily as he ran out of refills and has been waiting for an appointment.  He has noted increased abdominal weight with tighter pants along with SOB walking (can not quantify).  Patient states he was about to take off on a flight to [place] when he started having SOB and a cough with productive of clear sputum while on the airplane on the [employer].  Flight crew activated EMS and patient was BIBA to [facility]. Patient R/O MI with negative troponins. Labs were remarkable for Cr 1.83 and BNP was 2900 for which he was started on IV Lasix diuresis for acute systolic and diastolic CHF and Ddimer 335 with unremarkable LE dopplers and V/Q scan with very low probability of PE. Denies chest pain, fever, chills, abd pain, N/V/D.   Renal consulted for elevated Cr, likely due to DM2. Started on Mucomyst 1200mg po bid x4 doses in anticipation for cath.  Underwent an transthoracic Echo revealing EF 40-45%, mod global LV systolic dysfunction, mod concentric LV hypertrophy, sev dil LA, no sig valvular disease.   In light of patients cardiac risk factors, symptoms and abnormal noninvasive test findings the patient is now transferred to [facility] 4/4/22 for a cardiac catheterization to evaluate for ischemic etiology of CHF.   MEDS at [facility]: Coreg 3.125mg po BID, ASA 81mg po daily, Lipitor 40mg po daily, lisinopril 40mg po daily, Lantus 40units subcutaneous bedtime, lasix 40mg IV BID, Lispro 15units subc TID meals    Denies fever, cough, chills, headache, flu like symptoms, sick contact or recent travel  COVID PCR not detected on 4/3/22    UA with protein and small blood. Check spot Upr/Cr3  Pt will eventually benefit from ACEi/ARB due to proteinuria and HF, once renal function is stable (post cath).

## 2022-04-04 NOTE — TRANSFER ACCEPTANCE NOTE - EXTREMITIES COMMENTS
bilateral lower extremities with nonpitting lymphedema swelling with large legs from thighs to ankles with no skin breakdown, no ulcers

## 2022-04-04 NOTE — PATIENT PROFILE ADULT - NSPROPOAPRESSUREINJURY_GEN_A_NUR
Radha Montgomery was admitted to [facility] from clinic via wheelchair accompanied by family.   Reason for hospitalization is [facility] hospice planning.   Upon arrival, patient is stable. Patient has history significant for Metastatic lung cancer.  Patient oriented to bed, call light, , room and unit.  Patient provided with the following educational materials upon admission:safety, advanced directives, infection control and pain.   Level of understanding patient verbalized understanding.   Admission orders received at this time.   Dr. Howard notified of patient arrival.   See Epic documentation for patient individualized nursing care plan. Dual skin assessment completed with Radha JULIO. State DNR bracelet already on patient. Patient and family refused accessing port.     no

## 2022-04-04 NOTE — CONSULT NOTE ADULT - ASSESSMENT
32M with Autism, HTN, poorly controlled DM (A1c=9.4), lymphedema since age 12.  Developed SOB/cough just before take off on a flight to [place].  He has been non-adherent to his medications and was noted to have hypertension.  ALso, noted increased weight.  HE was brought to [facility].  Eventually, he was transferred to [facility] for cardiac cath.  HE was to undergo cath this afternoon, however, it was noted that he had phlebitis with purulent discharge from the exit site.     Phlebitis  - would obtain blood cultures x2  - arm elevation  - warm compresses  - patient is allergic to keflex    32M with Autism, HTN, poorly controlled DM (A1c=9.4), lymphedema since age 12.  Developed SOB/cough just before take off on a flight to [place].  He has been non-adherent to his medications and was noted to have hypertension.  ALso, noted increased weight.  HE was brought to [facility].  Eventually, he was transferred to [facility] for cardiac cath.  HE was to undergo cath this afternoon, however, it was noted that he had phlebitis with purulent discharge from the exit site.     Phlebitis  - would obtain blood cultures x2  - arm elevation  - warm compresses  - patient is allergic to keflex  - will monitor off antibiotics for now and f/u BC given no fever, no leukocytosis, no tenderness    I have discussed plan of care as detailed above with cardiologist

## 2022-04-04 NOTE — TRANSFER ACCEPTANCE NOTE - NSICDXPASTMEDICALHX_GEN_ALL_CORE_FT
PAST MEDICAL HISTORY:  Acute combined systolic and diastolic congestive heart failure     Autism without limitations of functionality and intellect; able to work and sign his own consent per mother who is at bedside    Diabetes mellitus     Hypertension     Lymphedema     Morbidly obese

## 2022-04-04 NOTE — CONSULT NOTE ADULT - SUBJECTIVE AND OBJECTIVE BOX
Patient is a 32y old  Male who presents with a chief complaint of shortness of breath (2022 14:13)    HPI:  32M with Autism, HTN, poorly controlled DM (A1c=9.4), lymphedema since age 12.  Developed SOB/cough just before take off on a flight to [place].  He has been non-adherent to his medications and was noted to have hypertension.  ALso, noted increased weight.  HE was brought to [facility].  Eventually, he was transferred to [facility] for cardiac cath.  HE was to undergo cath this afternoon, however, it was noted that he had phlebitis with purulent discharge from the exit site.  No fever/chills.  PIV was removed.  Denies pain at the site.     Labs notable for Cr 1.83 and BNP was 2900.  Work up for DVT/PE was negative.      ID asked to help management.    prior hospital charts reviewed [  x]  primary team notes reviewed [  x]  other consultant notes reviewed [x  ]    PAST MEDICAL & SURGICAL HISTORY:  Diabetes mellitus (A1c=9.4)  Hypertension  Acute combined systolic and diastolic congestive heart failure  Morbidly obese  Lymphedema  Autism (per chart w/o limitations of functionality and intellect; able to work and sign his own consent per chart)    Allergies  Keflex (Hives)    ANTIMICROBIALS:  none    MEDICATIONS  (STANDING):  aspirin enteric coated 81 daily  atorvastatin 40 at bedtime  carvedilol 6.25 every 12 hours  heparin   Injectable 5000 every 12 hours  hydrALAZINE 25 three times a day  insulin glargine Injectable (LANTUS) 40 at bedtime  insulin lispro (ADMELOG) corrective regimen sliding scale  three times a day before meals  insulin lispro (ADMELOG) corrective regimen sliding scale  at bedtime  insulin lispro Injectable (ADMELOG) 15 three times a day before meals    SOCIAL HISTORY:   has a pet cat; 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, 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    FAMILY HISTORY:  Family history of stent (Mother)  mother with coronary stent 49yo    REVIEW OF SYSTEMS  [  ] ROS unobtainable because:    [  ] All other systems negative except as noted below:	    Constitutional:  [ ] fever [ ] chills  [ ] weight loss  [ ] weakness  Skin:  [ ] rash [ ] phlebitis	  Eyes: [ ] icterus [ ] pain  [ ] discharge	  ENMT: [ ] sore throat  [ ] thrush [ ] ulcers [ ] exudates  Respiratory: [ ] dyspnea [ ] hemoptysis [ ] cough [ ] sputum	  Cardiovascular:  [ ] chest pain [ ] palpitations [ ] edema	  Gastrointestinal:  [ ] nausea [ ] vomiting [ ] diarrhea [ ] constipation [ ] pain	  Genitourinary:  [ ] dysuria [ ] frequency [ ] hematuria [ ] discharge [ ] flank pain  [ ] incontinence  Musculoskeletal:  [ ] myalgias [ ] arthralgias [ ] arthritis  [ ] back pain  Neurological:  [ ] headache [ ] seizures  [ ] confusion/altered mental status  Psychiatric:  [ ] anxiety [ ] depression	  Hematology/Lymphatics:  [ ] lymphadenopathy  Endocrine:  [ ] adrenal [ ] thyroid  Allergic/Immunologic:	 [ ] transplant [ ] seasonal    Vital Signs Last 24 Hrs  T(F): 98.6 (22 @ 11:28), Max: 98.6 (22 @ 05:05)  Vital Signs Last 24 Hrs  HR: 92 (22 @ 11:28) (88 - 98)  BP: 157/94 (22 @ 11:28) (136/74 - 174/86)  RR: 17 (22 @ 11:28)  SpO2: 100% (22 @ 11:28) (97% - 100%)  Wt(kg): --    PHYSICAL EXAM:  Constitutional: non-toxic, no distress  HEAD/EYES: anicteric  ENT:  supple  Cardiovascular:   normal S1, S2, no murmur  Respiratory:  clear BS bilaterally  GI:  soft, non-tender, normal bowel sounds  :  no quershi  Musculoskeletal:  no synovitis  Neurologic: awake and alert, normal strength, no focal findings  Skin:  left antecubital fossa with evidence of phlebitis, not tender, no drainage, however, it was noted he had purulent drainage earlier  Psychiatric:  awake, alert, appropriate mood                       9.6    5.91  )-----------( 244      ( 2022 06:15 )             27.5     137  |  105  |  32<H>  ----------------------------<  178<H>  4.3   |  28  |  1.64<H>    Ca    8.4      2022 06:15  Phos  4.7     04-04  Mg     2.0     04-04    TPro  5.4<L>  /  Alb  2.0<L>  /  TBili  1.4<H>  /  DBili  x   /  AST  27  /  ALT  30  /  AlkPhos  58  04-    Urinalysis Basic - ( 2022 22:34 )  Color: Yellow / Appearance: Clear / S.015 / pH: x  Gluc: x / Ketone: Negative  / Bili: Negative / Urobili: Negative   Blood: x / Protein: 100 / Nitrite: Negative   Leuk Esterase: Negative / RBC: 5-10 /HPF / WBC 0-2 /HPF   Sq Epi: x / Non Sq Epi: Occasional /HPF / Bacteria: Trace /HPF    MICROBIOLOGY:  Rapid RVP Result: NotDetec ( @ 13:37)    RADIOLOGY:  imaging below personally reviewed and agree with findings    US Duplex Venous Lower Ext Complete, Bilateral (22 @ 11:24) >  IMPRESSION:  Technically limited study including nonvisualization of the calf veins with no evidence of deep venous thrombosis in the visualized bilateral lower extremity veins.    NM Pulmonary Ventilation/Perfusion Scan (22 @ 10:10) >  IMPRESSION: Very low probability of pulmonary embolus.    Xray Chest 1 View- PORTABLE-Urgent (22 @ 13:22) >  IMPRESSION: No definite infiltrate.    Transthoracic Echocardiogram (22 @ 07:17) >  Ejection Fraction Visual Estimate: 40-45 %  CONCLUSIONS:  1. Normal mitral valve. Mild mitral regurgitation.  2. Normal trileaflet aortic valve.  3. Aortic Root: 3.7 cm.  4. Severely dilated left atrium.  LA volume index = 66 cc/m2.  5. Moderate concentric left ventricular hypertrophy.  6. Moderate global left ventricular systolic dysfunction.  7. Grade I diastolic dysfunction (Impaired relaxation, mild).  8. Normal right atrium.  9. Normal right ventricular size and systolic function (TAPSE  1.9cm).  10. Unable to estimate RVSP.  11. There is trace tricuspid regurgitation.  12. There is trace pulmonic regurgitation.  13. Small pericardial effusion.

## 2022-04-04 NOTE — PATIENT PROFILE ADULT - FALL HARM RISK - UNIVERSAL INTERVENTIONS
Bed in lowest position, wheels locked, appropriate side rails in place/Call bell, personal items and telephone in reach/Instruct patient to call for assistance before getting out of bed or chair/Non-slip footwear when patient is out of bed/[place] to call system/Physically safe environment - no spills, clutter or unnecessary equipment/Purposeful Proactive Rounding/Room/bathroom lighting operational, light cord in reach

## 2022-04-04 NOTE — CONSULT NOTE ADULT - SUBJECTIVE AND OBJECTIVE BOX
Patient is a 32y old  Male who presents with a chief complaint of shortness of breath (2022 14:13)    HPI:  32M with Autism, HTN, poorly controlled DM (A1c=9.4), lymphedema since age 12.  Developed SOB/cough just before take off on a flight to [place].  He has been non-adherent to his medications and was noted to have hypertension.  ALso, noted increased weight.  HE was brought to [facility].  Eventually, he was transferred to [facility] for cardiac cath.  HE was to undergo cath this afternoon, however, it was noted that he had phlebitis with purulent discharge from the exit site.  No fever/chills.  PIV was removed.  Denies pain at the site.     Labs notable for Cr 1.83 and BNP was 2900.  Work up for DVT/PE was negative.      ID asked to help management.    prior hospital charts reviewed [  x]  primary team notes reviewed [  x]  other consultant notes reviewed [x  ]    PAST MEDICAL & SURGICAL HISTORY:  Diabetes mellitus (A1c=9.4)  Hypertension  Acute combined systolic and diastolic congestive heart failure  Morbidly obese  Lymphedema  Autism (per chart w/o limitations of functionality and intellect; able to work and sign his own consent per chart)    Allergies  Keflex (Hives)    ANTIMICROBIALS:  none    MEDICATIONS  (STANDING):  aspirin enteric coated 81 daily  atorvastatin 40 at bedtime  carvedilol 6.25 every 12 hours  heparin   Injectable 5000 every 12 hours  hydrALAZINE 25 three times a day  insulin glargine Injectable (LANTUS) 40 at bedtime  insulin lispro (ADMELOG) corrective regimen sliding scale  three times a day before meals  insulin lispro (ADMELOG) corrective regimen sliding scale  at bedtime  insulin lispro Injectable (ADMELOG) 15 three times a day before meals    SOCIAL HISTORY:   has a pet cat; 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, 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    FAMILY HISTORY:  Family history of stent (Mother)  mother with coronary stent 51yo    REVIEW OF SYSTEMS  [  ] ROS unobtainable because:    [  ] All other systems negative except as noted below:	    Constitutional:  [ ] fever [ ] chills  [ ] weight loss  [ ] weakness  Skin:  [ ] rash [ ] phlebitis	  Eyes: [ ] icterus [ ] pain  [ ] discharge	  ENMT: [ ] sore throat  [ ] thrush [ ] ulcers [ ] exudates  Respiratory: [ ] dyspnea [ ] hemoptysis [ ] cough [ ] sputum	  Cardiovascular:  [ ] chest pain [ ] palpitations [ ] edema	  Gastrointestinal:  [ ] nausea [ ] vomiting [ ] diarrhea [ ] constipation [ ] pain	  Genitourinary:  [ ] dysuria [ ] frequency [ ] hematuria [ ] discharge [ ] flank pain  [ ] incontinence  Musculoskeletal:  [ ] myalgias [ ] arthralgias [ ] arthritis  [ ] back pain  Neurological:  [ ] headache [ ] seizures  [ ] confusion/altered mental status  Psychiatric:  [ ] anxiety [ ] depression	  Hematology/Lymphatics:  [ ] lymphadenopathy  Endocrine:  [ ] adrenal [ ] thyroid  Allergic/Immunologic:	 [ ] transplant [ ] seasonal    Vital Signs Last 24 Hrs  T(F): 98.6 (22 @ 11:28), Max: 98.6 (22 @ 05:05)  Vital Signs Last 24 Hrs  HR: 92 (22 @ 11:28) (88 - 98)  BP: 157/94 (22 @ 11:28) (136/74 - 174/86)  RR: 17 (22 @ 11:28)  SpO2: 100% (22 @ 11:28) (97% - 100%)  Wt(kg): --    PHYSICAL EXAM:  Constitutional: non-toxic, no distress  HEAD/EYES: anicteric  ENT:  supple  Cardiovascular:   normal S1, S2, no murmur  Respiratory:  clear BS bilaterally  GI:  soft, non-tender, normal bowel sounds  :  no qureshi  Musculoskeletal:  no synovitis  Neurologic: awake and alert, normal strength, no focal findings  Skin:  left antecubital fossa with evidence of phlebitis, not tender, no drainage, however, it was noted he had purulent drainage earlier  Psychiatric:  awake, alert, appropriate mood                       9.6    5.91  )-----------( 244      ( 2022 06:15 )             27.5     137  |  105  |  32<H>  ----------------------------<  178<H>  4.3   |  28  |  1.64<H>    Ca    8.4      2022 06:15  Phos  4.7     04-04  Mg     2.0     04-04    TPro  5.4<L>  /  Alb  2.0<L>  /  TBili  1.4<H>  /  DBili  x   /  AST  27  /  ALT  30  /  AlkPhos  58  04-    Urinalysis Basic - ( 2022 22:34 )  Color: Yellow / Appearance: Clear / S.015 / pH: x  Gluc: x / Ketone: Negative  / Bili: Negative / Urobili: Negative   Blood: x / Protein: 100 / Nitrite: Negative   Leuk Esterase: Negative / RBC: 5-10 /HPF / WBC 0-2 /HPF   Sq Epi: x / Non Sq Epi: Occasional /HPF / Bacteria: Trace /HPF    MICROBIOLOGY:  Rapid RVP Result: NotDetec ( @ 13:37)    RADIOLOGY:  imaging below personally reviewed and agree with findings    US Duplex Venous Lower Ext Complete, Bilateral (22 @ 11:24) >  IMPRESSION:  Technically limited study including nonvisualization of the calf veins with no evidence of deep venous thrombosis in the visualized bilateral lower extremity veins.    NM Pulmonary Ventilation/Perfusion Scan (22 @ 10:10) >  IMPRESSION: Very low probability of pulmonary embolus.    Xray Chest 1 View- PORTABLE-Urgent (22 @ 13:22) >  IMPRESSION: No definite infiltrate.    Transthoracic Echocardiogram (22 @ 07:17) >  Ejection Fraction Visual Estimate: 40-45 %  CONCLUSIONS:  1. Normal mitral valve. Mild mitral regurgitation.  2. Normal trileaflet aortic valve.  3. Aortic Root: 3.7 cm.  4. Severely dilated left atrium.  LA volume index = 66 cc/m2.  5. Moderate concentric left ventricular hypertrophy.  6. Moderate global left ventricular systolic dysfunction.  7. Grade I diastolic dysfunction (Impaired relaxation, mild).  8. Normal right atrium.  9. Normal right ventricular size and systolic function (TAPSE  1.9cm).  10. Unable to estimate RVSP.  11. There is trace tricuspid regurgitation.  12. There is trace pulmonic regurgitation.  13. Small pericardial effusion.

## 2022-04-04 NOTE — TRANSFER ACCEPTANCE NOTE - ASSESSMENT
32 year old male with HTN, diabetes mellitus type 2, lymphedema since age 12 that was recently diagnosed who presented to [facility] ED 3/31/22 complaining of SOB and cough x1week with acute systolic and diastolic CHF, diuresed and transferred to [facility] 4/4 for cath.

## 2022-04-05 LAB
ALBUMIN SERPL ELPH-MCNC: 2.9 G/DL — LOW (ref 3.3–5)
ALP SERPL-CCNC: 66 U/L — SIGNIFICANT CHANGE UP (ref 40–120)
ALT FLD-CCNC: 28 U/L — SIGNIFICANT CHANGE UP (ref 4–41)
ANION GAP SERPL CALC-SCNC: 10 MMOL/L — SIGNIFICANT CHANGE UP (ref 7–14)
AST SERPL-CCNC: 30 U/L — SIGNIFICANT CHANGE UP (ref 4–40)
BILIRUB SERPL-MCNC: 0.9 MG/DL — SIGNIFICANT CHANGE UP (ref 0.2–1.2)
BUN SERPL-MCNC: 34 MG/DL — HIGH (ref 7–23)
CALCIUM SERPL-MCNC: 8.8 MG/DL — SIGNIFICANT CHANGE UP (ref 8.4–10.5)
CHLORIDE SERPL-SCNC: 102 MMOL/L — SIGNIFICANT CHANGE UP (ref 98–107)
CO2 SERPL-SCNC: 25 MMOL/L — SIGNIFICANT CHANGE UP (ref 22–31)
CREAT SERPL-MCNC: 1.84 MG/DL — HIGH (ref 0.5–1.3)
EGFR: 49 ML/MIN/1.73M2 — LOW
GLUCOSE BLDC GLUCOMTR-MCNC: 94 MG/DL — SIGNIFICANT CHANGE UP (ref 70–99)
GLUCOSE SERPL-MCNC: 60 MG/DL — LOW (ref 70–99)
HCT VFR BLD CALC: 28.4 % — LOW (ref 39–50)
HGB BLD-MCNC: 9.6 G/DL — LOW (ref 13–17)
MAGNESIUM SERPL-MCNC: 2 MG/DL — SIGNIFICANT CHANGE UP (ref 1.6–2.6)
MCHC RBC-ENTMCNC: 31 PG — SIGNIFICANT CHANGE UP (ref 27–34)
MCHC RBC-ENTMCNC: 33.8 GM/DL — SIGNIFICANT CHANGE UP (ref 32–36)
MCV RBC AUTO: 91.6 FL — SIGNIFICANT CHANGE UP (ref 80–100)
NRBC # BLD: 0 /100 WBCS — SIGNIFICANT CHANGE UP
NRBC # FLD: 0 K/UL — SIGNIFICANT CHANGE UP
PHOSPHATE SERPL-MCNC: 5 MG/DL — HIGH (ref 2.5–4.5)
PLATELET # BLD AUTO: 297 K/UL — SIGNIFICANT CHANGE UP (ref 150–400)
POTASSIUM SERPL-MCNC: 4.4 MMOL/L — SIGNIFICANT CHANGE UP (ref 3.5–5.3)
POTASSIUM SERPL-SCNC: 4.4 MMOL/L — SIGNIFICANT CHANGE UP (ref 3.5–5.3)
PROT SERPL-MCNC: 6 G/DL — SIGNIFICANT CHANGE UP (ref 6–8.3)
RBC # BLD: 3.1 M/UL — LOW (ref 4.2–5.8)
RBC # FLD: 11.9 % — SIGNIFICANT CHANGE UP (ref 10.3–14.5)
SODIUM SERPL-SCNC: 137 MMOL/L — SIGNIFICANT CHANGE UP (ref 135–145)
WBC # BLD: 8.85 K/UL — SIGNIFICANT CHANGE UP (ref 3.8–10.5)
WBC # FLD AUTO: 8.85 K/UL — SIGNIFICANT CHANGE UP (ref 3.8–10.5)

## 2022-04-05 PROCEDURE — 99232 SBSQ HOSP IP/OBS MODERATE 35: CPT

## 2022-04-05 PROCEDURE — 76770 US EXAM ABDO BACK WALL COMP: CPT | Mod: 26

## 2022-04-05 RX ORDER — INSULIN GLARGINE 100 [IU]/ML
32 INJECTION, SOLUTION SUBCUTANEOUS ONCE
Refills: 0 | Status: COMPLETED | OUTPATIENT
Start: 2022-04-05 | End: 2022-04-05

## 2022-04-05 RX ORDER — INSULIN LISPRO 100/ML
10 VIAL (ML) SUBCUTANEOUS ONCE
Refills: 0 | Status: COMPLETED | OUTPATIENT
Start: 2022-04-05 | End: 2022-04-05

## 2022-04-05 RX ORDER — CHLORHEXIDINE GLUCONATE 213 G/1000ML
1 SOLUTION TOPICAL DAILY
Refills: 0 | Status: DISCONTINUED | OUTPATIENT
Start: 2022-04-05 | End: 2022-04-15

## 2022-04-05 RX ORDER — BUMETANIDE 0.25 MG/ML
1 INJECTION INTRAMUSCULAR; INTRAVENOUS DAILY
Refills: 0 | Status: DISCONTINUED | OUTPATIENT
Start: 2022-04-06 | End: 2022-04-06

## 2022-04-05 RX ADMIN — SODIUM CHLORIDE 3 MILLILITER(S): 9 INJECTION INTRAMUSCULAR; INTRAVENOUS; SUBCUTANEOUS at 17:38

## 2022-04-05 RX ADMIN — HEPARIN SODIUM 5000 UNIT(S): 5000 INJECTION INTRAVENOUS; SUBCUTANEOUS at 05:10

## 2022-04-05 RX ADMIN — INSULIN GLARGINE 32 UNIT(S): 100 INJECTION, SOLUTION SUBCUTANEOUS at 23:24

## 2022-04-05 RX ADMIN — CHLORHEXIDINE GLUCONATE 1 APPLICATION(S): 213 SOLUTION TOPICAL at 21:00

## 2022-04-05 RX ADMIN — Medication 81 MILLIGRAM(S): at 13:31

## 2022-04-05 RX ADMIN — Medication 25 MILLIGRAM(S): at 13:31

## 2022-04-05 RX ADMIN — SODIUM CHLORIDE 3 MILLILITER(S): 9 INJECTION INTRAMUSCULAR; INTRAVENOUS; SUBCUTANEOUS at 11:00

## 2022-04-05 RX ADMIN — Medication 15 UNIT(S): at 17:51

## 2022-04-05 RX ADMIN — Medication 25 MILLIGRAM(S): at 21:01

## 2022-04-05 RX ADMIN — LISINOPRIL 40 MILLIGRAM(S): 2.5 TABLET ORAL at 05:11

## 2022-04-05 RX ADMIN — Medication 15 UNIT(S): at 08:51

## 2022-04-05 RX ADMIN — Medication 25 MILLIGRAM(S): at 05:10

## 2022-04-05 RX ADMIN — CARVEDILOL PHOSPHATE 6.25 MILLIGRAM(S): 80 CAPSULE, EXTENDED RELEASE ORAL at 17:44

## 2022-04-05 RX ADMIN — Medication 40 MILLIGRAM(S): at 05:09

## 2022-04-05 RX ADMIN — CARVEDILOL PHOSPHATE 6.25 MILLIGRAM(S): 80 CAPSULE, EXTENDED RELEASE ORAL at 05:10

## 2022-04-05 RX ADMIN — ATORVASTATIN CALCIUM 40 MILLIGRAM(S): 80 TABLET, FILM COATED ORAL at 21:01

## 2022-04-05 RX ADMIN — Medication 10 UNIT(S): at 13:30

## 2022-04-05 RX ADMIN — SODIUM CHLORIDE 3 MILLILITER(S): 9 INJECTION INTRAMUSCULAR; INTRAVENOUS; SUBCUTANEOUS at 22:15

## 2022-04-05 RX ADMIN — HEPARIN SODIUM 5000 UNIT(S): 5000 INJECTION INTRAVENOUS; SUBCUTANEOUS at 17:51

## 2022-04-05 NOTE — PROGRESS NOTE ADULT - ASSESSMENT
32M with Autism, HTN, poorly controlled DM (A1c=9.4), lymphedema since age 12.  Developed SOB/cough just before take off on a flight to [place].  He has been non-adherent to his medications and was noted to have hypertension.  ALso, noted increased weight.  HE was brought to [facility].  Eventually, he was transferred to [facility] for cardiac cath.  Cath canceled yesterday due to new phlebitis.     Phlebitis  - f/u blood cultures x2  - continue arm elevation and warm compresses  - keflex-allergic  - continue to monitor off antibiotics   - if fever or worsening phlebitis, can give vancomycin (hold for now)

## 2022-04-05 NOTE — PROGRESS NOTE ADULT - SUBJECTIVE AND OBJECTIVE BOX
Patient is a 32y old  Male who presents with a chief complaint of shortness of breath (2022 14:13)    f/u phlebitis    Interval History/ROS:  no fever.  BC drawn yesterday, pending.  denies pain L arm.   no n/v/d.  no abdominal pain.  no dysuria.  Remainder of ROS otherwise negative.    PAST MEDICAL & SURGICAL HISTORY:  Diabetes mellitus (A1c=9.4)  Hypertension  Acute combined systolic and diastolic congestive heart failure  Morbidly obese  Lymphedema  Autism (per chart w/o limitations of functionality and intellect; able to work and sign his own consent per chart)    Allergies  Keflex (Hives)    ANTIMICROBIALS:  none    MEDICATIONS  (STANDING):  aspirin enteric coated 81 daily  atorvastatin 40 at bedtime  carvedilol 6.25 every 12 hours  furosemide   Injectable 40 two times a day  heparin   Injectable 5000 every 12 hours  hydrALAZINE 25 three times a day  insulin glargine Injectable (LANTUS) 40 at bedtime  insulin lispro (ADMELOG) corrective regimen sliding scale  three times a day before meals  insulin lispro (ADMELOG) corrective regimen sliding scale  at bedtime  insulin lispro Injectable (ADMELOG) 15 three times a day before meals  lisinopril 40 daily    Vital Signs Last 24 Hrs  T(F): 98 (22 @ 05:10), Max: 98.6 (22 @ 11:28)  HR: 84 (22 @ 05:10)  BP: 145/89 (22 @ 05:10)  RR: 18 (22 @ 05:10)  SpO2: 100% (22 @ 05:10) (100% - 100%)    PHYSICAL EXAM:  Constitutional: non-toxic, no distress  HEAD/EYES: anicteric  ENT:  supple  Cardiovascular:   normal S1, S2, no murmur  Respiratory:  clear BS bilaterally  GI:  soft, non-tender, normal bowel sounds  :  no qureshi  Musculoskeletal:  no synovitis  Neurologic: awake and alert, normal strength, no focal findings  Skin:  left antecubital fossa resolving phlebitis, not tender, no drainage  Psychiatric:  awake, alert, appropriate mood                                9.6    5.91  )-----------( 244      ( 2022 06:15 )             27.5 -    137  |  105  |  32  ----------------------------<  178  4.3   |  28  |  1.64  Ca    8.4      2022 06:15Phos  4.7     04-04Mg     2.0     04-04  TPro  5.4  /  Alb  2.0  /  TBili  1.4  /  DBili  x   /  AST  27  /  ALT  30  /  AlkPhos  58  04-04    Urinalysis Basic - ( 2022 22:34 )  Color: Yellow / Appearance: Clear / S.015 / pH: x  Gluc: x / Ketone: Negative  / Bili: Negative / Urobili: Negative   Blood: x / Protein: 100 / Nitrite: Negative   Leuk Esterase: Negative / RBC: 5-10 /HPF / WBC 0-2 /HPF   Sq Epi: x / Non Sq Epi: Occasional /HPF / Bacteria: Trace /HPF    MICROBIOLOGY:   BC pending    Rapid RVP Result: NotDete ( @ 13:37)    RADIOLOGY:  imaging below personally reviewed and agree with findings    US Duplex Venous Lower Ext Complete, Bilateral (22 @ 11:24) >  IMPRESSION:  Technically limited study including nonvisualization of the calf veins with no evidence of deep venous thrombosis in the visualized bilateral lower extremity veins.    NM Pulmonary Ventilation/Perfusion Scan (22 @ 10:10) >  IMPRESSION: Very low probability of pulmonary embolus.    Xray Chest 1 View- PORTABLE-Urgent (22 @ 13:22) >  IMPRESSION: No definite infiltrate.    Transthoracic Echocardiogram (22 @ 07:17) >  Ejection Fraction Visual Estimate: 40-45 %  CONCLUSIONS:  1. Normal mitral valve. Mild mitral regurgitation.  2. Normal trileaflet aortic valve.  3. Aortic Root: 3.7 cm.  4. Severely dilated left atrium.  LA volume index = 66 cc/m2.  5. Moderate concentric left ventricular hypertrophy.  6. Moderate global left ventricular systolic dysfunction.  7. Grade I diastolic dysfunction (Impaired relaxation, mild).  8. Normal right atrium.  9. Normal right ventricular size and systolic function (TAPSE  1.9cm).  10. Unable to estimate RVSP.  11. There is trace tricuspid regurgitation.  12. There is trace pulmonic regurgitation.  13. Small pericardial effusion.

## 2022-04-05 NOTE — PROGRESS NOTE ADULT - SUBJECTIVE AND OBJECTIVE BOX
C A R D I O L O G Y  **********************************     DATE OF SERVICE: 04-05-22    Patient denies chest pain or shortness of breath.   Review of systems otherwise (-)  	    aspirin enteric coated 81 milliGRAM(s) Oral daily  atorvastatin 40 milliGRAM(s) Oral at bedtime  carvedilol 6.25 milliGRAM(s) Oral every 12 hours  dextrose 5%. 1000 milliLiter(s) IV Continuous <Continuous>  dextrose 5%. 1000 milliLiter(s) IV Continuous <Continuous>  dextrose 50% Injectable 25 Gram(s) IV Push once  dextrose 50% Injectable 12.5 Gram(s) IV Push once  dextrose 50% Injectable 25 Gram(s) IV Push once  dextrose Oral Gel 15 Gram(s) Oral once PRN  glucagon  Injectable 1 milliGRAM(s) IntraMuscular once  heparin   Injectable 5000 Unit(s) SubCutaneous every 12 hours  hydrALAZINE 25 milliGRAM(s) Oral three times a day  insulin glargine Injectable (LANTUS) 40 Unit(s) SubCutaneous at bedtime  insulin lispro (ADMELOG) corrective regimen sliding scale   SubCutaneous three times a day before meals  insulin lispro (ADMELOG) corrective regimen sliding scale   SubCutaneous at bedtime  insulin lispro Injectable (ADMELOG) 15 Unit(s) SubCutaneous three times a day before meals  lisinopril 40 milliGRAM(s) Oral daily  sodium chloride 0.9% lock flush 3 milliLiter(s) IV Push every 8 hours                            9.6    5.91  )-----------( 244      ( 04 Apr 2022 06:15 )             27.5       04-04    137  |  105  |  32<H>  ----------------------------<  178<H>  4.3   |  28  |  1.64<H>    Ca    8.4      04 Apr 2022 06:15  Phos  4.7     04-04  Mg     2.0     04-04    TPro  5.4<L>  /  Alb  2.0<L>  /  TBili  1.4<H>  /  DBili  x   /  AST  27  /  ALT  30  /  AlkPhos  58  04-04            T(C): 36.6 (04-05-22 @ 11:40), Max: 36.9 (04-04-22 @ 21:00)  HR: 80 (04-05-22 @ 13:20) (80 - 102)  BP: 143/75 (04-05-22 @ 13:20) (127/64 - 145/89)  RR: 18 (04-05-22 @ 11:40) (18 - 18)  SpO2: 100% (04-05-22 @ 11:40) (100% - 100%)  Wt(kg): --    I&O's Summary    04 Apr 2022 07:01  -  05 Apr 2022 07:00  --------------------------------------------------------  IN: 600 mL / OUT: 1000 mL / NET: -400 mL    05 Apr 2022 07:01  -  05 Apr 2022 15:44  --------------------------------------------------------  IN: 0 mL / OUT: 800 mL / NET: -800 mL        Gen: Appears well in NAD  HEENT:  (-)icterus (-)pallor  CV: N S1 S2 1/6 CAMRON (+)2 Pulses B/l  Resp:  Clear to ausculatation B/L, normal effort  GI: (+) BS Soft, NT, ND  Lymph:  (+)Edema, (-)obvious lymphadenopathy  Skin: Warm to touch, Normal turgor  Psych: Appropriate mood and affect      TELEMETRY: 	  Sinus         ASSESSMENT/PLAN: 	32y  Male  PMH of HTN, HLD, Varicose veins presents to the ED for SOB and cough positive trop elevated BNP found with acute systolic heart failure.    - Elevated Ddimmer, VQ scan (-) lower extremity dopplers (-)   - Echo with moderate LV systolic dysfunction   - Volume status has improved - changed to oral diuretics per renal   - cont Coreg  - cath cancelled due to elevated BP and possible IV site infection   - BP has improved after hydralazine  - IV site appears c/d - monitor cx and ID workup   - cath when medically optimized     Michelle Tracy MD

## 2022-04-05 NOTE — CONSULT NOTE ADULT - ASSESSMENT
32y Male with history of HTN presents with SOB transferred for cardiac cath. Nephrology consulted for elevated Scr.    1) SUSANA: likely hemodynamically mediated in setting of CRS? Check UA, urine urea and urine creatinine. Check renal US. Avoid nephrotoxins.    2) CKD Unspecified: Patient denies h/o CKD? but will need to confirm baseline with PMD (Dr. Luís Mast in [place]). Check UA and spot urine TP/CR. Renal US as above. Monitor electrolytes.    3) HTN with CKD: BP improving. Continue with current medications. Monitor BP.    4) LE edema: Improving. Do not suspect lymphedema will improve significantly with IV diuresis. Given improvement in respiratory status, change lasix 40 mg IV twice daily to bumex 1 mg PO daily for maintenance. Check spot urine TP/CR given hypoalbuminemia r/o nephrotic syndrome. TTE with moderate global LVSD and diastolic dysfunction. Monitor UO.    [facility] NEPHROLOGY  Han Choudhury M.D.  Mekhi Verdin D.O.  Camille Brown M.D.  Lorena Wesley, ROLLY, ANP-C    Telephone: [phone]  Facsimile: [phone]    [address] [place] [address]

## 2022-04-05 NOTE — PROGRESS NOTE ADULT - SUBJECTIVE AND OBJECTIVE BOX
Patient is a 32y old  Male who presents with a chief complaint of coronary angiogram, transfer from [facility] (2022 16:07)    PATIENT IS SEEN AND EXAMINED IN MEDICAL FLOOR.  JAELYN [    ]    GEOVANNY [   ]      GT [   ]    ALLERGIES:  Keflex (Hives)      Daily Height in cm: 185.42 (2022 21:00)    Daily Weight in k.7 (2022 05:10)    VITALS:    Vital Signs Last 24 Hrs  T(C): 36.7 (2022 05:10), Max: 37 (2022 11:28)  T(F): 98 (2022 05:10), Max: 98.6 (2022 11:28)  HR: 84 (2022 05:10) (84 - 102)  BP: 145/89 (2022 05:10) (135/86 - 157/94)  BP(mean): --  RR: 18 (2022 05:10) (17 - 18)  SpO2: 100% (2022 05:10) (100% - 100%)    LABS:    CBC Full  -  ( 2022 06:15 )  WBC Count : 5.91 K/uL  RBC Count : 3.03 M/uL  Hemoglobin : 9.6 g/dL  Hematocrit : 27.5 %  Platelet Count - Automated : 244 K/uL  Mean Cell Volume : 90.8 fl  Mean Cell Hemoglobin : 31.7 pg  Mean Cell Hemoglobin Concentration : 34.9 gm/dL  Auto Neutrophil # : 3.21 K/uL  Auto Lymphocyte # : 1.81 K/uL  Auto Monocyte # : 0.65 K/uL  Auto Eosinophil # : 0.18 K/uL  Auto Basophil # : 0.04 K/uL  Auto Neutrophil % : 54.4 %  Auto Lymphocyte % : 30.6 %  Auto Monocyte % : 11.0 %  Auto Eosinophil % : 3.0 %  Auto Basophil % : 0.7 %      04-04    137  |  105  |  32<H>  ----------------------------<  178<H>  4.3   |  28  |  1.64<H>    Ca    8.4      2022 06:15  Phos  4.7     04-04  Mg     2.0     04-04    TPro  5.4<L>  /  Alb  2.0<L>  /  TBili  1.4<H>  /  DBili  x   /  AST  27  /  ALT  30  /  AlkPhos  58      CAPILLARY BLOOD GLUCOSE      POCT Blood Glucose.: 94 mg/dL (2022 08:13)  POCT Blood Glucose.: 227 mg/dL (2022 22:24)  POCT Blood Glucose.: 126 mg/dL (2022 16:19)  POCT Blood Glucose.: 139 mg/dL (2022 13:16)  POCT Blood Glucose.: 191 mg/dL (2022 11:17)        LIVER FUNCTIONS - ( 2022 06:15 )  Alb: 2.0 g/dL / Pro: 5.4 g/dL / ALK PHOS: 58 U/L / ALT: 30 U/L DA / AST: 27 U/L / GGT: x           Creatinine Trend: 1.64<--, 1.64<--, 1.63<--, 1.65<--, 1.83<--  I&O's Summary    2022 07:01  -  2022 07:00  --------------------------------------------------------  IN: 600 mL / OUT: 1000 mL / NET: -400 mL    2022 07:01  -  2022 10:07  --------------------------------------------------------  IN: 0 mL / OUT: 800 mL / NET: -800 mL                MEDICATIONS:    MEDICATIONS  (STANDING):  aspirin enteric coated 81 milliGRAM(s) Oral daily  atorvastatin 40 milliGRAM(s) Oral at bedtime  carvedilol 6.25 milliGRAM(s) Oral every 12 hours  dextrose 5%. 1000 milliLiter(s) (100 mL/Hr) IV Continuous <Continuous>  dextrose 5%. 1000 milliLiter(s) (50 mL/Hr) IV Continuous <Continuous>  dextrose 50% Injectable 25 Gram(s) IV Push once  dextrose 50% Injectable 12.5 Gram(s) IV Push once  dextrose 50% Injectable 25 Gram(s) IV Push once  glucagon  Injectable 1 milliGRAM(s) IntraMuscular once  heparin   Injectable 5000 Unit(s) SubCutaneous every 12 hours  hydrALAZINE 25 milliGRAM(s) Oral three times a day  insulin glargine Injectable (LANTUS) 40 Unit(s) SubCutaneous at bedtime  insulin lispro (ADMELOG) corrective regimen sliding scale   SubCutaneous three times a day before meals  insulin lispro (ADMELOG) corrective regimen sliding scale   SubCutaneous at bedtime  insulin lispro Injectable (ADMELOG) 15 Unit(s) SubCutaneous three times a day before meals  lisinopril 40 milliGRAM(s) Oral daily  sodium chloride 0.9% lock flush 3 milliLiter(s) IV Push every 8 hours      MEDICATIONS  (PRN):  dextrose Oral Gel 15 Gram(s) Oral once PRN Blood Glucose LESS THAN 70 milliGRAM(s)/deciliter      REVIEW OF SYSTEMS:                           ALL ROS DONE [ X   ]    CONSTITUTIONAL:  LETHARGIC [   ], FEVER [   ], UNRESPONSIVE [   ]  CVS:  CP  [   ], SOB, [   ], PALPITATIONS [   ], DIZZYNESS [   ]  RS: COUGH [   ], SPUTUM [   ]  GI: ABDOMINAL PAIN [   ], NAUSEA [   ], VOMITINGS [   ], DIARRHEA [   ], CONSTIPATION [   ]  :  DYSURIA [   ], NOCTURIA [   ], INCREASED FREQUENCY [   ], DRIBLING [   ],  SKELETAL: PAINFUL JOINTS [   ], SWOLLEN JOINTS [   ], NECK ACHE [   ], LOW BACK ACHE [   ],  SKIN : ULCERS [   ], RASH [   ], ITCHING [   ]  CNS: HEAD ACHE [   ], DOUBLE VISION [   ], BLURRED VISION [   ], AMS / CONFUSION [   ], SEIZURES [   ], WEAKNESS [   ],TINGLING / NUMBNESS [   ]    PHYSICAL EXAMINATION:  GENERAL APPEARANCE: NO DISTRESS  HEENT:  NO PALLOR, NO  JVD,  NO   NODES, NECK SUPPLE  CVS: S1 +, S2 +,   RS: AEEB,  OCCASIONAL  RALES +,   NO RONCHI  ABD: SOFT, NT, NO, BS +  EXT: PE ++    LEFT FOREARM W/ ? SMALL WOUND DISTAL TO ANTECUBITAL FOSSA [UNABLE TO EXPRESS DISCHARGE ]  SKIN: WARM,   SKELETAL:  ROM ACCEPTABLE  CNS:  AAO X 3    RADIOLOGY :    ACC: [id number] EXAM:  US DPLX LWR EXT VEINS COMPL BI                          PROCEDURE DATE:  2022          INTERPRETATION:  CLINICAL INFORMATION: Shortness of breath. Diabetes.    COMPARISON: None available.    TECHNIQUE: Duplex sonography of the BILATERAL LOWER extremity veins with   color and spectral Doppler, with and without compression. The examination   is technically limited secondary to subcutaneous edema in the calves and   body habitus.    FINDINGS:    RIGHT:  Normal compressibility of the RIGHT common femoral, femoral and popliteal   veins.  Doppler examination shows normal spontaneous and phasic flow.  Calf veins not seen.    Subcutaneous edema in the calf.    LEFT:  Normal compressibility of the LEFT common femoral, femoral and popliteal   veins.  Doppler examination shows normal spontaneous and phasic flow.  Calf veins not seen.    Subcutaneous edema in the calf.    IMPRESSION:    Technically limited study including nonvisualization of the calf veins   with no evidence of deep venous thrombosis in the visualized bilateral   lower extremity veins.    =========================    ACC: [id number] EXAM:  NM PULM VENTILATION PERFUS IMG                          PROCEDURE DATE:  2022          INTERPRETATION:  CLINICAL INFORMATION: 32 year old male with dyspnea,   elevated serum creatinine level; referred to evaluate for pulmonary   embolism.    RADIOPHARMACEUTICAL: 1.0 mCi Tc-99m-DTPA via inhalation; 6.2 mCi   Tc-99m-MAA, I.V.    TECHNIQUE:  Ventilation and perfusion images of the lungs were obtained   following administration of Tc-99m-DTPA and Tc-99m-MAA. Images were   obtained in the anterior, posterior, both lateral, and all 4 oblique   projections. The study was interpreted in conjunction with a chest   radiograph of 3/31/2022.    COMPARISON: No prior lung V/Q scan.    FINDINGS: There is heterogeneous distribution of radiopharmaceutical in   the lungs on the ventilation and perfusion images. There are no segmental   perfusion defects.    IMPRESSION: Very low probability of pulmonary embolus.      ASSESSMENT :       PLAN:  HPI:  32 year old male with Autism (without limitations of functionality and intellect; able to work and sign his own consent per mother who is at bedside)HTN, diabetes mellitus type 2, lymphedema since age 12 that was recently diagnosed who presented to [facility] ED 3/31/22 complaining of SOB and cough x1week. Patient states that he has not taken his Lantus 35units suc bedtime/Lispro 15unites with meals or Lisinopril 40mg po daily as he ran out of refills and has been waiting for an appointment.  He has noted increased abdominal weight with tighter pants along with SOB walking (can not quantify).  Patient states he was about to take off on a flight to [place] when he started having SOB and a cough with productive of clear sputum while on the airplane on the tarmac.  Flight crew activated EMS and patient was BIBA to [facility]. Patient R/O MI with negative troponins. Labs were remarkable for Cr 1.83 and BNP was 2900 for which he was started on IV Lasix diuresis for acute systolic and diastolic CHF and Ddimer 335 with unremarkable LE dopplers and V/Q scan with very low probability of PE. Denies chest pain, fever, chills, abd pain, N/V/D.   Renal consulted for elevated Cr, likely due to DM2. Started on Mucomyst 1200mg po bid x4 doses in anticipation for cath.  Underwent an transthoracic Echo revealing EF 40-45%, mod global LV systolic dysfunction, mod concentric LV hypertrophy, sev dil LA, no sig valvular disease.   In light of patients cardiac risk factors, symptoms and abnormal noninvasive test findings the patient is now transferred to [facility] for a cardiac catheterization to evaluate for ischemic etiology of CHF.   MEDS at [facility]: Coreg 3.125mg po BID, ASA 81mg po daily, Lipitor 40mg po daily, lisinopril 40mg po daily, Lantus 40units subcutaneous bedtime, lasix 40mg IV BID, Lispro 15units subc TID meals    Denies fever, cough, chills, headache, flu like symptoms, sick contact or recent travel  COVID PCR not detected on 4/3/22    UA with protein and small blood. Check spot Upr/Cr3  Pt will eventually benefit from ACEi/ARB due to proteinuria and HF, once renal function is stable (post cath).    (2022 14:13)    # CASE D/W MOTHER, ALBERTO SIMMONS @ BEDSIDE[] - ALL QUESTIONS ANSWERED    # ? LEFT FOREARM PHLEBITIS   - F/U BCX, MONITOR SITE  - WARM COMPRESSES, ARM ELEVATION  - ID CONSULT IN PROGRESS    # SUSPECT NEW ONSET CHF - SYSTOLIC AND DIASTOLIC DYSFUNCTION  # HYPERTENSIVE URGENCY, HTN  # B/L LE EDEMA - SUSPECT S/T CHF AND ?VARICOSE VEINS  - MONITOR ON TELEMETRY, TRENDED TROPONINS  - LASIX, STATIN, COREG  - REVIEWED TSH/LIPID PANEL/HBA1C  - CARDIOLOGY CONSULT    - ECHO - MILD MR, DILATED LA, CONCENTRIC LVH, GLOBAL LV SYSTOLIC DYSFUNCTION, G1DD  - TRANSFERRED TO [facility] FOR CARDIAC CATH    # ELEVATED D-DIMER  - V/Q SCAN - LOW PROBABILITY PE  - DOPPLER LE - NEGATIVE FOR DVT    # SUSANA VS. CKD - REVIEWED UA, F/U BLADDER SCAN, MONITOR CR, AVOID NEPHROTOXIC AGENTS  - NEPHROLOGY CONSULT IN PROGRESS    # SUSPECT CAMERON  - OUTPATIENT SLEEP STUDY    # MORBID OBESITY   - NUTRITION CONSULT    # DM2, NONCOMPLIANT W/ INSULIN - HBA1C 9.4 - LANTUS, SSI + FS    # HLD - STARTED STATIN, REVIEWED LIPID PANEL    # GI AND DVT PPX

## 2022-04-05 NOTE — CONSULT NOTE ADULT - SUBJECTIVE AND OBJECTIVE BOX
[facility] NEPHROLOGY- CONSULTATION NOTE    32y Male with history of below presents with SOB transferred for cardiac cath. Nephrology consulted for elevated Scr.    Patient known from [facility] for evaluation of elevated Scr where Scr was noted to be elevated on admission and remained stable despite being started on lasix 40 mg IV twice daily. Patient transferred for cardiac cath which was cancelled yesterday due to uncontrolled HTN and IV access issues. Patient denies any prior history of kidney disease. Patient admits to h/o HTN for 2 years. Patient denies any h/o DM. Patient admits to using diuretics in the past but denies any significant improvement in LE lymphedema.     REVIEW OF SYSTEMS:  Gen: no changes in weight  HEENT: no rhinorrhea  Neck: no sore throat  Cards: no chest pain  Resp: no dyspnea  GI: no nausea or vomiting or diarrhea  : no dysuria or hematuria  Vascular: + LE edema  Derm: no rashes  Neuro: no numbness/tingling    Keflex (Hives)      Home Medications Reviewed  Hospital Medications:   MEDICATIONS  (STANDING):  acetylcysteine  Oral Solution 1200 milliGRAM(s) Oral every 12 hours  aspirin enteric coated 81 milliGRAM(s) Oral daily  atorvastatin 40 milliGRAM(s) Oral at bedtime  carvedilol 6.25 milliGRAM(s) Oral every 12 hours  dextrose 5%. 1000 milliLiter(s) (100 mL/Hr) IV Continuous <Continuous>  dextrose 5%. 1000 milliLiter(s) (50 mL/Hr) IV Continuous <Continuous>  dextrose 50% Injectable 25 Gram(s) IV Push once  dextrose 50% Injectable 12.5 Gram(s) IV Push once  dextrose 50% Injectable 25 Gram(s) IV Push once  furosemide   Injectable 40 milliGRAM(s) IV Push two times a day  glucagon  Injectable 1 milliGRAM(s) IntraMuscular once  heparin   Injectable 5000 Unit(s) SubCutaneous every 12 hours  hydrALAZINE 25 milliGRAM(s) Oral three times a day  insulin glargine Injectable (LANTUS) 40 Unit(s) SubCutaneous at bedtime  insulin lispro (ADMELOG) corrective regimen sliding scale   SubCutaneous three times a day before meals  insulin lispro (ADMELOG) corrective regimen sliding scale   SubCutaneous at bedtime  insulin lispro Injectable (ADMELOG) 15 Unit(s) SubCutaneous three times a day before meals  lisinopril 40 milliGRAM(s) Oral daily  sodium chloride 0.9% lock flush 3 milliLiter(s) IV Push every 8 hours      PAST MEDICAL & SURGICAL HISTORY:  Diabetes mellitus    Hypertension    Acute combined systolic and diastolic congestive heart failure    Morbidly obese    Lymphedema    Autism  without limitations of functionality and intellect; able to work and sign his own consent per mother who is at bedside    No significant past surgical history        FAMILY HISTORY:  Family history of stent (Mother)  mother with coronary stent 49yo        SOCIAL HISTORY:  Denies toxic substance use     VITALS:  T(F): 98 (22 @ 05:10), Max: 98.6 (22 @ 11:28)  HR: 84 (22 @ 05:10)  BP: 145/89 (22 @ 05:10)  RR: 18 (22 @ 05:10)  SpO2: 100% (22 @ 05:10)  Wt(kg): --     @ 07:01  -   @ 07:00  --------------------------------------------------------  IN: 600 mL / OUT: 1000 mL / NET: -400 mL     @ 07:01  -   @ 09:27  --------------------------------------------------------  IN: 0 mL / OUT: 800 mL / NET: -800 mL      Height (cm): 185.4 ( @ 21:00)  Weight (kg): 170 ( @ 21:00)  BMI (kg/m2): 49.5 ( @ 21:00)  BSA (m2): 2.81 ( @ 21:00)    PHYSICAL EXAM:  Gen: NAD, calm  HEENT: MMM  Neck: no JVD  Cards: RRR, +S1/S2, no M/G/R  Resp: CTA B/L  GI: soft, NT/ND, NABS  : no CVA tenderness  Vascular: + LE lymphedema  Derm: no rashes  Neuro: non-focal    LABS:      137  |  105  |  32<H>  ----------------------------<  178<H>  4.3   |  28  |  1.64<H>    Ca    8.4      2022 06:15  Phos  4.7       Mg     2.0         TPro  5.4<L>  /  Alb  2.0<L>  /  TBili  1.4<H>  /  DBili      /  AST  27  /  ALT  30  /  AlkPhos  58      Creatinine Trend: 1.64 <--, 1.64 <--, 1.63 <--, 1.65 <--, 1.83 <--                        9.6    5.91  )-----------( 244      ( 2022 06:15 )             27.5     Urine Studies:  Urinalysis Basic - ( 2022 22:34 )    Color: Yellow / Appearance: Clear / S.015 / pH:   Gluc:  / Ketone: Negative  / Bili: Negative / Urobili: Negative   Blood:  / Protein: 100 / Nitrite: Negative   Leuk Esterase: Negative / RBC: 5-10 /HPF / WBC 0-2 /HPF   Sq Epi:  / Non Sq Epi: Occasional /HPF / Bacteria: Trace /HPF      Sodium, Random Urine: 95 mmol/L ( @ 22:33)  Creatinine, Random Urine: 62 mg/dL ( @ 22:33)      RADIOLOGY & ADDITIONAL STUDIES:    < from: US Duplex Venous Lower Ext Complete, Bilateral (22 @ 11:24) >  IMPRESSION:    Technically limited study including nonvisualization of the calf veins   with no evidence of deep venous thrombosis in the visualized bilateral   lower extremity veins.    --- End of Report ---        < end of copied text >      < from: NM Pulmonary Ventilation/Perfusion Scan (22 @ 10:10) >  IMPRESSION: Very low probability of pulmonary embolus.    --- End of Report ---    < end of copied text >      < from: Xray Chest 1 View- PORTABLE-Urgent (22 @ 13:22) >  IMPRESSION: No definite infiltrate.    --- End of Report ---    < end of copied text >

## 2022-04-06 LAB
ANION GAP SERPL CALC-SCNC: 10 MMOL/L — SIGNIFICANT CHANGE UP (ref 7–14)
APPEARANCE UR: CLEAR — SIGNIFICANT CHANGE UP
ASO AB SER QL: 53 IU/ML — SIGNIFICANT CHANGE UP (ref 20–200)
BACTERIA # UR AUTO: NEGATIVE — SIGNIFICANT CHANGE UP
BILIRUB UR-MCNC: NEGATIVE — SIGNIFICANT CHANGE UP
BUN SERPL-MCNC: 34 MG/DL — HIGH (ref 7–23)
C3 SERPL-MCNC: 128 MG/DL — SIGNIFICANT CHANGE UP (ref 90–180)
C4 SERPL-MCNC: 47 MG/DL — HIGH (ref 10–40)
CALCIUM SERPL-MCNC: 8.6 MG/DL — SIGNIFICANT CHANGE UP (ref 8.4–10.5)
CHLORIDE SERPL-SCNC: 100 MMOL/L — SIGNIFICANT CHANGE UP (ref 98–107)
CO2 SERPL-SCNC: 24 MMOL/L — SIGNIFICANT CHANGE UP (ref 22–31)
COLOR SPEC: SIGNIFICANT CHANGE UP
CREAT ?TM UR-MCNC: 94 MG/DL — SIGNIFICANT CHANGE UP
CREAT SERPL-MCNC: 1.91 MG/DL — HIGH (ref 0.5–1.3)
DIFF PNL FLD: NEGATIVE — SIGNIFICANT CHANGE UP
EGFR: 47 ML/MIN/1.73M2 — LOW
EPI CELLS # UR: 0 /HPF — SIGNIFICANT CHANGE UP (ref 0–5)
GLUCOSE BLDC GLUCOMTR-MCNC: 117 MG/DL — HIGH (ref 70–99)
GLUCOSE BLDC GLUCOMTR-MCNC: 135 MG/DL — HIGH (ref 70–99)
GLUCOSE BLDC GLUCOMTR-MCNC: 178 MG/DL — HIGH (ref 70–99)
GLUCOSE SERPL-MCNC: 78 MG/DL — SIGNIFICANT CHANGE UP (ref 70–99)
GLUCOSE UR QL: NEGATIVE — SIGNIFICANT CHANGE UP
HCT VFR BLD CALC: 27.2 % — LOW (ref 39–50)
HGB BLD-MCNC: 9.2 G/DL — LOW (ref 13–17)
HYALINE CASTS # UR AUTO: 0 /LPF — SIGNIFICANT CHANGE UP (ref 0–7)
KETONES UR-MCNC: NEGATIVE — SIGNIFICANT CHANGE UP
LEUKOCYTE ESTERASE UR-ACNC: NEGATIVE — SIGNIFICANT CHANGE UP
MAGNESIUM SERPL-MCNC: 2.2 MG/DL — SIGNIFICANT CHANGE UP (ref 1.6–2.6)
MCHC RBC-ENTMCNC: 31.5 PG — SIGNIFICANT CHANGE UP (ref 27–34)
MCHC RBC-ENTMCNC: 33.8 GM/DL — SIGNIFICANT CHANGE UP (ref 32–36)
MCV RBC AUTO: 93.2 FL — SIGNIFICANT CHANGE UP (ref 80–100)
NITRITE UR-MCNC: NEGATIVE — SIGNIFICANT CHANGE UP
NRBC # BLD: 0 /100 WBCS — SIGNIFICANT CHANGE UP
NRBC # FLD: 0 K/UL — SIGNIFICANT CHANGE UP
PH UR: 5.5 — SIGNIFICANT CHANGE UP (ref 5–8)
PHOSPHATE SERPL-MCNC: 4.8 MG/DL — HIGH (ref 2.5–4.5)
PLATELET # BLD AUTO: 273 K/UL — SIGNIFICANT CHANGE UP (ref 150–400)
POTASSIUM SERPL-MCNC: 4.7 MMOL/L — SIGNIFICANT CHANGE UP (ref 3.5–5.3)
POTASSIUM SERPL-SCNC: 4.7 MMOL/L — SIGNIFICANT CHANGE UP (ref 3.5–5.3)
PROT ?TM UR-MCNC: 116 MG/DL — SIGNIFICANT CHANGE UP
PROT SERPL-MCNC: 5.8 G/DL — LOW (ref 6–8.3)
PROT UR-MCNC: ABNORMAL
PROT/CREAT UR-RTO: 1.2 RATIO — HIGH (ref 0–0.2)
RBC # BLD: 2.92 M/UL — LOW (ref 4.2–5.8)
RBC # FLD: 11.9 % — SIGNIFICANT CHANGE UP (ref 10.3–14.5)
RBC CASTS # UR COMP ASSIST: 5 /HPF — HIGH (ref 0–4)
SODIUM SERPL-SCNC: 134 MMOL/L — LOW (ref 135–145)
SP GR SPEC: 1.01 — SIGNIFICANT CHANGE UP (ref 1–1.05)
UROBILINOGEN FLD QL: SIGNIFICANT CHANGE UP
UUN UR-MCNC: 486.8 MG/DL — SIGNIFICANT CHANGE UP
WBC # BLD: 5.8 K/UL — SIGNIFICANT CHANGE UP (ref 3.8–10.5)
WBC # FLD AUTO: 5.8 K/UL — SIGNIFICANT CHANGE UP (ref 3.8–10.5)
WBC UR QL: 1 /HPF — SIGNIFICANT CHANGE UP (ref 0–5)

## 2022-04-06 PROCEDURE — 99232 SBSQ HOSP IP/OBS MODERATE 35: CPT

## 2022-04-06 PROCEDURE — 86334 IMMUNOFIX E-PHORESIS SERUM: CPT | Mod: 26

## 2022-04-06 PROCEDURE — 84165 PROTEIN E-PHORESIS SERUM: CPT | Mod: 26

## 2022-04-06 RX ORDER — INSULIN LISPRO 100/ML
6 VIAL (ML) SUBCUTANEOUS ONCE
Refills: 0 | Status: COMPLETED | OUTPATIENT
Start: 2022-04-06 | End: 2022-04-06

## 2022-04-06 RX ORDER — ACETYLCYSTEINE 200 MG/ML
1200 VIAL (ML) MISCELLANEOUS EVERY 12 HOURS
Refills: 0 | Status: DISCONTINUED | OUTPATIENT
Start: 2022-04-06 | End: 2022-04-07

## 2022-04-06 RX ADMIN — HEPARIN SODIUM 5000 UNIT(S): 5000 INJECTION INTRAVENOUS; SUBCUTANEOUS at 18:30

## 2022-04-06 RX ADMIN — SODIUM CHLORIDE 3 MILLILITER(S): 9 INJECTION INTRAMUSCULAR; INTRAVENOUS; SUBCUTANEOUS at 06:31

## 2022-04-06 RX ADMIN — BUMETANIDE 1 MILLIGRAM(S): 0.25 INJECTION INTRAMUSCULAR; INTRAVENOUS at 05:44

## 2022-04-06 RX ADMIN — Medication 15 UNIT(S): at 13:08

## 2022-04-06 RX ADMIN — SODIUM CHLORIDE 3 MILLILITER(S): 9 INJECTION INTRAMUSCULAR; INTRAVENOUS; SUBCUTANEOUS at 14:53

## 2022-04-06 RX ADMIN — Medication 1200 MILLIGRAM(S): at 19:16

## 2022-04-06 RX ADMIN — ATORVASTATIN CALCIUM 40 MILLIGRAM(S): 80 TABLET, FILM COATED ORAL at 22:41

## 2022-04-06 RX ADMIN — CHLORHEXIDINE GLUCONATE 1 APPLICATION(S): 213 SOLUTION TOPICAL at 18:30

## 2022-04-06 RX ADMIN — Medication 25 MILLIGRAM(S): at 05:43

## 2022-04-06 RX ADMIN — Medication 25 MILLIGRAM(S): at 22:41

## 2022-04-06 RX ADMIN — CARVEDILOL PHOSPHATE 6.25 MILLIGRAM(S): 80 CAPSULE, EXTENDED RELEASE ORAL at 17:17

## 2022-04-06 RX ADMIN — SODIUM CHLORIDE 3 MILLILITER(S): 9 INJECTION INTRAMUSCULAR; INTRAVENOUS; SUBCUTANEOUS at 22:02

## 2022-04-06 RX ADMIN — Medication 15 UNIT(S): at 17:17

## 2022-04-06 RX ADMIN — INSULIN GLARGINE 40 UNIT(S): 100 INJECTION, SOLUTION SUBCUTANEOUS at 22:40

## 2022-04-06 RX ADMIN — HEPARIN SODIUM 5000 UNIT(S): 5000 INJECTION INTRAVENOUS; SUBCUTANEOUS at 05:37

## 2022-04-06 RX ADMIN — Medication 6 UNIT(S): at 08:54

## 2022-04-06 RX ADMIN — Medication 25 MILLIGRAM(S): at 13:08

## 2022-04-06 RX ADMIN — CARVEDILOL PHOSPHATE 6.25 MILLIGRAM(S): 80 CAPSULE, EXTENDED RELEASE ORAL at 05:44

## 2022-04-06 RX ADMIN — LISINOPRIL 40 MILLIGRAM(S): 2.5 TABLET ORAL at 05:43

## 2022-04-06 NOTE — CONSULT NOTE ADULT - ASSESSMENT
Assessment/Plan: 32y Male with history of HTN presents with SOB transferred for cardiac cath also found to have SUSANA and UA with microscopic hematuria and subnephrotic range proteinuria. FeUrea low. Renal US negative for obstruction    -- IR will plan to perform renal biopsy on Mon 4/11  -- NPO at midnight on Sun 4/10  -- ASA last dose on 4/5, continue to hold  -- Please obtain COVID PCR within 5 days of procedure.  -- Mon AM coags and labs, active T&S      Thank You for the consultation.   Assessment/Plan: 32y Male with history of HTN presents with SOB transferred for cardiac cath also found to have SUSANA and UA with microscopic hematuria and subnephrotic range proteinuria. FeUrea low. Renal US negative for obstruction    -- IR will plan to perform renal biopsy on Mon 4/11  -- NPO at midnight on Sun 4/10  -- ASA last dose on 4/5, continue to hold  -- Please obtain COVID PCR within 5 days of procedure.  -- Mon 4AM coags and labs, active T&S      Thank You for the consultation.

## 2022-04-06 NOTE — DIETITIAN INITIAL EVALUATION ADULT - PERTINENT LABORATORY DATA
04-06    134<L>  |  100  |  34<H>  ----------------------------<  78  4.7   |  24  |  1.91<H>    Ca    8.6      06 Apr 2022 07:44  Phos  4.8     04-06  Mg     2.20     04-06    TPro  6.0  /  Alb  2.9<L>  /  TBili  0.9  /  DBili  x   /  AST  30  /  ALT  28  /  AlkPhos  66  04-05  A1C with Estimated Average Glucose Result: 9.4 % (04-01-22 @ 19:20)    APILLARY BLOOD GLUCOSE  89 (06 Apr 2022 08:30)  106 (05 Apr 2022 22:58)  108 (05 Apr 2022 21:50)  89 (05 Apr 2022 21:20)  129 (05 Apr 2022 17:20)  84 (05 Apr 2022 13:20)

## 2022-04-06 NOTE — CHART NOTE - NSCHARTNOTEFT_GEN_A_CORE
Held 20% lantus secondary to borderline low - normal blood sugar. FS 89 initially,  gave pt juices and cookies - only improved to108.      Vital Signs Last 24 Hrs  T(C): 36.8 (05 Apr 2022 21:00), Max: 36.8 (05 Apr 2022 21:00)  T(F): 98.3 (05 Apr 2022 21:00), Max: 98.3 (05 Apr 2022 21:00)  HR: 83 (05 Apr 2022 21:00) (80 - 90)  BP: 119/60 (05 Apr 2022 21:00) (119/60 - 154/90)  BP(mean): --  RR: 18 (05 Apr 2022 21:00) (18 - 18)  SpO2: 98% (05 Apr 2022 21:00) (98% - 100%)      -32 units lantus as a one time dose given  -40 units lantus qhs to resume tomorrow night.

## 2022-04-06 NOTE — DIETITIAN INITIAL EVALUATION ADULT - OTHER INFO
Patient reports appetite is good at this time, with PO >75% at breakfast this AM reported. RDN discussed DASH (cholesterol & Na restricted)/DM diet with patient today with handouts provided, patient verbalized understanding.

## 2022-04-06 NOTE — PROGRESS NOTE ADULT - SUBJECTIVE AND OBJECTIVE BOX
C A R D I O L O G Y  **********************************     DATE OF SERVICE: 04-06-22    Patient denies chest pain or shortness of breath.   Review of systems otherwise (-)    Review of Systems:   Constitutional: [ ] fevers, [ ] chills.   Skin: [ ] dry skin. [ ] rashes.  Psychiatric: [ ] depression, [ ] anxiety.   Gastrointestinal: [ ] BRBPR, [ ] melena.   Neurological: [ ] confusion. [ ] seizures. [ ] shuffling gait.   Ears,Nose,Mouth and Throat: [ ] ear pain [ ] sore throat.   Eyes: [ ] diplopia.   Respiratory: [ ] hemoptysis. [ ] shortness of breath  Cardiovascular: See HPI above  Hematologic/Lymphatic: [ ] anemia. [ ] painful nodes. [ ] prolonged bleeding.   Genitourinary: [ ] hematuria. [ ] flank pain.   Endocrine: [ ] significant change in weight. [ ] intolerance to heat and cold.     Review of systems [x ] otherwise negative, [ ] otherwise unable to obtain    FH: no family history of sudden cardiac death in first degree relatives    SH: [ ] tobacco, [ ] alcohol, [ ] drugs    acetylcysteine  Oral Solution 1200 milliGRAM(s) Oral every 12 hours  atorvastatin 40 milliGRAM(s) Oral at bedtime  carvedilol 6.25 milliGRAM(s) Oral every 12 hours  chlorhexidine 2% Cloths 1 Application(s) Topical daily  dextrose 5%. 1000 milliLiter(s) IV Continuous <Continuous>  dextrose 5%. 1000 milliLiter(s) IV Continuous <Continuous>  dextrose 50% Injectable 25 Gram(s) IV Push once  dextrose 50% Injectable 12.5 Gram(s) IV Push once  dextrose 50% Injectable 25 Gram(s) IV Push once  dextrose Oral Gel 15 Gram(s) Oral once PRN  glucagon  Injectable 1 milliGRAM(s) IntraMuscular once  heparin   Injectable 5000 Unit(s) SubCutaneous every 12 hours  hydrALAZINE 25 milliGRAM(s) Oral three times a day  insulin glargine Injectable (LANTUS) 40 Unit(s) SubCutaneous at bedtime  insulin lispro (ADMELOG) corrective regimen sliding scale   SubCutaneous three times a day before meals  insulin lispro (ADMELOG) corrective regimen sliding scale   SubCutaneous at bedtime  insulin lispro Injectable (ADMELOG) 15 Unit(s) SubCutaneous three times a day before meals  lisinopril 40 milliGRAM(s) Oral daily  sodium chloride 0.9% lock flush 3 milliLiter(s) IV Push every 8 hours                            9.2    5.80  )-----------( 273      ( 06 Apr 2022 07:44 )             27.2       04-06    134<L>  |  100  |  34<H>  ----------------------------<  78  4.7   |  24  |  1.91<H>    Ca    8.6      06 Apr 2022 07:44  Phos  4.8     04-06  Mg     2.20     04-06    TPro  6.0  /  Alb  2.9<L>  /  TBili  0.9  /  DBili  x   /  AST  30  /  ALT  28  /  AlkPhos  66  04-05      T(C): 36.8 (04-06-22 @ 05:40), Max: 36.8 (04-05-22 @ 21:00)  HR: 86 (04-06-22 @ 05:40) (80 - 90)  BP: 140/89 (04-06-22 @ 05:40) (119/60 - 154/90)  RR: 18 (04-06-22 @ 05:40) (18 - 18)  SpO2: 99% (04-06-22 @ 05:40) (98% - 99%)  Wt(kg): --    I&O's Summary    05 Apr 2022 07:01  -  06 Apr 2022 07:00  --------------------------------------------------------  IN: 1400 mL / OUT: 2800 mL / NET: -1400 mL    06 Apr 2022 07:01  -  06 Apr 2022 12:58  --------------------------------------------------------  IN: 200 mL / OUT: 600 mL / NET: -400 mL        General: Well nourished in no acute distress. Alert and Oriented * 3.   Head: Normocephalic and atraumatic.   Neck: No JVD. No bruits. Supple. Does not appear to be enlarged.   Cardiovascular: + S1,S2 ; RRR Soft systolic murmur at the left lower sternal border. No rubs noted.    Lungs: CTA b/l. No rhonchi, rales or wheezes.   Abdomen: + BS, soft. Non tender. Non distended. No rebound. No guarding.   Extremities: No clubbing/cyanosis/edema.   Neurologic: Moves all four extremities. Full range of motion.   Skin: Warm and moist. The patient's skin has normal elasticity and good skin turgor.   Psychiatric: Appropriate mood and affect.  Musculoskeletal: Normal range of motion, normal strength    TELEMETRY: 	  Sinus     < from: Transthoracic Echocardiogram (04.01.22 @ 07:17) >  CONCLUSIONS:  1. Normal mitral valve. Mild mitral regurgitation.  2. Normal trileaflet aortic valve.  3. Aortic Root: 3.7 cm.  4. Severely dilated left atrium.  LA volume index = 66  cc/m2.  5. Moderate concentric left ventricular hypertrophy.  6. Moderate global left ventricular systolic dysfunction.  7. Grade I diastolic dysfunction (Impaired relaxation,  mild).  8. Normal right atrium.  9. Normal right ventricular size and systolic function  (TAPSE  1.9cm).  10. Unable to estimate RVSP.  11. There is trace tricuspid regurgitation.  12. There is trace pulmonic regurgitation.  13. Small pericardial effusion.    < end of copied text >        ASSESSMENT/PLAN: 	32y  Male  PMH of HTN, HLD, Varicose veins presents to the ED for SOB and cough positive trop elevated BNP found with acute systolic heart failure.    - PT with Elevated Ddimer on admit, VQ scan (-) and lower extremity dopplers (-)   - Echo with moderate LV systolic dysfunction   - s/p IV diuresis, oral diuretics on hold   - cont Coreg  - cath cancelled due to elevated BP and possible IV site infection   - BP has improved after hydralazine  -ID eval appreciated, Blood cx negative  -Renal eval noted, plan for possible renal bx, will need to discuss timing   - cath when medically optimized

## 2022-04-06 NOTE — DIETITIAN INITIAL EVALUATION ADULT - ADD RECOMMEND
1. Monitor weights, labs, BM's, skin integrity, PO intake/tolerance. 2. Reinforce DASH (cholesterol & Na restricted)/DM diet with patient PRN. 3. May consider consult to endocrinology. 4. Recommend patient to follow outpatient endocrinologist/RDN for long term DM/weight management.

## 2022-04-06 NOTE — PROGRESS NOTE ADULT - SUBJECTIVE AND OBJECTIVE BOX
[facility] NEPHROLOGY- PROGRESS NOTE    32y Male with history of HTN presents with SOB transferred for cardiac cath. Nephrology consulted for elevated Scr.    REVIEW OF SYSTEMS:  Gen: no changes in weight  Cards: no chest pain  Resp: no dyspnea  GI: no nausea or vomiting or diarrhea  Vascular: + LE edema    Keflex (Hives)      Hospital Medications: Medications reviewed    VITALS:  T(F): 98.2 (22 @ 05:40), Max: 98.3 (22 @ 21:00)  HR: 86 (22 @ 05:40)  BP: 140/89 (22 @ 05:40)  RR: 18 (22 @ 05:40)  SpO2: 99% (22 @ 05:40)  Wt(kg): --  Height (cm): 185.4 ( @ 21:00), 185.4 ( @ 21:42)  Weight (kg): 170 ( 21:00), 171 ( 21:42)  BMI (kg/m2): 49.5 ( @ 21:00), 49.7 ( @ 21:42)  BSA (m2): 2.81 ( @ 21:00), 2.82 ( @ 21:42)    0405 @ 07:01  -   @ 07:00  --------------------------------------------------------  IN: 1400 mL / OUT: 2800 mL / NET: -1400 mL        PHYSICAL EXAM:    Gen: NAD, calm  Cards: RRR, +S1/S2, no M/G/R  Resp: CTA B/L  GI: soft, NT/ND, NABS  Vascular: + LE lymphedema    LABS:      134<L>  |  100  |  34<H>  ----------------------------<  78  4.7   |  24  |  1.91<H>    Ca    8.6      2022 07:44  Phos  4.8       Mg     2.20     04-06    TPro  6.0  /  Alb  2.9<L>  /  TBili  0.9  /  DBili      /  AST  30  /  ALT  28  /  AlkPhos  66      Creatinine Trend: 1.91 <--, 1.84 <--, 1.64 <--, 1.64 <--, 1.63 <--, 1.65 <--, 1.83 <--                        9.2    5.80  )-----------( 273      ( 2022 07:44 )             27.2     Urine Studies:  Urinalysis Basic - ( 2022 07:00 )    Color: Light Yellow / Appearance: Clear / S.010 / pH:   Gluc:  / Ketone: Negative  / Bili: Negative / Urobili: <2 mg/dL   Blood:  / Protein: 100 mg/dL / Nitrite: Negative   Leuk Esterase: Negative / RBC: 5 /HPF / WBC 1 /HPF   Sq Epi:  / Non Sq Epi: 0 /HPF / Bacteria: Negative      Creatinine, Random Urine: 94 mg/dL ( @ 07:00)  Protein/Creatinine Ratio Calculation: 1.2 Ratio ( @ 07:00)  Sodium, Random Urine: 95 mmol/L ( @ 22:33)  Creatinine, Random Urine: 62 mg/dL ( @ 22:33)      RADIOLOGY & ADDITIONAL STUDIES:    < from:  Kidney and Bladder (22 @ 12:24) >  FINDINGS:  Right kidney: 11.5 cm. No renal mass, hydronephrosis or calculi.    Left kidney: 11.8 cm. No renal mass, hydronephrosis or calculi.    Urinary bladder: Within normal limits.    IMPRESSION:  No hydronephrosis.        --- End of Report ---    < end of copied text >

## 2022-04-06 NOTE — PROGRESS NOTE ADULT - ASSESSMENT
32y Male with history of HTN presents with SOB transferred for cardiac cath. Nephrology consulted for elevated Scr.    1) SUSANA: likely hemodynamically mediated in setting of CRS? Scr mildly increased today for which diuretics changed to oral. UA with microscopic hematuria and subnephrotic range proteinuria. FeUrea low. Renal US negative for obstruction. Avoid nephrotoxins.    2) CKD Unspecified: Patient denies h/o CKD? but will need to confirm baseline with PMD (Dr. Luís Mast in [place]). Suspect patient with underlying diabetic nephropathy given microscopic hematuria and subnephrotic range proteinuria and large kidneys on renal US. Check proteinuria and GN serologies. Patient advised to undergo kidney biopsy to rule out alternate etiology of kidney disease. Patient agreeable. Would contact IR for kidney biopsy. Monitor electrolytes.    3) HTN with CKD: BP improving. Continue with current medications. Will hold lisinopril if Scr continues to increase. Monitor BP.    4) LE edema: Improving. Do not suspect lymphedema will improve significantly with diuresis. Continue with bumex 1 mg PO daily for maintenance. TTE with moderate global LVSD and diastolic dysfunction. Monitor UO.    [facility] NEPHROLOGY  Han Choudhury M.D.  Mekhi Verdin D.O.  Camille Brown M.D.  Lorena Wesley, MSN, ANP-C    Telephone: [phone]  Facsimile: [phone]    [address] [place] [address]

## 2022-04-06 NOTE — PROVIDER CONTACT NOTE (MEDICATION) - ASSESSMENT
pt hard stick   unable to obtain labs ordered by renal at this time   vein finder utilized and multiple nurses at bedside to attempt blood draw

## 2022-04-06 NOTE — PROGRESS NOTE ADULT - ASSESSMENT
32M with Autism, HTN, poorly controlled DM (A1c=9.4), lymphedema since age 12.  Developed SOB/cough just before take off on a flight to [place].  He has been non-adherent to his medications and was noted to have hypertension.  ALso, noted increased weight.  [facility]  Eventually, he was transferred to [facility] for cardiac cath.  Cath canceled due to new phlebitis which has resolved.  SUSANA    Phlebitis  - resolved  - blood cultures remain negative  - no antibiotics    SUSANA  - for possible kidney biopsy    Please call Infectious Diseases if there is a change in status.  Thank you.  [phone].

## 2022-04-06 NOTE — PROGRESS NOTE ADULT - SUBJECTIVE AND OBJECTIVE BOX
Patient is a 32y old  Male who presents with a chief complaint of SOB (2022 15:43)    PATIENT IS SEEN AND EXAMINED IN MEDICAL FLOOR.  NGT [    ]    SMALL [   ]      GT [   ]    ALLERGIES:  Keflex (Hives)      Daily     Daily Weight in k (2022 05:40)    VITALS:    Vital Signs Last 24 Hrs  T(C): 36.8 (2022 05:40), Max: 36.8 (2022 21:00)  T(F): 98.2 (2022 05:40), Max: 98.3 (2022 21:00)  HR: 86 (2022 05:40) (80 - 90)  BP: 140/89 (2022 05:40) (119/60 - 154/90)  BP(mean): --  RR: 18 (2022 05:40) (18 - 18)  SpO2: 99% (2022 05:40) (98% - 100%)    LABS:    CBC Full  -  ( 2022 07:44 )  WBC Count : 5.80 K/uL  RBC Count : 2.92 M/uL  Hemoglobin : 9.2 g/dL  Hematocrit : 27.2 %  Platelet Count - Automated : 273 K/uL  Mean Cell Volume : 93.2 fL  Mean Cell Hemoglobin : 31.5 pg  Mean Cell Hemoglobin Concentration : 33.8 gm/dL  Auto Neutrophil # : x  Auto Lymphocyte # : x  Auto Monocyte # : x  Auto Eosinophil # : x  Auto Basophil # : x  Auto Neutrophil % : x  Auto Lymphocyte % : x  Auto Monocyte % : x  Auto Eosinophil % : x  Auto Basophil % : x      04-06    134<L>  |  100  |  34<H>  ----------------------------<  78  4.7   |  24  |  1.91<H>    Ca    8.6      2022 07:44  Phos  4.8     04-06  Mg     2.20     04-06    TPro  6.0  /  Alb  2.9<L>  /  TBili  0.9  /  DBili  x   /  AST  30  /  ALT  28  /  AlkPhos  66  04-05    CAPILLARY BLOOD GLUCOSE  89 (2022 08:30)  106 (2022 22:58)  108 (2022 21:50)  89 (2022 21:20)  129 (2022 17:20)  84 (2022 13:20)            LIVER FUNCTIONS - ( 2022 16:12 )  Alb: 2.9 g/dL / Pro: 6.0 g/dL / ALK PHOS: 66 U/L / ALT: 28 U/L / AST: 30 U/L / GGT: x           Creatinine Trend: 1.91<--, 1.84<--, 1.64<--, 1.64<--, 1.63<--, 1.65<--  I&O's Summary    2022 07:01  -  2022 07:00  --------------------------------------------------------  IN: 1400 mL / OUT: 2800 mL / NET: -1400 mL    2022 07:01  -  2022 10:59  --------------------------------------------------------  IN: 200 mL / OUT: 600 mL / NET: -400 mL            .Blood Blood-Peripheral  -04 @ 22:49   No growth to date.  --  --          MEDICATIONS:    MEDICATIONS  (STANDING):  atorvastatin 40 milliGRAM(s) Oral at bedtime  buMETAnide 1 milliGRAM(s) Oral daily  carvedilol 6.25 milliGRAM(s) Oral every 12 hours  chlorhexidine 2% Cloths 1 Application(s) Topical daily  dextrose 5%. 1000 milliLiter(s) (100 mL/Hr) IV Continuous <Continuous>  dextrose 5%. 1000 milliLiter(s) (50 mL/Hr) IV Continuous <Continuous>  dextrose 50% Injectable 25 Gram(s) IV Push once  dextrose 50% Injectable 12.5 Gram(s) IV Push once  dextrose 50% Injectable 25 Gram(s) IV Push once  glucagon  Injectable 1 milliGRAM(s) IntraMuscular once  heparin   Injectable 5000 Unit(s) SubCutaneous every 12 hours  hydrALAZINE 25 milliGRAM(s) Oral three times a day  insulin glargine Injectable (LANTUS) 40 Unit(s) SubCutaneous at bedtime  insulin lispro (ADMELOG) corrective regimen sliding scale   SubCutaneous three times a day before meals  insulin lispro (ADMELOG) corrective regimen sliding scale   SubCutaneous at bedtime  insulin lispro Injectable (ADMELOG) 15 Unit(s) SubCutaneous three times a day before meals  lisinopril 40 milliGRAM(s) Oral daily  sodium chloride 0.9% lock flush 3 milliLiter(s) IV Push every 8 hours      MEDICATIONS  (PRN):  dextrose Oral Gel 15 Gram(s) Oral once PRN Blood Glucose LESS THAN 70 milliGRAM(s)/deciliter      REVIEW OF SYSTEMS:                           ALL ROS DONE [ X   ]    CONSTITUTIONAL:  LETHARGIC [   ], FEVER [   ], UNRESPONSIVE [   ]  CVS:  CP  [   ], SOB, [   ], PALPITATIONS [   ], DIZZYNESS [   ]  RS: COUGH [   ], SPUTUM [   ]  GI: ABDOMINAL PAIN [   ], NAUSEA [   ], VOMITINGS [   ], DIARRHEA [   ], CONSTIPATION [   ]  :  DYSURIA [   ], NOCTURIA [   ], INCREASED FREQUENCY [   ], DRIBLING [   ],  SKELETAL: PAINFUL JOINTS [   ], SWOLLEN JOINTS [   ], NECK ACHE [   ], LOW BACK ACHE [   ],  SKIN : ULCERS [   ], RASH [   ], ITCHING [   ]  CNS: HEAD ACHE [   ], DOUBLE VISION [   ], BLURRED VISION [   ], AMS / CONFUSION [   ], SEIZURES [   ], WEAKNESS [   ],TINGLING / NUMBNESS [   ]    PHYSICAL EXAMINATION:  GENERAL APPEARANCE: NO DISTRESS  HEENT:  NO PALLOR, NO  JVD,  NO   NODES, NECK SUPPLE  CVS: S1 +, S2 +,   RS: AEEB,  OCCASIONAL  RALES +,   NO RONCHI  ABD: SOFT, NT, NO, BS +  EXT: PE ++    LEFT FOREARM W/ ? SMALL WOUND DISTAL TO ANTECUBITAL FOSSA [UNABLE TO EXPRESS DISCHARGE ]  SKIN: WARM,   SKELETAL:  ROM ACCEPTABLE  CNS:  AAO X 3    RADIOLOGY :    ACC: [id number] EXAM:  US DPLX LWR EXT VEINS COMPL BI                          PROCEDURE DATE:  2022          INTERPRETATION:  CLINICAL INFORMATION: Shortness of breath. Diabetes.    COMPARISON: None available.    TECHNIQUE: Duplex sonography of the BILATERAL LOWER extremity veins with   color and spectral Doppler, with and without compression. The examination   is technically limited secondary to subcutaneous edema in the calves and   body habitus.    FINDINGS:    RIGHT:  Normal compressibility of the RIGHT common femoral, femoral and popliteal   veins.  Doppler examination shows normal spontaneous and phasic flow.  Calf veins not seen.    Subcutaneous edema in the calf.    LEFT:  Normal compressibility of the LEFT common femoral, femoral and popliteal   veins.  Doppler examination shows normal spontaneous and phasic flow.  Calf veins not seen.    Subcutaneous edema in the calf.    IMPRESSION:    Technically limited study including nonvisualization of the calf veins   with no evidence of deep venous thrombosis in the visualized bilateral   lower extremity veins.    =========================    ACC: [id number] EXAM:  NM PULM VENTILATION PERFUS IMG                          PROCEDURE DATE:  2022          INTERPRETATION:  CLINICAL INFORMATION: 32 year old male with dyspnea,   elevated serum creatinine level; referred to evaluate for pulmonary   embolism.    RADIOPHARMACEUTICAL: 1.0 mCi Tc-99m-DTPA via inhalation; 6.2 mCi   Tc-99m-MAA, I.V.    TECHNIQUE:  Ventilation and perfusion images of the lungs were obtained   following administration of Tc-99m-DTPA and Tc-99m-MAA. Images were   obtained in the anterior, posterior, both lateral, and all 4 oblique   projections. The study was interpreted in conjunction with a chest   radiograph of 3/31/2022.    COMPARISON: No prior lung V/Q scan.    FINDINGS: There is heterogeneous distribution of radiopharmaceutical in   the lungs on the ventilation and perfusion images. There are no segmental   perfusion defects.    IMPRESSION: Very low probability of pulmonary embolus.      ASSESSMENT :       PLAN:  HPI:  32 year old male with Autism (without limitations of functionality and intellect; able to work and sign his own consent per mother who is at bedside)HTN, diabetes mellitus type 2, lymphedema since age 12 that was recently diagnosed who presented to [facility] ED 3/31/22 complaining of SOB and cough x1week. Patient states that he has not taken his Lantus 35units suc bedtime/Lispro 15unites with meals or Lisinopril 40mg po daily as he ran out of refills and has been waiting for an appointment.  He has noted increased abdominal weight with tighter pants along with SOB walking (can not quantify).  Patient states he was about to take off on a flight to [place] when he started having SOB and a cough with productive of clear sputum while on the airplane on the tarmac.  Flight crew activated EMS and patient was BIBA to [facility]. Patient R/O MI with negative troponins. Labs were remarkable for Cr 1.83 and BNP was 2900 for which he was started on IV Lasix diuresis for acute systolic and diastolic CHF and Ddimer 335 with unremarkable LE dopplers and V/Q scan with very low probability of PE. Denies chest pain, fever, chills, abd pain, N/V/D.   Renal consulted for elevated Cr, likely due to DM2. Started on Mucomyst 1200mg po bid x4 doses in anticipation for cath.  Underwent an transthoracic Echo revealing EF 40-45%, mod global LV systolic dysfunction, mod concentric LV hypertrophy, sev dil LA, no sig valvular disease.   In light of patients cardiac risk factors, symptoms and abnormal noninvasive test findings the patient is now transferred to [facility] 22 for a cardiac catheterization to evaluate for ischemic etiology of CHF.   MEDS at [facility]: Coreg 3.125mg po BID, ASA 81mg po daily, Lipitor 40mg po daily, lisinopril 40mg po daily, Lantus 40units subcutaneous bedtime, lasix 40mg IV BID, Lispro 15units subc TID meals    Denies fever, cough, chills, headache, flu like symptoms, sick contact or recent travel  COVID PCR not detected on 4/3/22    UA with protein and small blood. Check spot Upr/Cr3  Pt will eventually benefit from ACEi/ARB due to proteinuria and HF, once renal function is stable (post cath).    (2022 14:13)    # CASE D/W MOTHER, ALBERTO SIMMONS @ BEDSIDE[] - ALL QUESTIONS ANSWERED    # ? LEFT FOREARM PHLEBITIS   - F/U BCX, MONITOR SITE  - WARM COMPRESSES, ARM ELEVATION  - ID CONSULT IN PROGRESS    # SUSPECT NEW ONSET CHF - SYSTOLIC AND DIASTOLIC DYSFUNCTION  # HYPERTENSIVE URGENCY, HTN  # B/L LE EDEMA - SUSPECT S/T CHF AND ?VARICOSE VEINS  - MONITOR ON TELEMETRY, TRENDED TROPONINS  - LASIX, STATIN, COREG  - REVIEWED TSH/LIPID PANEL/HBA1C  - CARDIOLOGY CONSULT    - ECHO - MILD MR, DILATED LA, CONCENTRIC LVH, GLOBAL LV SYSTOLIC DYSFUNCTION, G1DD  - TRANSFERRED TO [facility] FOR CARDIAC CATH    # ELEVATED D-DIMER  - V/Q SCAN - LOW PROBABILITY PE  - DOPPLER LE - NEGATIVE FOR DVT    # SUSANA VS. CKD - REVIEWED UA, F/U BLADDER SCAN, MONITOR CR, AVOID NEPHROTOXIC AGENTS  - NEPHROLOGY CONSULT IN PROGRESS    # SUSPECT CAMERON  - OUTPATIENT SLEEP STUDY    # MORBID OBESITY   - NUTRITION CONSULT    # DM2, NONCOMPLIANT W/ INSULIN - HBA1C 9.4 - LANTUS, SSI + FS    # HLD - STARTED STATIN, REVIEWED LIPID PANEL    # GI AND DVT PPX    Patient is a 32y old  Male who presents with a chief complaint of SOB (2022 15:43)    PATIENT IS SEEN AND EXAMINED IN MEDICAL FLOOR.  NGT [    ]    SMALL [   ]      GT [   ]    ALLERGIES:  Keflex (Hives)      Daily     Daily Weight in k (2022 05:40)    VITALS:    Vital Signs Last 24 Hrs  T(C): 36.8 (2022 05:40), Max: 36.8 (2022 21:00)  T(F): 98.2 (2022 05:40), Max: 98.3 (2022 21:00)  HR: 86 (2022 05:40) (80 - 90)  BP: 140/89 (2022 05:40) (119/60 - 154/90)  BP(mean): --  RR: 18 (2022 05:40) (18 - 18)  SpO2: 99% (2022 05:40) (98% - 100%)    LABS:    CBC Full  -  ( 2022 07:44 )  WBC Count : 5.80 K/uL  RBC Count : 2.92 M/uL  Hemoglobin : 9.2 g/dL  Hematocrit : 27.2 %  Platelet Count - Automated : 273 K/uL  Mean Cell Volume : 93.2 fL  Mean Cell Hemoglobin : 31.5 pg  Mean Cell Hemoglobin Concentration : 33.8 gm/dL  Auto Neutrophil # : x  Auto Lymphocyte # : x  Auto Monocyte # : x  Auto Eosinophil # : x  Auto Basophil # : x  Auto Neutrophil % : x  Auto Lymphocyte % : x  Auto Monocyte % : x  Auto Eosinophil % : x  Auto Basophil % : x      04-06    134<L>  |  100  |  34<H>  ----------------------------<  78  4.7   |  24  |  1.91<H>    Ca    8.6      2022 07:44  Phos  4.8     04-06  Mg     2.20     04-06    TPro  6.0  /  Alb  2.9<L>  /  TBili  0.9  /  DBili  x   /  AST  30  /  ALT  28  /  AlkPhos  66  04-05    CAPILLARY BLOOD GLUCOSE  89 (2022 08:30)  106 (2022 22:58)  108 (2022 21:50)  89 (2022 21:20)  129 (2022 17:20)  84 (2022 13:20)            LIVER FUNCTIONS - ( 2022 16:12 )  Alb: 2.9 g/dL / Pro: 6.0 g/dL / ALK PHOS: 66 U/L / ALT: 28 U/L / AST: 30 U/L / GGT: x           Creatinine Trend: 1.91<--, 1.84<--, 1.64<--, 1.64<--, 1.63<--, 1.65<--  I&O's Summary    2022 07:01  -  2022 07:00  --------------------------------------------------------  IN: 1400 mL / OUT: 2800 mL / NET: -1400 mL    2022 07:01  -  2022 10:59  --------------------------------------------------------  IN: 200 mL / OUT: 600 mL / NET: -400 mL            .Blood Blood-Peripheral  -04 @ 22:49   No growth to date.  --  --          MEDICATIONS:    MEDICATIONS  (STANDING):  atorvastatin 40 milliGRAM(s) Oral at bedtime  buMETAnide 1 milliGRAM(s) Oral daily  carvedilol 6.25 milliGRAM(s) Oral every 12 hours  chlorhexidine 2% Cloths 1 Application(s) Topical daily  dextrose 5%. 1000 milliLiter(s) (100 mL/Hr) IV Continuous <Continuous>  dextrose 5%. 1000 milliLiter(s) (50 mL/Hr) IV Continuous <Continuous>  dextrose 50% Injectable 25 Gram(s) IV Push once  dextrose 50% Injectable 12.5 Gram(s) IV Push once  dextrose 50% Injectable 25 Gram(s) IV Push once  glucagon  Injectable 1 milliGRAM(s) IntraMuscular once  heparin   Injectable 5000 Unit(s) SubCutaneous every 12 hours  hydrALAZINE 25 milliGRAM(s) Oral three times a day  insulin glargine Injectable (LANTUS) 40 Unit(s) SubCutaneous at bedtime  insulin lispro (ADMELOG) corrective regimen sliding scale   SubCutaneous three times a day before meals  insulin lispro (ADMELOG) corrective regimen sliding scale   SubCutaneous at bedtime  insulin lispro Injectable (ADMELOG) 15 Unit(s) SubCutaneous three times a day before meals  lisinopril 40 milliGRAM(s) Oral daily  sodium chloride 0.9% lock flush 3 milliLiter(s) IV Push every 8 hours      MEDICATIONS  (PRN):  dextrose Oral Gel 15 Gram(s) Oral once PRN Blood Glucose LESS THAN 70 milliGRAM(s)/deciliter      REVIEW OF SYSTEMS:                           ALL ROS DONE [ X   ]    CONSTITUTIONAL:  LETHARGIC [   ], FEVER [   ], UNRESPONSIVE [   ]  CVS:  CP  [   ], SOB, [   ], PALPITATIONS [   ], DIZZYNESS [   ]  RS: COUGH [   ], SPUTUM [   ]  GI: ABDOMINAL PAIN [   ], NAUSEA [   ], VOMITINGS [   ], DIARRHEA [   ], CONSTIPATION [   ]  :  DYSURIA [   ], NOCTURIA [   ], INCREASED FREQUENCY [   ], DRIBLING [   ],  SKELETAL: PAINFUL JOINTS [   ], SWOLLEN JOINTS [   ], NECK ACHE [   ], LOW BACK ACHE [   ],  SKIN : ULCERS [   ], RASH [   ], ITCHING [   ]  CNS: HEAD ACHE [   ], DOUBLE VISION [   ], BLURRED VISION [   ], AMS / CONFUSION [   ], SEIZURES [   ], WEAKNESS [   ],TINGLING / NUMBNESS [   ]    PHYSICAL EXAMINATION:  GENERAL APPEARANCE: NO DISTRESS  HEENT:  NO PALLOR, NO  JVD,  NO   NODES, NECK SUPPLE  CVS: S1 +, S2 +,   RS: AEEB,  OCCASIONAL  RALES +,   NO RONCHI  ABD: SOFT, NT, NO, BS +  EXT: PE ++    LEFT FOREARM W/ ? SMALL WOUND DISTAL TO ANTECUBITAL FOSSA [UNABLE TO EXPRESS DISCHARGE ]  SKIN: WARM,   SKELETAL:  ROM ACCEPTABLE  CNS:  AAO X 3    RADIOLOGY :    ACC: [id number] EXAM:  US DPLX LWR EXT VEINS COMPL BI                          PROCEDURE DATE:  2022          INTERPRETATION:  CLINICAL INFORMATION: Shortness of breath. Diabetes.    COMPARISON: None available.    TECHNIQUE: Duplex sonography of the BILATERAL LOWER extremity veins with   color and spectral Doppler, with and without compression. The examination   is technically limited secondary to subcutaneous edema in the calves and   body habitus.    FINDINGS:    RIGHT:  Normal compressibility of the RIGHT common femoral, femoral and popliteal   veins.  Doppler examination shows normal spontaneous and phasic flow.  Calf veins not seen.    Subcutaneous edema in the calf.    LEFT:  Normal compressibility of the LEFT common femoral, femoral and popliteal   veins.  Doppler examination shows normal spontaneous and phasic flow.  Calf veins not seen.    Subcutaneous edema in the calf.    IMPRESSION:    Technically limited study including nonvisualization of the calf veins   with no evidence of deep venous thrombosis in the visualized bilateral   lower extremity veins.    =========================    ACC: [id number] EXAM:  NM PULM VENTILATION PERFUS IMG                          PROCEDURE DATE:  2022          INTERPRETATION:  CLINICAL INFORMATION: 32 year old male with dyspnea,   elevated serum creatinine level; referred to evaluate for pulmonary   embolism.    RADIOPHARMACEUTICAL: 1.0 mCi Tc-99m-DTPA via inhalation; 6.2 mCi   Tc-99m-MAA, I.V.    TECHNIQUE:  Ventilation and perfusion images of the lungs were obtained   following administration of Tc-99m-DTPA and Tc-99m-MAA. Images were   obtained in the anterior, posterior, both lateral, and all 4 oblique   projections. The study was interpreted in conjunction with a chest   radiograph of 3/31/2022.    COMPARISON: No prior lung V/Q scan.    FINDINGS: There is heterogeneous distribution of radiopharmaceutical in   the lungs on the ventilation and perfusion images. There are no segmental   perfusion defects.    IMPRESSION: Very low probability of pulmonary embolus.      ASSESSMENT :       PLAN:  HPI:  32 year old male with Autism (without limitations of functionality and intellect; able to work and sign his own consent per mother who is at bedside)HTN, diabetes mellitus type 2, lymphedema since age 12 that was recently diagnosed who presented to [facility] ED 3/31/22 complaining of SOB and cough x1week. Patient states that he has not taken his Lantus 35units suc bedtime/Lispro 15unites with meals or Lisinopril 40mg po daily as he ran out of refills and has been waiting for an appointment.  He has noted increased abdominal weight with tighter pants along with SOB walking (can not quantify).  Patient states he was about to take off on a flight to [place] when he started having SOB and a cough with productive of clear sputum while on the airplane on the tarmac.  Flight crew activated EMS and patient was BIBA to [facility]. Patient R/O MI with negative troponins. Labs were remarkable for Cr 1.83 and BNP was 2900 for which he was started on IV Lasix diuresis for acute systolic and diastolic CHF and Ddimer 335 with unremarkable LE dopplers and V/Q scan with very low probability of PE. Denies chest pain, fever, chills, abd pain, N/V/D.   Renal consulted for elevated Cr, likely due to DM2. Started on Mucomyst 1200mg po bid x4 doses in anticipation for cath.  Underwent an transthoracic Echo revealing EF 40-45%, mod global LV systolic dysfunction, mod concentric LV hypertrophy, sev dil LA, no sig valvular disease.   In light of patients cardiac risk factors, symptoms and abnormal noninvasive test findings the patient is now transferred to [facility] 22 for a cardiac catheterization to evaluate for ischemic etiology of CHF.   MEDS at [facility]: Coreg 3.125mg po BID, ASA 81mg po daily, Lipitor 40mg po daily, lisinopril 40mg po daily, Lantus 40units subcutaneous bedtime, lasix 40mg IV BID, Lispro 15units subc TID meals    Denies fever, cough, chills, headache, flu like symptoms, sick contact or recent travel  COVID PCR not detected on 4/3/22    UA with protein and small blood. Check spot Upr/Cr3  Pt will eventually benefit from ACEi/ARB due to proteinuria and HF, once renal function is stable (post cath).    (2022 14:13)    # CASE D/W MOTHER, ALBERTO SIMMONS @ BEDSIDE [] - ALL QUESTIONS ANSWERED    # ? LEFT FOREARM PHLEBITIS   - BCX [NGTD], MONITOR SITE  - WARM COMPRESSES, ARM ELEVATION  - ID CONSULT IN PROGRESS    # SUSPECT NEW ONSET CHF - SYSTOLIC AND DIASTOLIC DYSFUNCTION  # HYPERTENSIVE URGENCY, HTN  # B/L LE EDEMA - SUSPECT S/T CHF AND ?VARICOSE VEINS  - MONITOR ON TELEMETRY, TRENDED TROPONINS  - LASIX, STATIN, COREG  - REVIEWED TSH/LIPID PANEL/HBA1C  - CARDIOLOGY CONSULT    - ECHO - MILD MR, DILATED LA, CONCENTRIC LVH, GLOBAL LV SYSTOLIC DYSFUNCTION, G1DD  - TRANSFERRED TO [facility] FOR CARDIAC CATH    # ELEVATED D-DIMER  - V/Q SCAN - LOW PROBABILITY PE  - DOPPLER LE - NEGATIVE FOR DVT    # SUSANA VS. CKD - REVIEWED UA, MONITOR CR, AVOID NEPHROTOXIC AGENTS  - REVIEWED RENAL U/S  - NEPHROLOGY CONSULT IN PROGRESS    - F/U RENAL BX    # SUSPECT CAMERON  - OUTPATIENT SLEEP STUDY    # MORBID OBESITY   - NUTRITION CONSULT    # DM2, NONCOMPLIANT W/ INSULIN - HBA1C 9.4 - LANTUS, SSI + FS    # HLD - STARTED STATIN, REVIEWED LIPID PANEL    # GI AND DVT PPX

## 2022-04-06 NOTE — DIETITIAN INITIAL EVALUATION ADULT - ORAL INTAKE PTA/DIET HISTORY
Patient reports good appetite PTA, recently with increased appetite/PO intake (with snacks in between meals), does not follow therapeutic diet at home. NKFA per patient.

## 2022-04-06 NOTE — DIETITIAN INITIAL EVALUATION ADULT - PERTINENT MEDS FT
MEDICATIONS  (STANDING):  acetylcysteine  Oral Solution 1200 milliGRAM(s) Oral every 12 hours  atorvastatin 40 milliGRAM(s) Oral at bedtime  carvedilol 6.25 milliGRAM(s) Oral every 12 hours  chlorhexidine 2% Cloths 1 Application(s) Topical daily  dextrose 5%. 1000 milliLiter(s) (100 mL/Hr) IV Continuous <Continuous>  dextrose 5%. 1000 milliLiter(s) (50 mL/Hr) IV Continuous <Continuous>  dextrose 50% Injectable 25 Gram(s) IV Push once  dextrose 50% Injectable 12.5 Gram(s) IV Push once  dextrose 50% Injectable 25 Gram(s) IV Push once  glucagon  Injectable 1 milliGRAM(s) IntraMuscular once  heparin   Injectable 5000 Unit(s) SubCutaneous every 12 hours  hydrALAZINE 25 milliGRAM(s) Oral three times a day  insulin glargine Injectable (LANTUS) 40 Unit(s) SubCutaneous at bedtime  insulin lispro (ADMELOG) corrective regimen sliding scale   SubCutaneous three times a day before meals  insulin lispro (ADMELOG) corrective regimen sliding scale   SubCutaneous at bedtime  insulin lispro Injectable (ADMELOG) 15 Unit(s) SubCutaneous three times a day before meals  lisinopril 40 milliGRAM(s) Oral daily  sodium chloride 0.9% lock flush 3 milliLiter(s) IV Push every 8 hours    MEDICATIONS  (PRN):  dextrose Oral Gel 15 Gram(s) Oral once PRN Blood Glucose LESS THAN 70 milliGRAM(s)/deciliter

## 2022-04-06 NOTE — PROGRESS NOTE ADULT - SUBJECTIVE AND OBJECTIVE BOX
Patient is a 32y old  Male who presents with a chief complaint of shortness of breath (2022 14:13)    f/u phlebitis    Interval History/ROS:  no fever.  BC are negative.  no LUE pain.  no dysuria.  Remainder of ROS otherwise negative.    PAST MEDICAL & SURGICAL HISTORY:  Diabetes mellitus (A1c=9.4)  Hypertension  Acute combined systolic and diastolic congestive heart failure  Morbidly obese  Lymphedema  Autism (per chart w/o limitations of functionality and intellect; able to work and sign his own consent per chart)    Allergies  Keflex (Hives)    ANTIMICROBIALS:  none    MEDICATIONS  (STANDING):  atorvastatin 40 at bedtime  carvedilol 6.25 every 12 hours  heparin   Injectable 5000 every 12 hours  hydrALAZINE 25 three times a day  insulin glargine Injectable (LANTUS) 40 at bedtime  insulin lispro (ADMELOG) corrective regimen sliding scale  three times a day before meals  insulin lispro (ADMELOG) corrective regimen sliding scale  at bedtime  insulin lispro Injectable (ADMELOG) 15 three times a day before meals  lisinopril 40 daily    Vital Signs Last 24 Hrs  T(F): 98.2 (22 @ 05:40), Max: 98.3 (22 @ 21:00)  HR: 86 (22 @ 05:40)  BP: 140/89 (22 @ 05:40)  RR: 18 (22 @ 05:40)  SpO2: 99% (22 @ 05:40) (98% - 99%)    PHYSICAL EXAM:  Constitutional: non-toxic, no distress  HEAD/EYES: anicteric  ENT:  supple  Cardiovascular:  no tachycardia  Respiratory:  not tachypneic  GI:  soft, non-tender, normal bowel sounds  :  no qureshi  Musculoskeletal:  no synovitis  Neurologic: awake and alert, no focal findings  Skin:  left antecubital fossa phlebitis resolved  Psychiatric:  awake, alert, appropriate mood                                         9.2    5.80  )-----------( 273      ( 2022 07:44 )             27.2     134  |  100  |  34  ----------------------------<  78  4.7   |  24  |  1.91  Ca    8.6      2022 07:44Phos  4.8     04-06Mg     2.20     -06  TPro  6.0  /  Alb  2.9  /  TBili  0.9  /  DBili  x   /  AST  30  /  ALT  28  /  AlkPhos  66  -    Creatinine, Serum: 1.91 (-)  Creatinine, Serum: 1.84 (-)  Creatinine, Serum: 1.64 (-)  Creatinine, Serum: 1.64 ()  Creatinine, Serum: 1.63 ()  Creatinine, Serum: 1.65 ()  Creatinine, Serum: 1.83 ()    Urinalysis Basic - ( 2022 22:34 )  Color: Yellow / Appearance: Clear / S.015 / pH: x  Gluc: x / Ketone: Negative  / Bili: Negative / Urobili: Negative   Blood: x / Protein: 100 / Nitrite: Negative   Leuk Esterase: Negative / RBC: 5-10 /HPF / WBC 0-2 /HPF   Sq Epi: x / Non Sq Epi: Occasional /HPF / Bacteria: Trace /HPF    MICROBIOLOGY:  Culture - Blood (collected 22 @ 22:49)  Source: .Blood Blood-Venous  Preliminary Report (22 @ 23:01):    No growth to date.    Culture - Blood (collected 22 @ 22:49)  Source: .Blood Blood-Peripheral  Preliminary Report (22 @ 23:01):    No growth to date.    Rapid RVP Result: NotDetec ( @ 13:37)    RADIOLOGY:  imaging below personally reviewed and agree with findings    US Kidney and Bladder (22 @ 12:24) >  IMPRESSION:  No hydronephrosis.    US Duplex Venous Lower Ext Complete, Bilateral (22 @ 11:24) >  IMPRESSION:  Technically limited study including nonvisualization of the calf veins with no evidence of deep venous thrombosis in the visualized bilateral lower extremity veins.    NM Pulmonary Ventilation/Perfusion Scan (22 @ 10:10) >  IMPRESSION: Very low probability of pulmonary embolus.    Xray Chest 1 View- PORTABLE-Urgent (22 @ 13:22) >  IMPRESSION: No definite infiltrate.    Transthoracic Echocardiogram (22 @ 07:17) >  Ejection Fraction Visual Estimate: 40-45 %  CONCLUSIONS:  1. Normal mitral valve. Mild mitral regurgitation.  2. Normal trileaflet aortic valve.  3. Aortic Root: 3.7 cm.  4. Severely dilated left atrium.  LA volume index = 66 cc/m2.  5. Moderate concentric left ventricular hypertrophy.  6. Moderate global left ventricular systolic dysfunction.  7. Grade I diastolic dysfunction (Impaired relaxation, mild).  8. Normal right atrium.  9. Normal right ventricular size and systolic function (TAPSE  1.9cm).  10. Unable to estimate RVSP.  11. There is trace tricuspid regurgitation.  12. There is trace pulmonic regurgitation.  13. Small pericardial effusion.

## 2022-04-07 LAB
ANION GAP SERPL CALC-SCNC: 11 MMOL/L — SIGNIFICANT CHANGE UP (ref 7–14)
BLD GP AB SCN SERPL QL: NEGATIVE — SIGNIFICANT CHANGE UP
BUN SERPL-MCNC: 44 MG/DL — HIGH (ref 7–23)
CALCIUM SERPL-MCNC: 8.3 MG/DL — LOW (ref 8.4–10.5)
CHLORIDE SERPL-SCNC: 103 MMOL/L — SIGNIFICANT CHANGE UP (ref 98–107)
CO2 SERPL-SCNC: 22 MMOL/L — SIGNIFICANT CHANGE UP (ref 22–31)
CREAT SERPL-MCNC: 2.37 MG/DL — HIGH (ref 0.5–1.3)
EGFR: 36 ML/MIN/1.73M2 — LOW
FERRITIN SERPL-MCNC: 585 NG/ML — HIGH (ref 30–400)
FOLATE SERPL-MCNC: 7.6 NG/ML — SIGNIFICANT CHANGE UP (ref 3.1–17.5)
GLUCOSE BLDC GLUCOMTR-MCNC: 146 MG/DL — HIGH (ref 70–99)
GLUCOSE BLDC GLUCOMTR-MCNC: 148 MG/DL — HIGH (ref 70–99)
GLUCOSE BLDC GLUCOMTR-MCNC: 169 MG/DL — HIGH (ref 70–99)
GLUCOSE BLDC GLUCOMTR-MCNC: 242 MG/DL — HIGH (ref 70–99)
GLUCOSE BLDC GLUCOMTR-MCNC: 271 MG/DL — HIGH (ref 70–99)
GLUCOSE BLDC GLUCOMTR-MCNC: 86 MG/DL — SIGNIFICANT CHANGE UP (ref 70–99)
GLUCOSE SERPL-MCNC: 156 MG/DL — HIGH (ref 70–99)
HCT VFR BLD CALC: 26.6 % — LOW (ref 39–50)
HCT VFR BLD CALC: 27.8 % — LOW (ref 39–50)
HGB BLD-MCNC: 8.8 G/DL — LOW (ref 13–17)
HGB BLD-MCNC: 9.6 G/DL — LOW (ref 13–17)
HIV 1+2 AB+HIV1 P24 AG SERPL QL IA: SIGNIFICANT CHANGE UP
INTERPRETATION SERPL IFE-IMP: SIGNIFICANT CHANGE UP
IRON SATN MFR SERPL: 113 UG/DL — SIGNIFICANT CHANGE UP (ref 45–165)
IRON SATN MFR SERPL: 48 % — SIGNIFICANT CHANGE UP (ref 14–50)
KAPPA LC SER QL IFE: 9.92 MG/DL — HIGH (ref 0.33–1.94)
KAPPA/LAMBDA FREE LIGHT CHAIN RATIO, SERUM: 1.83 RATIO — HIGH (ref 0.26–1.65)
LAMBDA LC SER QL IFE: 5.43 MG/DL — HIGH (ref 0.57–2.63)
MAGNESIUM SERPL-MCNC: 2.2 MG/DL — SIGNIFICANT CHANGE UP (ref 1.6–2.6)
MCHC RBC-ENTMCNC: 31.3 PG — SIGNIFICANT CHANGE UP (ref 27–34)
MCHC RBC-ENTMCNC: 32.1 PG — SIGNIFICANT CHANGE UP (ref 27–34)
MCHC RBC-ENTMCNC: 33.1 GM/DL — SIGNIFICANT CHANGE UP (ref 32–36)
MCHC RBC-ENTMCNC: 34.5 GM/DL — SIGNIFICANT CHANGE UP (ref 32–36)
MCV RBC AUTO: 93 FL — SIGNIFICANT CHANGE UP (ref 80–100)
MCV RBC AUTO: 94.7 FL — SIGNIFICANT CHANGE UP (ref 80–100)
NRBC # BLD: 0 /100 WBCS — SIGNIFICANT CHANGE UP
NRBC # BLD: 0 /100 WBCS — SIGNIFICANT CHANGE UP
NRBC # FLD: 0 K/UL — SIGNIFICANT CHANGE UP
NRBC # FLD: 0 K/UL — SIGNIFICANT CHANGE UP
PHOSPHATE SERPL-MCNC: 4.2 MG/DL — SIGNIFICANT CHANGE UP (ref 2.5–4.5)
PLATELET # BLD AUTO: 271 K/UL — SIGNIFICANT CHANGE UP (ref 150–400)
PLATELET # BLD AUTO: 317 K/UL — SIGNIFICANT CHANGE UP (ref 150–400)
POTASSIUM SERPL-MCNC: 4.8 MMOL/L — SIGNIFICANT CHANGE UP (ref 3.5–5.3)
POTASSIUM SERPL-SCNC: 4.8 MMOL/L — SIGNIFICANT CHANGE UP (ref 3.5–5.3)
RBC # BLD: 2.81 M/UL — LOW (ref 4.2–5.8)
RBC # BLD: 2.99 M/UL — LOW (ref 4.2–5.8)
RBC # FLD: 11.9 % — SIGNIFICANT CHANGE UP (ref 10.3–14.5)
RBC # FLD: 11.9 % — SIGNIFICANT CHANGE UP (ref 10.3–14.5)
RH IG SCN BLD-IMP: POSITIVE — SIGNIFICANT CHANGE UP
SODIUM SERPL-SCNC: 136 MMOL/L — SIGNIFICANT CHANGE UP (ref 135–145)
TIBC SERPL-MCNC: 237 UG/DL — SIGNIFICANT CHANGE UP (ref 220–430)
UIBC SERPL-MCNC: 124 UG/DL — SIGNIFICANT CHANGE UP (ref 110–370)
WBC # BLD: 6.84 K/UL — SIGNIFICANT CHANGE UP (ref 3.8–10.5)
WBC # BLD: 8.2 K/UL — SIGNIFICANT CHANGE UP (ref 3.8–10.5)
WBC # FLD AUTO: 6.84 K/UL — SIGNIFICANT CHANGE UP (ref 3.8–10.5)
WBC # FLD AUTO: 8.2 K/UL — SIGNIFICANT CHANGE UP (ref 3.8–10.5)

## 2022-04-07 RX ORDER — INSULIN LISPRO 100/ML
9 VIAL (ML) SUBCUTANEOUS ONCE
Refills: 0 | Status: COMPLETED | OUTPATIENT
Start: 2022-04-07 | End: 2022-04-07

## 2022-04-07 RX ADMIN — Medication 15 UNIT(S): at 13:01

## 2022-04-07 RX ADMIN — LISINOPRIL 40 MILLIGRAM(S): 2.5 TABLET ORAL at 05:32

## 2022-04-07 RX ADMIN — Medication 1200 MILLIGRAM(S): at 05:32

## 2022-04-07 RX ADMIN — CHLORHEXIDINE GLUCONATE 1 APPLICATION(S): 213 SOLUTION TOPICAL at 13:02

## 2022-04-07 RX ADMIN — CARVEDILOL PHOSPHATE 6.25 MILLIGRAM(S): 80 CAPSULE, EXTENDED RELEASE ORAL at 18:23

## 2022-04-07 RX ADMIN — INSULIN GLARGINE 40 UNIT(S): 100 INJECTION, SOLUTION SUBCUTANEOUS at 22:15

## 2022-04-07 RX ADMIN — Medication 25 MILLIGRAM(S): at 22:15

## 2022-04-07 RX ADMIN — CARVEDILOL PHOSPHATE 6.25 MILLIGRAM(S): 80 CAPSULE, EXTENDED RELEASE ORAL at 05:33

## 2022-04-07 RX ADMIN — Medication 25 MILLIGRAM(S): at 13:02

## 2022-04-07 RX ADMIN — HEPARIN SODIUM 5000 UNIT(S): 5000 INJECTION INTRAVENOUS; SUBCUTANEOUS at 05:32

## 2022-04-07 RX ADMIN — SODIUM CHLORIDE 3 MILLILITER(S): 9 INJECTION INTRAMUSCULAR; INTRAVENOUS; SUBCUTANEOUS at 21:57

## 2022-04-07 RX ADMIN — SODIUM CHLORIDE 3 MILLILITER(S): 9 INJECTION INTRAMUSCULAR; INTRAVENOUS; SUBCUTANEOUS at 05:40

## 2022-04-07 RX ADMIN — Medication 9 UNIT(S): at 17:51

## 2022-04-07 RX ADMIN — SODIUM CHLORIDE 3 MILLILITER(S): 9 INJECTION INTRAMUSCULAR; INTRAVENOUS; SUBCUTANEOUS at 13:06

## 2022-04-07 RX ADMIN — Medication 25 MILLIGRAM(S): at 05:32

## 2022-04-07 RX ADMIN — HEPARIN SODIUM 5000 UNIT(S): 5000 INJECTION INTRAVENOUS; SUBCUTANEOUS at 18:24

## 2022-04-07 RX ADMIN — ATORVASTATIN CALCIUM 40 MILLIGRAM(S): 80 TABLET, FILM COATED ORAL at 22:15

## 2022-04-07 NOTE — PROVIDER CONTACT NOTE (OTHER) - BACKGROUND
Patient has diabetes mellitus type 2, had incidence of hypoglycemia this admission Patient has diabetes mellitus type 2

## 2022-04-07 NOTE — PROGRESS NOTE ADULT - SUBJECTIVE AND OBJECTIVE BOX
[facility] NEPHROLOGY- PROGRESS NOTE    32y Male with history of HTN presents with SOB transferred for cardiac cath. Nephrology consulted for elevated Scr.    REVIEW OF SYSTEMS:  Gen: no changes in weight  Cards: no chest pain  Resp: no dyspnea  GI: no nausea or vomiting or diarrhea  Vascular: + LE edema    Keflex ([facility])      Hospital Medications: Medications reviewed      VITALS:  T(F): 98.6 (22 @ 05:30), Max: 98.6 (22 @ 05:30)  HR: 93 (22 @ 05:30)  BP: 122/67 (22 @ 05:30)  RR: 18 (22 @ 05:30)  SpO2: 99% (22 @ 05:30)  Wt(kg): --     @ 07:01  -   @ 07:00  --------------------------------------------------------  IN: 600 mL / OUT: 1200 mL / NET: -600 mL          PHYSICAL EXAM:    Gen: NAD, calm  Cards: RRR, +S1/S2, no M/G/R  Resp: CTA B/L  GI: soft, NT/ND, NABS  Vascular: + LE lymphedema      LABS:      136  |  103  |  44<H>  ----------------------------<  156<H>  4.8   |  22  |  2.37<H>    Ca    8.3<L>      2022 07:41  Phos  4.2       Mg     2.20         TPro  5.8<L>  /  Alb      /  TBili      /  DBili      /  AST      /  ALT      /  AlkPhos          Creatinine Trend: 2.37 <--, 1.91 <--, 1.84 <--, 1.64 <--, 1.64 <--, 1.63 <--, 1.65 <--, 1.83 <--                        8.8    6.84  )-----------( 271      ( 2022 07:41 )             26.6     Urine Studies:  Urinalysis Basic - ( 2022 07:00 )    Color: Light Yellow / Appearance: Clear / S.010 / pH:   Gluc:  / Ketone: Negative  / Bili: Negative / Urobili: <2 mg/dL   Blood:  / Protein: 100 mg/dL / Nitrite: Negative   Leuk Esterase: Negative / RBC: 5 /HPF / WBC 1 /HPF   Sq Epi:  / Non Sq Epi: 0 /HPF / Bacteria: Negative      Creatinine, Random Urine: 94 mg/dL ( @ 07:00)  Protein/Creatinine Ratio Calculation: 1.2 Ratio ( @ 07:00)  Sodium, Random Urine: 95 mmol/L ( @ 22:33)  Creatinine, Random Urine: 62 mg/dL ( @ 22:33)

## 2022-04-07 NOTE — PROGRESS NOTE ADULT - SUBJECTIVE AND OBJECTIVE BOX
Patient is a 32y old  Male who presents with a chief complaint of SOB (2022 12:57)    PATIENT IS SEEN AND EXAMINED IN MEDICAL FLOOR.  NGT [    ]    SMALL [   ]      GT [   ]    ALLERGIES:  Keflex (Hives)      Daily     Daily Weight in k (2022 05:30)    VITALS:    Vital Signs Last 24 Hrs  T(C): 37 (2022 05:30), Max: 37 (2022 05:30)  T(F): 98.6 (2022 05:30), Max: 98.6 (2022 05:30)  HR: 93 (2022 05:30) (90 - 96)  BP: 122/67 (2022 05:30) (120/62 - 151/83)  BP(mean): --  RR: 18 (2022 05:30) (18 - 18)  SpO2: 99% (2022 05:30) (97% - 99%)    LABS:    CBC Full  -  ( 2022 07:41 )  WBC Count : 6.84 K/uL  RBC Count : 2.81 M/uL  Hemoglobin : 8.8 g/dL  Hematocrit : 26.6 %  Platelet Count - Automated : 271 K/uL  Mean Cell Volume : 94.7 fL  Mean Cell Hemoglobin : 31.3 pg  Mean Cell Hemoglobin Concentration : 33.1 gm/dL  Auto Neutrophil # : x  Auto Lymphocyte # : x  Auto Monocyte # : x  Auto Eosinophil # : x  Auto Basophil # : x  Auto Neutrophil % : x  Auto Lymphocyte % : x  Auto Monocyte % : x  Auto Eosinophil % : x  Auto Basophil % : x      -    136  |  103  |  44<H>  ----------------------------<  156<H>  4.8   |  22  |  2.37<H>    Ca    8.3<L>      2022 07:41  Phos  4.2     04-07  Mg     2.20     04-07    TPro  5.8<L>  /  Alb  x   /  TBili  x   /  DBili  x   /  AST  x   /  ALT  x   /  AlkPhos  x   04-06    CAPILLARY BLOOD GLUCOSE  148 (2022 08:34)  169 (2022 06:00)      POCT Blood Glucose.: 148 mg/dL (2022 08:34)  POCT Blood Glucose.: 169 mg/dL (2022 05:51)  POCT Blood Glucose.: 271 mg/dL (2022 01:00)  POCT Blood Glucose.: 178 mg/dL (2022 21:52)  POCT Blood Glucose.: 117 mg/dL (2022 17:09)  POCT Blood Glucose.: 135 mg/dL (2022 12:34)        LIVER FUNCTIONS - ( 2022 21:28 )  Alb: x     / Pro: 5.8 g/dL / ALK PHOS: x     / ALT: x     / AST: x     / GGT: x           Creatinine Trend: 2.37<--, 1.91<--, 1.84<--, 1.64<--, 1.64<--, 1.63<--  I&O's Summary    2022 07:01  -  2022 07:00  --------------------------------------------------------  IN: 600 mL / OUT: 1200 mL / NET: -600 mL            .Blood Blood-Peripheral  -04 @ 22:49   No growth to date.  --  --          MEDICATIONS:    MEDICATIONS  (STANDING):  acetylcysteine  Oral Solution 1200 milliGRAM(s) Oral every 12 hours  atorvastatin 40 milliGRAM(s) Oral at bedtime  carvedilol 6.25 milliGRAM(s) Oral every 12 hours  chlorhexidine 2% Cloths 1 Application(s) Topical daily  dextrose 5%. 1000 milliLiter(s) (100 mL/Hr) IV Continuous <Continuous>  dextrose 5%. 1000 milliLiter(s) (50 mL/Hr) IV Continuous <Continuous>  dextrose 50% Injectable 25 Gram(s) IV Push once  dextrose 50% Injectable 12.5 Gram(s) IV Push once  dextrose 50% Injectable 25 Gram(s) IV Push once  glucagon  Injectable 1 milliGRAM(s) IntraMuscular once  heparin   Injectable 5000 Unit(s) SubCutaneous every 12 hours  hydrALAZINE 25 milliGRAM(s) Oral three times a day  insulin glargine Injectable (LANTUS) 40 Unit(s) SubCutaneous at bedtime  insulin lispro (ADMELOG) corrective regimen sliding scale   SubCutaneous three times a day before meals  insulin lispro (ADMELOG) corrective regimen sliding scale   SubCutaneous at bedtime  insulin lispro Injectable (ADMELOG) 15 Unit(s) SubCutaneous three times a day before meals  sodium chloride 0.9% lock flush 3 milliLiter(s) IV Push every 8 hours      MEDICATIONS  (PRN):  dextrose Oral Gel 15 Gram(s) Oral once PRN Blood Glucose LESS THAN 70 milliGRAM(s)/deciliter      REVIEW OF SYSTEMS:                           ALL ROS DONE [ X   ]    CONSTITUTIONAL:  LETHARGIC [   ], FEVER [   ], UNRESPONSIVE [   ]  CVS:  CP  [   ], SOB, [   ], PALPITATIONS [   ], DIZZYNESS [   ]  RS: COUGH [   ], SPUTUM [   ]  GI: ABDOMINAL PAIN [   ], NAUSEA [   ], VOMITINGS [   ], DIARRHEA [   ], CONSTIPATION [   ]  :  DYSURIA [   ], NOCTURIA [   ], INCREASED FREQUENCY [   ], DRIBLING [   ],  SKELETAL: PAINFUL JOINTS [   ], SWOLLEN JOINTS [   ], NECK ACHE [   ], LOW BACK ACHE [   ],  SKIN : ULCERS [   ], RASH [   ], ITCHING [   ]  CNS: HEAD ACHE [   ], DOUBLE VISION [   ], BLURRED VISION [   ], AMS / CONFUSION [   ], SEIZURES [   ], WEAKNESS [   ],TINGLING / NUMBNESS [   ]    PHYSICAL EXAMINATION:  GENERAL APPEARANCE: NO DISTRESS  HEENT:  NO PALLOR, NO  JVD,  NO   NODES, NECK SUPPLE  CVS: S1 +, S2 +,   RS: AEEB,  OCCASIONAL  RALES +,   NO RONCHI  ABD: SOFT, NT, NO, BS +  EXT: PE ++    LEFT FOREARM W/ ? SMALL WOUND DISTAL TO ANTECUBITAL FOSSA [UNABLE TO EXPRESS DISCHARGE ]  SKIN: WARM,   SKELETAL:  ROM ACCEPTABLE  CNS:  AAO X 3    RADIOLOGY :    ACC: [id number] EXAM:  US DPLX LWR EXT VEINS COMPL BI                          PROCEDURE DATE:  2022          INTERPRETATION:  CLINICAL INFORMATION: Shortness of breath. Diabetes.    COMPARISON: None available.    TECHNIQUE: Duplex sonography of the BILATERAL LOWER extremity veins with   color and spectral Doppler, with and without compression. The examination   is technically limited secondary to subcutaneous edema in the calves and   body habitus.    FINDINGS:    RIGHT:  Normal compressibility of the RIGHT common femoral, femoral and popliteal   veins.  Doppler examination shows normal spontaneous and phasic flow.  Calf veins not seen.    Subcutaneous edema in the calf.    LEFT:  Normal compressibility of the LEFT common femoral, femoral and popliteal   veins.  Doppler examination shows normal spontaneous and phasic flow.  Calf veins not seen.    Subcutaneous edema in the calf.    IMPRESSION:    Technically limited study including nonvisualization of the calf veins   with no evidence of deep venous thrombosis in the visualized bilateral   lower extremity veins.    =========================    ACC: [id number] EXAM:  NM PULM VENTILATION PERFUS IMG                          PROCEDURE DATE:  2022          INTERPRETATION:  CLINICAL INFORMATION: 32 year old male with dyspnea,   elevated serum creatinine level; referred to evaluate for pulmonary   embolism.    RADIOPHARMACEUTICAL: 1.0 mCi Tc-99m-DTPA via inhalation; 6.2 mCi   Tc-99m-MAA, I.V.    TECHNIQUE:  Ventilation and perfusion images of the lungs were obtained   following administration of Tc-99m-DTPA and Tc-99m-MAA. Images were   obtained in the anterior, posterior, both lateral, and all 4 oblique   projections. The study was interpreted in conjunction with a chest   radiograph of 3/31/2022.    COMPARISON: No prior lung V/Q scan.    FINDINGS: There is heterogeneous distribution of radiopharmaceutical in   the lungs on the ventilation and perfusion images. There are no segmental   perfusion defects.    IMPRESSION: Very low probability of pulmonary embolus.      ASSESSMENT :       PLAN:  HPI:  32 year old male with Autism (without limitations of functionality and intellect; able to work and sign his own consent per mother who is at bedside)HTN, diabetes mellitus type 2, lymphedema since age 12 that was recently diagnosed who presented to [facility] ED 3/31/22 complaining of SOB and cough x1week. Patient states that he has not taken his Lantus 35units suc bedtime/Lispro 15unites with meals or Lisinopril 40mg po daily as he ran out of refills and has been waiting for an appointment.  He has noted increased abdominal weight with tighter pants along with SOB walking (can not quantify).  Patient states he was about to take off on a flight to [place] when he started having SOB and a cough with productive of clear sputum while on the airplane [place] the [employer].  Flight crew activated EMS and patient was BIBA to [facility]. Patient R/O MI with negative troponins. Labs were remarkable for Cr 1.83 and BNP was 2900 for which he was started on IV Lasix diuresis for acute systolic and diastolic CHF and Ddimer 335 with unremarkable LE dopplers and V/Q scan with very low probability of PE. Denies chest pain, fever, chills, abd pain, N/V/D.   Renal consulted for elevated Cr, likely due to DM2. Started on Mucomyst 1200mg po bid x4 doses in anticipation for cath.  Underwent an transthoracic Echo revealing EF 40-45%, mod global LV systolic dysfunction, mod concentric LV hypertrophy, sev dil LA, no sig valvular disease.   In light of patients cardiac risk factors, symptoms and abnormal noninvasive test findings the patient is now transferred to [facility] 22 for a cardiac catheterization to evaluate for ischemic etiology of CHF.   MEDS at [facility]: Coreg 3.125mg po BID, ASA 81mg po daily, Lipitor 40mg po daily, lisinopril 40mg po daily, Lantus 40units subcutaneous bedtime, lasix 40mg IV BID, Lispro 15units subc TID meals    Denies fever, cough, chills, headache, flu like symptoms, sick contact or recent travel  COVID PCR not detected on 4/3/22    UA with protein and small blood. Check spot Upr/Cr3  Pt will eventually benefit from ACEi/ARB due to proteinuria and HF, once renal function is stable (post cath).    (2022 14:13)    # CASE D/W MOTHER, ALBERTO SIMMONS @ BEDSIDE [] - ALL QUESTIONS ANSWERED    # ? LEFT FOREARM PHLEBITIS   - BCX [NGTD], MONITOR SITE  - WARM COMPRESSES, ARM ELEVATION  - ID CONSULT IN PROGRESS    # SUSPECT NEW ONSET CHF - SYSTOLIC AND DIASTOLIC DYSFUNCTION  # HYPERTENSIVE URGENCY, HTN  # B/L LE EDEMA - SUSPECT S/T CHF AND ?VARICOSE VEINS  - MONITOR ON TELEMETRY, TRENDED TROPONINS  - LASIX, STATIN, COREG  - REVIEWED TSH/LIPID PANEL/HBA1C  - CARDIOLOGY CONSULT    - ECHO - MILD MR, DILATED LA, CONCENTRIC LVH, GLOBAL LV SYSTOLIC DYSFUNCTION, G1DD  - TRANSFERRED TO [facility] FOR CARDIAC CATH    # ELEVATED D-DIMER  - V/Q SCAN - LOW PROBABILITY PE  - DOPPLER LE - NEGATIVE FOR DVT    # SUSANA VS. CKD - REVIEWED UA, MONITOR CR, AVOID NEPHROTOXIC AGENTS  - REVIEWED RENAL U/S  - NEPHROLOGY CONSULT IN PROGRESS    - F/U RENAL BX    # SUSPECT CAMERON  - OUTPATIENT SLEEP STUDY    # MORBID OBESITY   - NUTRITION CONSULT    # DM2, NONCOMPLIANT W/ INSULIN - HBA1C 9.4 - LANTUS, SSI + FS    # HLD - STARTED STATIN, REVIEWED LIPID PANEL    # GI AND DVT PPX      Patient is a 32y old  Male who presents with a chief complaint of SOB (2022 12:57)    PATIENT IS SEEN AND EXAMINED IN MEDICAL FLOOR.    ALLERGIES:  Keflex (Hives)    Daily     Daily Weight in k (2022 05:30)    VITALS:    Vital Signs Last 24 Hrs  T(C): 37 (2022 05:30), Max: 37 (2022 05:30)  T(F): 98.6 (2022 05:30), Max: 98.6 (2022 05:30)  HR: 93 (2022 05:30) (90 - 96)  BP: 122/67 (2022 05:30) (120/62 - 151/83)  BP(mean): --  RR: 18 (2022 05:30) (18 - 18)  SpO2: 99% (2022 05:30) (97% - 99%)    LABS:    CBC Full  -  ( 2022 07:41 )  WBC Count : 6.84 K/uL  RBC Count : 2.81 M/uL  Hemoglobin : 8.8 g/dL  Hematocrit : 26.6 %  Platelet Count - Automated : 271 K/uL  Mean Cell Volume : 94.7 fL  Mean Cell Hemoglobin : 31.3 pg  Mean Cell Hemoglobin Concentration : 33.1 gm/dL  Auto Neutrophil # : x  Auto Lymphocyte # : x  Auto Monocyte # : x  Auto Eosinophil # : x  Auto Basophil # : x  Auto Neutrophil % : x  Auto Lymphocyte % : x  Auto Monocyte % : x  Auto Eosinophil % : x  Auto Basophil % : x          136  |  103  |  44<H>  ----------------------------<  156<H>  4.8   |  22  |  2.37<H>    Ca    8.3<L>      2022 07:41  Phos  4.2     -  Mg     2.20     -07    TPro  5.8<L>  /  Alb  x   /  TBili  x   /  DBili  x   /  AST  x   /  ALT  x   /  AlkPhos  x   04-06    CAPILLARY BLOOD GLUCOSE  148 (2022 08:34)  169 (2022 06:00)      POCT Blood Glucose.: 148 mg/dL (2022 08:34)  POCT Blood Glucose.: 169 mg/dL (2022 05:51)  POCT Blood Glucose.: 271 mg/dL (2022 01:00)  POCT Blood Glucose.: 178 mg/dL (2022 21:52)  POCT Blood Glucose.: 117 mg/dL (2022 17:09)  POCT Blood Glucose.: 135 mg/dL (2022 12:34)        LIVER FUNCTIONS - ( 2022 21:28 )  Alb: x     / Pro: 5.8 g/dL / ALK PHOS: x     / ALT: x     / AST: x     / GGT: x           Creatinine Trend: 2.37<--, 1.91<--, 1.84<--, 1.64<--, 1.64<--, 1.63<--  I&O's Summary    2022 07:01  -  2022 07:00  --------------------------------------------------------  IN: 600 mL / OUT: 1200 mL / NET: -600 mL      .Blood Blood-Peripheral  -04 @ 22:49   No growth to date.  --  --    MEDICATIONS:    MEDICATIONS  (STANDING):  acetylcysteine  Oral Solution 1200 milliGRAM(s) Oral every 12 hours  atorvastatin 40 milliGRAM(s) Oral at bedtime  carvedilol 6.25 milliGRAM(s) Oral every 12 hours  chlorhexidine 2% Cloths 1 Application(s) Topical daily  dextrose 5%. 1000 milliLiter(s) (100 mL/Hr) IV Continuous <Continuous>  dextrose 5%. 1000 milliLiter(s) (50 mL/Hr) IV Continuous <Continuous>  dextrose 50% Injectable 25 Gram(s) IV Push once  dextrose 50% Injectable 12.5 Gram(s) IV Push once  dextrose 50% Injectable 25 Gram(s) IV Push once  glucagon  Injectable 1 milliGRAM(s) IntraMuscular once  heparin   Injectable 5000 Unit(s) SubCutaneous every 12 hours  hydrALAZINE 25 milliGRAM(s) Oral three times a day  insulin glargine Injectable (LANTUS) 40 Unit(s) SubCutaneous at bedtime  insulin lispro (ADMELOG) corrective regimen sliding scale   SubCutaneous three times a day before meals  insulin lispro (ADMELOG) corrective regimen sliding scale   SubCutaneous at bedtime  insulin lispro Injectable (ADMELOG) 15 Unit(s) SubCutaneous three times a day before meals  sodium chloride 0.9% lock flush 3 milliLiter(s) IV Push every 8 hours      MEDICATIONS  (PRN):  dextrose Oral Gel 15 Gram(s) Oral once PRN Blood Glucose LESS THAN 70 milliGRAM(s)/deciliter      REVIEW OF SYSTEMS:                           ALL ROS DONE [ X   ]    CONSTITUTIONAL:  LETHARGIC [   ], FEVER [   ], UNRESPONSIVE [   ]  CVS:  CP  [   ], SOB, [   ], PALPITATIONS [   ], DIZZYNESS [   ]  RS: COUGH [   ], SPUTUM [   ]  GI: ABDOMINAL PAIN [   ], NAUSEA [   ], VOMITINGS [   ], DIARRHEA [   ], CONSTIPATION [   ]  :  DYSURIA [   ], NOCTURIA [   ], INCREASED FREQUENCY [   ], DRIBLING [   ],  SKELETAL: PAINFUL JOINTS [   ], SWOLLEN JOINTS [   ], NECK ACHE [   ], LOW BACK ACHE [   ],  SKIN : ULCERS [   ], RASH [   ], ITCHING [   ]  CNS: HEAD ACHE [   ], DOUBLE VISION [   ], BLURRED VISION [   ], AMS / CONFUSION [   ], SEIZURES [   ], WEAKNESS [   ],TINGLING / NUMBNESS [   ]    PHYSICAL EXAMINATION:  GENERAL APPEARANCE: NO DISTRESS  HEENT:  NO PALLOR, NO  JVD,  NO   NODES, NECK SUPPLE  CVS: S1 +, S2 +,   RS: AEEB,  OCCASIONAL  RALES +,   NO RONCHI  ABD: SOFT, NT, NO, BS +  EXT: PE ++    LEFT FOREARM W/ ? SMALL WOUND DISTAL TO ANTECUBITAL FOSSA [UNABLE TO EXPRESS DISCHARGE ]  SKIN: WARM,   SKELETAL:  ROM ACCEPTABLE  CNS:  AAO X 3    RADIOLOGY :    ACC: [id number] EXAM:  US DPLX LWR EXT VEINS COMPL BI                          PROCEDURE DATE:  2022          INTERPRETATION:  CLINICAL INFORMATION: Shortness of breath. Diabetes.    COMPARISON: None available.    TECHNIQUE: Duplex sonography of the BILATERAL LOWER extremity veins with   color and spectral Doppler, with and without compression. The examination   is technically limited secondary to subcutaneous edema in the calves and   body habitus.    FINDINGS:    RIGHT:  Normal compressibility of the RIGHT common femoral, femoral and popliteal   veins.  Doppler examination shows normal spontaneous and phasic flow.  Calf veins not seen.    Subcutaneous edema in the calf.    LEFT:  Normal compressibility of the LEFT common femoral, femoral and popliteal   veins.  Doppler examination shows normal spontaneous and phasic flow.  Calf veins not seen.    Subcutaneous edema in the calf.    IMPRESSION:    Technically limited study including nonvisualization of the calf veins   with no evidence of deep venous thrombosis in the visualized bilateral   lower extremity veins.    =========================    ACC: [id number] EXAM:  NM PULM VENTILATION PERFUS IMG                          PROCEDURE DATE:  2022          INTERPRETATION:  CLINICAL INFORMATION: 32 year old male with dyspnea,   elevated serum creatinine level; referred to evaluate for pulmonary   embolism.    RADIOPHARMACEUTICAL: 1.0 mCi Tc-99m-DTPA via inhalation; 6.2 mCi   Tc-99m-MAA, I.V.    TECHNIQUE:  Ventilation and perfusion images of the lungs were obtained   following administration of Tc-99m-DTPA and Tc-99m-MAA. Images were   obtained in the anterior, posterior, both lateral, and all 4 oblique   projections. The study was interpreted in conjunction with a chest   radiograph of 3/31/2022.    COMPARISON: No prior lung V/Q scan.    FINDINGS: There is heterogeneous distribution of radiopharmaceutical in   the lungs on the ventilation and perfusion images. There are no segmental   perfusion defects.    IMPRESSION: Very low probability of pulmonary embolus.      ASSESSMENT :       PLAN:  HPI:  32 year old male with Autism (without limitations of functionality and intellect; able to work and sign his own consent per mother who is at bedside)HTN, diabetes mellitus type 2, lymphedema since age 12 that was recently diagnosed who presented to [facility] ED 3/31/22 complaining of SOB and cough x1week. Patient states that he has not taken his Lantus 35units suc bedtime/Lispro 15unites with meals or Lisinopril 40mg po daily as he ran out of refills and has been waiting for an appointment.  He has noted increased abdominal weight with tighter pants along with SOB walking (can not quantify).  Patient states he was about to take off on a flight to [place] when he started having SOB and a cough with productive of clear sputum while on the airplane on the [employer].  Flight crew activated EMS and patient was BIBA to [facility]. Patient R/O MI with negative troponins. Labs were remarkable for Cr 1.83 and BNP was 2900 for which he was started on IV Lasix diuresis for acute systolic and diastolic CHF and Ddimer 335 with unremarkable LE dopplers and V/Q scan with very low probability of PE. Denies chest pain, fever, chills, abd pain, N/V/D.   Renal consulted for elevated Cr, likely due to DM2. Started on Mucomyst 1200mg po bid x4 doses in anticipation for cath.  Underwent an transthoracic Echo revealing EF 40-45%, mod global LV systolic dysfunction, mod concentric LV hypertrophy, sev dil LA, no sig valvular disease.   In light of patients cardiac risk factors, symptoms and abnormal noninvasive test findings the patient is now transferred to [facility] 22 for a cardiac catheterization to evaluate for ischemic etiology of CHF.   MEDS at [facility]: Coreg 3.125mg po BID, ASA 81mg po daily, Lipitor 40mg po daily, lisinopril 40mg po daily, Lantus 40units subcutaneous bedtime, lasix 40mg IV BID, Lispro 15units subc TID meals    Denies fever, cough, chills, headache, flu like symptoms, sick contact or recent travel  COVID PCR not detected on 4/3/22    UA with protein and small blood. Check spot Upr/Cr3  Pt will eventually benefit from ACEi/ARB due to proteinuria and HF, once renal function is stable (post cath).    (2022 14:13)    # CASE D/W MOTHER, ALBERTO SIMMONS @ BEDSIDE [] - ALL QUESTIONS ANSWERED    # ? LEFT FOREARM PHLEBITIS   - BCX [NGTD], MONITOR SITE  - WARM COMPRESSES, ARM ELEVATION  - ID CONSULT IN PROGRESS    # SUSPECT NEW ONSET CHF - SYSTOLIC AND DIASTOLIC DYSFUNCTION  # HYPERTENSIVE URGENCY, HTN  # B/L LE EDEMA - SUSPECT S/T CHF AND ?VARICOSE VEINS  - MONITOR ON TELEMETRY, TRENDED TROPONINS  - LASIX, STATIN, COREG  - HOLD ASA  - REVIEWED TSH/LIPID PANEL/HBA1C  - CARDIOLOGY CONSULT    - ECHO - MILD MR, DILATED LA, CONCENTRIC LVH, GLOBAL LV SYSTOLIC DYSFUNCTION, G1DD  - TRANSFERRED TO [facility] FOR CARDIAC CATH    # ELEVATED D-DIMER  - V/Q SCAN - LOW PROBABILITY PE  - DOPPLER LE - NEGATIVE FOR DVT    # SUSANA VS. CKD - REVIEWED UA, MONITOR CR, AVOID NEPHROTOXIC AGENTS  - REVIEWED RENAL U/S  - NEPHROLOGY CONSULT IN PROGRESS    - F/U RENAL BX    - CR WORSENING, DIURETICS HELD    # SUSPECT CAMERON  - OUTPATIENT SLEEP STUDY    # MORBID OBESITY   - NUTRITION CONSULT    # DM2, NONCOMPLIANT W/ INSULIN - HBA1C 9.4 - LANTUS, SSI + FS    - HYPOGLYCEMIA, REDUCING DOSE OF INSULIN, MONITORING FINGERSTICKS    # HLD - STARTED STATIN, REVIEWED LIPID PANEL    # GI AND DVT PPX

## 2022-04-07 NOTE — PROGRESS NOTE ADULT - ASSESSMENT
32y Male with history of HTN presents with SOB transferred for cardiac cath. Nephrology consulted for elevated Scr.    1) SUSANA: likely hemodynamically mediated in setting of CRS? Scr increasing likely due to diuretics and ACE-I (now discontinued). UA with microscopic hematuria and subnephrotic range proteinuria. FeUrea low. Renal US negative for obstruction. Avoid nephrotoxins.    2) CKD Unspecified: Patient denies h/o CKD? but will need to confirm baseline with PMD (Dr. Luís Mast in [place]). Suspect patient with underlying diabetic nephropathy given microscopic hematuria and subnephrotic range proteinuria and large kidneys on renal US. Follow up proteinuria and GN serologies. Patient planned for IR guided kidney biopsy on Monday. Monitor electrolytes.    3) HTN with CKD: BP improving. Continue with current medications. Will discontinue lisinopril given SUSANA. Monitor BP.    4) LE edema: Improving. Do not suspect lymphedema will improve significantly with diuresis. Hold bumex 1 mg PO daily given SUSANA and plan for cardiac cath. TTE with moderate global LVSD and diastolic dysfunction. Monitor UO.    If patient planned for cardiac cath, patient educated that he has a 26% risk of developing WANDA and 1% risk of requiring RRT given risk factors of CHF, anemia, DM and CKD. Continue with mucomyst as ordered and holding diuretics. Defer IVF given moderate global LVSD on TTE. Would ideally wait until Scr stabilizes prior to proceeding with cardiac cath.     [facility] NEPHROLOGY  Han Choudhury M.D.  Mekhi Verdin D.O.  Camille Brown M.D.  Lorena Wesley, MSN, ANP-C    Telephone: [phone]  Facsimile: [phone]    [id number] [place] [id number]

## 2022-04-07 NOTE — PROGRESS NOTE ADULT - SUBJECTIVE AND OBJECTIVE BOX
C A R D I O L O G Y  **********************************     DATE OF SERVICE: 04-07-22    Patient denies chest pain or shortness of breath.   Review of systems otherwise (-)    Review of Systems:   Constitutional: [ ] fevers, [ ] chills.   Skin: [ ] dry skin. [ ] rashes.  Psychiatric: [ ] depression, [ ] anxiety.   Gastrointestinal: [ ] BRBPR, [ ] melena.   Neurological: [ ] confusion. [ ] seizures. [ ] shuffling gait.   Ears,Nose,Mouth and Throat: [ ] ear pain [ ] sore throat.   Eyes: [ ] diplopia.   Respiratory: [ ] hemoptysis. [ ] shortness of breath  Cardiovascular: See HPI above  Hematologic/Lymphatic: [ ] anemia. [ ] painful nodes. [ ] prolonged bleeding.   Genitourinary: [ ] hematuria. [ ] flank pain.   Endocrine: [ ] significant change in weight. [ ] intolerance to heat and cold.     Review of systems [ x] otherwise negative, [ ] otherwise unable to obtain    FH: no family history of sudden cardiac death in first degree relatives    SH: [ ] tobacco, [ ] alcohol, [ ] drugs    atorvastatin 40 milliGRAM(s) Oral at bedtime  carvedilol 6.25 milliGRAM(s) Oral every 12 hours  chlorhexidine 2% Cloths 1 Application(s) Topical daily  dextrose 5%. 1000 milliLiter(s) IV Continuous <Continuous>  dextrose 5%. 1000 milliLiter(s) IV Continuous <Continuous>  dextrose 50% Injectable 25 Gram(s) IV Push once  dextrose 50% Injectable 12.5 Gram(s) IV Push once  dextrose 50% Injectable 25 Gram(s) IV Push once  dextrose Oral Gel 15 Gram(s) Oral once PRN  glucagon  Injectable 1 milliGRAM(s) IntraMuscular once  heparin   Injectable 5000 Unit(s) SubCutaneous every 12 hours  hydrALAZINE 25 milliGRAM(s) Oral three times a day  insulin glargine Injectable (LANTUS) 40 Unit(s) SubCutaneous at bedtime  insulin lispro (ADMELOG) corrective regimen sliding scale   SubCutaneous three times a day before meals  insulin lispro (ADMELOG) corrective regimen sliding scale   SubCutaneous at bedtime  insulin lispro Injectable (ADMELOG) 15 Unit(s) SubCutaneous three times a day before meals  sodium chloride 0.9% lock flush 3 milliLiter(s) IV Push every 8 hours                            8.8    6.84  )-----------( 271      ( 07 Apr 2022 07:41 )             26.6       04-07    136  |  103  |  44<H>  ----------------------------<  156<H>  4.8   |  22  |  2.37<H>    Ca    8.3<L>      07 Apr 2022 07:41  Phos  4.2     04-07  Mg     2.20     04-07    TPro  5.8<L>  /  Alb  x   /  TBili  x   /  DBili  x   /  AST  x   /  ALT  x   /  AlkPhos  x   04-06        T(C): 36.9 (04-07-22 @ 12:15), Max: 37 (04-07-22 @ 05:30)  HR: 88 (04-07-22 @ 12:15) (88 - 96)  BP: 149/87 (04-07-22 @ 12:15) (122/67 - 151/83)  RR: 19 (04-07-22 @ 12:15) (18 - 19)  SpO2: 100% (04-07-22 @ 12:15) (97% - 100%)  Wt(kg): --    I&O's Summary    06 Apr 2022 07:01  -  07 Apr 2022 07:00  --------------------------------------------------------  IN: 600 mL / OUT: 1200 mL / NET: -600 mL      General: Well nourished in no acute distress. Alert and Oriented * 3.   Head: Normocephalic and atraumatic.   Neck: No JVD. No bruits. Supple. Does not appear to be enlarged.   Cardiovascular: + S1,S2 ; RRR Soft systolic murmur at the left lower sternal border. No rubs noted.    Lungs: CTA b/l. No rhonchi, rales or wheezes.   Abdomen: + BS, soft. Non tender. Non distended. No rebound. No guarding.   Extremities: No clubbing/cyanosis/edema.   Neurologic: Moves all four extremities. Full range of motion.   Skin: Warm and moist. The patient's skin has normal elasticity and good skin turgor.   Psychiatric: Appropriate mood and affect.  Musculoskeletal: Normal range of motion, normal strength    TELEMETRY: 	  Sinus     < from: Transthoracic Echocardiogram (04.01.22 @ 07:17) >  CONCLUSIONS:  1. Normal mitral valve. Mild mitral regurgitation.  2. Normal trileaflet aortic valve.  3. Aortic Root: 3.7 cm.  4. Severely dilated left atrium.  LA volume index = 66  cc/m2.  5. Moderate concentric left ventricular hypertrophy.  6. Moderate global left ventricular systolic dysfunction.  7. Grade I diastolic dysfunction (Impaired relaxation,  mild).  8. Normal right atrium.  9. Normal right ventricular size and systolic function  (TAPSE  1.9cm).  10. Unable to estimate RVSP.  11. There is trace tricuspid regurgitation.  12. There is trace pulmonic regurgitation.  13. Small pericardial effusion.    < end of copied text >        ASSESSMENT/PLAN: 	32y  Male  PMH of HTN, HLD, Varicose veins presents to the ED for SOB and cough positive trop elevated BNP found with acute systolic heart failure.    - PT with Elevated Ddimer on admit, VQ scan (-) and lower extremity dopplers (-)   - Echo with moderate LV systolic dysfunction   - s/p IV diuresis, oral diuretics on hold   - cont Coreg  - cath cancelled due to elevated BP and possible IV site infection   - BP has improved after hydralazine  -ID eval appreciated, Blood cx negative  -Renal eval noted, plan for possible renal bx Monday (holding ASA for 5 days)  -Renal bx cannot be done on APT, so therefore will hold off on cardiac cath until after procedure  - cath when medically optimized   -montior H/H, repeat labs today

## 2022-04-07 NOTE — CHART NOTE - NSCHARTNOTEFT_GEN_A_CORE
IR Pre-Procedure Note    Patient Age: 32y    Patient Gender: Male    Procedure (including site / side if known): Renal biopsy    Diagnosis / Indication: Patient is a 32y old with Autism (without limitations of functionality and intellect; able to work and sign his own consent per mother who is at bedside) and PMHx HTN presents with SOB transferred for cardiac cath also found to have SUSANA and UA with microscopic hematuria and subnephrotic range proteinuria.     Interventional Radiology Attending Physician: Dr. Erickson    Ordering Attending Physician: Dr. Tracy    PAST MEDICAL & SURGICAL HISTORY:  Diabetes mellitus  Hypertension  Acute combined systolic and diastolic congestive heart failure  Morbidly obese  Lymphedema  Autism - without limitations of functionality and intellect; able to work and sign his own consent per mother who is at bedside    Pertinent Labs:   CBC Full  -  ( 07 Apr 2022 07:41 )  WBC Count : 6.84 K/uL  RBC Count : 2.81 M/uL  Hemoglobin : 8.8 g/dL  Hematocrit : 26.6 %  Platelet Count - Automated : 271 K/uL  Mean Cell Volume : 94.7 fL  Mean Cell Hemoglobin : 31.3 pg  Mean Cell Hemoglobin Concentration : 33.1 gm/dL  Auto Neutrophil # : x  Auto Lymphocyte # : x  Auto Monocyte # : x  Auto Eosinophil # : x  Auto Basophil # : x  Auto Neutrophil % : x  Auto Lymphocyte % : x  Auto Monocyte % : x  Auto Eosinophil % : x  Auto Basophil % : x    04-07    136  |  103  |  44<H>  ----------------------------<  156<H>  4.8   |  22  |  2.37<H>    Ca    8.3<L>      07 Apr 2022 07:41  Phos  4.2     04-07  Mg     2.20     04-07    TPro  5.8<L>  /  Alb  x   /  TBili  x   /  DBili  x   /  AST  x   /  ALT  x   /  AlkPhos  x   04-06      Patient / Family aware of procedure:   [ x ] Y   [  ] N      Kenia Brian NP-BC  Department of Medicine  In House Pager #[phone]

## 2022-04-08 LAB
ANA PAT FLD IF-IMP: ABNORMAL
ANA TITR SER: ABNORMAL
ANION GAP SERPL CALC-SCNC: 11 MMOL/L — SIGNIFICANT CHANGE UP (ref 7–14)
BUN SERPL-MCNC: 37 MG/DL — HIGH (ref 7–23)
CALCIUM SERPL-MCNC: 8.6 MG/DL — SIGNIFICANT CHANGE UP (ref 8.4–10.5)
CHLORIDE SERPL-SCNC: 106 MMOL/L — SIGNIFICANT CHANGE UP (ref 98–107)
CO2 SERPL-SCNC: 21 MMOL/L — LOW (ref 22–31)
CREAT SERPL-MCNC: 1.85 MG/DL — HIGH (ref 0.5–1.3)
EGFR: 49 ML/MIN/1.73M2 — LOW
GLUCOSE BLDC GLUCOMTR-MCNC: 104 MG/DL — HIGH (ref 70–99)
GLUCOSE BLDC GLUCOMTR-MCNC: 104 MG/DL — HIGH (ref 70–99)
GLUCOSE BLDC GLUCOMTR-MCNC: 106 MG/DL — HIGH (ref 70–99)
GLUCOSE BLDC GLUCOMTR-MCNC: 117 MG/DL — HIGH (ref 70–99)
GLUCOSE BLDC GLUCOMTR-MCNC: 129 MG/DL — HIGH (ref 70–99)
GLUCOSE BLDC GLUCOMTR-MCNC: 172 MG/DL — HIGH (ref 70–99)
GLUCOSE BLDC GLUCOMTR-MCNC: 196 MG/DL — HIGH (ref 70–99)
GLUCOSE BLDC GLUCOMTR-MCNC: 84 MG/DL — SIGNIFICANT CHANGE UP (ref 70–99)
GLUCOSE BLDC GLUCOMTR-MCNC: 89 MG/DL — SIGNIFICANT CHANGE UP (ref 70–99)
GLUCOSE SERPL-MCNC: 81 MG/DL — SIGNIFICANT CHANGE UP (ref 70–99)
HBV SURFACE AG SER-ACNC: SIGNIFICANT CHANGE UP
HCT VFR BLD CALC: 27.2 % — LOW (ref 39–50)
HCV AB S/CO SERPL IA: 0.18 S/CO — SIGNIFICANT CHANGE UP (ref 0–0.99)
HCV AB SERPL-IMP: SIGNIFICANT CHANGE UP
HGB BLD-MCNC: 9 G/DL — LOW (ref 13–17)
MAGNESIUM SERPL-MCNC: 2.2 MG/DL — SIGNIFICANT CHANGE UP (ref 1.6–2.6)
MCHC RBC-ENTMCNC: 31.6 PG — SIGNIFICANT CHANGE UP (ref 27–34)
MCHC RBC-ENTMCNC: 33.1 GM/DL — SIGNIFICANT CHANGE UP (ref 32–36)
MCV RBC AUTO: 95.4 FL — SIGNIFICANT CHANGE UP (ref 80–100)
NRBC # BLD: 0 /100 WBCS — SIGNIFICANT CHANGE UP
NRBC # FLD: 0 K/UL — SIGNIFICANT CHANGE UP
PHOSPHATE SERPL-MCNC: 4.2 MG/DL — SIGNIFICANT CHANGE UP (ref 2.5–4.5)
PLATELET # BLD AUTO: 295 K/UL — SIGNIFICANT CHANGE UP (ref 150–400)
POTASSIUM SERPL-MCNC: 4.7 MMOL/L — SIGNIFICANT CHANGE UP (ref 3.5–5.3)
POTASSIUM SERPL-SCNC: 4.7 MMOL/L — SIGNIFICANT CHANGE UP (ref 3.5–5.3)
RBC # BLD: 2.85 M/UL — LOW (ref 4.2–5.8)
RBC # FLD: 11.9 % — SIGNIFICANT CHANGE UP (ref 10.3–14.5)
SODIUM SERPL-SCNC: 138 MMOL/L — SIGNIFICANT CHANGE UP (ref 135–145)
WBC # BLD: 6.73 K/UL — SIGNIFICANT CHANGE UP (ref 3.8–10.5)
WBC # FLD AUTO: 6.73 K/UL — SIGNIFICANT CHANGE UP (ref 3.8–10.5)

## 2022-04-08 RX ORDER — INSULIN LISPRO 100/ML
8 VIAL (ML) SUBCUTANEOUS ONCE
Refills: 0 | Status: COMPLETED | OUTPATIENT
Start: 2022-04-08 | End: 2022-04-08

## 2022-04-08 RX ADMIN — Medication 25 MILLIGRAM(S): at 05:09

## 2022-04-08 RX ADMIN — HEPARIN SODIUM 5000 UNIT(S): 5000 INJECTION INTRAVENOUS; SUBCUTANEOUS at 18:13

## 2022-04-08 RX ADMIN — Medication 8 UNIT(S): at 18:08

## 2022-04-08 RX ADMIN — SODIUM CHLORIDE 3 MILLILITER(S): 9 INJECTION INTRAMUSCULAR; INTRAVENOUS; SUBCUTANEOUS at 13:01

## 2022-04-08 RX ADMIN — CARVEDILOL PHOSPHATE 6.25 MILLIGRAM(S): 80 CAPSULE, EXTENDED RELEASE ORAL at 17:01

## 2022-04-08 RX ADMIN — ATORVASTATIN CALCIUM 40 MILLIGRAM(S): 80 TABLET, FILM COATED ORAL at 21:13

## 2022-04-08 RX ADMIN — HEPARIN SODIUM 5000 UNIT(S): 5000 INJECTION INTRAVENOUS; SUBCUTANEOUS at 05:10

## 2022-04-08 RX ADMIN — Medication 1: at 12:59

## 2022-04-08 RX ADMIN — INSULIN GLARGINE 40 UNIT(S): 100 INJECTION, SOLUTION SUBCUTANEOUS at 22:22

## 2022-04-08 RX ADMIN — SODIUM CHLORIDE 3 MILLILITER(S): 9 INJECTION INTRAMUSCULAR; INTRAVENOUS; SUBCUTANEOUS at 21:04

## 2022-04-08 RX ADMIN — Medication 15 UNIT(S): at 12:59

## 2022-04-08 RX ADMIN — SODIUM CHLORIDE 3 MILLILITER(S): 9 INJECTION INTRAMUSCULAR; INTRAVENOUS; SUBCUTANEOUS at 05:19

## 2022-04-08 RX ADMIN — Medication 25 MILLIGRAM(S): at 13:00

## 2022-04-08 RX ADMIN — Medication 25 MILLIGRAM(S): at 21:13

## 2022-04-08 RX ADMIN — Medication 15 UNIT(S): at 08:49

## 2022-04-08 RX ADMIN — CARVEDILOL PHOSPHATE 6.25 MILLIGRAM(S): 80 CAPSULE, EXTENDED RELEASE ORAL at 05:09

## 2022-04-08 RX ADMIN — CHLORHEXIDINE GLUCONATE 1 APPLICATION(S): 213 SOLUTION TOPICAL at 08:51

## 2022-04-08 NOTE — CONSULT NOTE ADULT - ASSESSMENT
Hematology/Oncology consulted on this 32 year old male with hx of Autism, HTN, DM and Lymphedema who presented to [facility] with complaints of SOB and cough who underwent a TTE revealing CHF and transferred to [facility] 4/4 for a cardiac cath and found to have an elevated SPEP.    Elevated SPEP  --ZARA Kappa 9.92  --ZARA Jocelyn 5.43  --Immunofixation with a weak IgA band is present. Polyclonal kappa, Lambda, IgG and IgM seen, this is likely inflammatory    Anemia  --Hgb 9.0 though has been dropping  --may be due to renal disease  --If acute drop, please transfuse for a Hgb < 7.0  --Ferritin 585, Iron sat 48%  --please check for occult blood  --may consider GI consult if Hgb continues to downtrend      SUSANA  --Creatinine 1.85 which is improving  --renal following    CHF  --diuretics  --Management per cardiology    Patient may follow with Dr Joseph of [facility] after discharge  Thank you for allowing us to participate in Mr Garcia's care    Lela Berkowitz NP  Hematology/Oncology  [facility]  [phone] (Office)  [phone] (Alt office)  Evenings and weekends please call MD on call or office     Hematology/Oncology consulted on this 32 year old male with hx of Autism, HTN, DM and Lymphedema who presented to [facility] with complaints of SOB and cough who underwent a TTE revealing CHF and transferred to [facility] 4/4 for a cardiac cath and found to have weak iga band..    Elevated SPEP  --ZARA Kappa 9.92  --ZARA Jocelyn 5.43  --Immunofixation with a weak IgA band is present. Polyclonal kappa, Lambda, IgG and IgM seen, this is likely inflammatory  recommend repeating SPEP/ZARA.    Anemia  --Hgb 9.0 though has been dropping  --may be due to renal disease  --If acute drop, please transfuse for a Hgb < 7.0  --Ferritin 585, Iron sat 48%  --repeat IRON STUDIES;      SUSANA  --Creatinine 1.85 which is improving  --renal following    CHF  --diuretics  --Management per cardiology    Patient may follow with Dr Joseph of [facility] after discharge  Thank you for allowing us to participate in Mr Garcia's care    Lela Berkowitz NP  Hematology/Oncology  [facility]  [phone] (Office)  [phone] (Alt office)  Evenings and weekends please call MD on call or office

## 2022-04-08 NOTE — CONSULT NOTE ADULT - PROVIDER SPECIALTY LIST ADULT
Infectious Disease
Heme/Onc
Internal Medicine
Infectious Disease
Nephrology
Infectious Disease
Intervent Radiology

## 2022-04-08 NOTE — CONSULT NOTE ADULT - NS ATTEND AMEND GEN_ALL_CORE FT
I have fully participated in the care of this patient. I have made amendments to the documentation where necessary, and agree with the history, physical exam, and plan as documented by the ACP.     1. unclear cause of cardiac/renal findings  spep findings unclear;   AL considered but less likely w/ this pattern  please repeat SPEP/ZARA.   SEND QIGGS, send UPEP/ZARA.  repeat iron studies; unclear etiology of iron saturation increase;  unlikely iron overload given ferritin level.  f/u nephrology and cardiology recommendations    Thank you for the consultation    Tasha Joseph MD  Hematology/Oncology  [phone]

## 2022-04-08 NOTE — CONSULT NOTE ADULT - REASON FOR ADMISSION
SOB/Cough
angiogram
coronary angiogram, transfer from [facility]

## 2022-04-08 NOTE — PROGRESS NOTE ADULT - SUBJECTIVE AND OBJECTIVE BOX
C A R D I O L O G Y  **********************************     DATE OF SERVICE: 04-08-22    S: no chest pain or sob;   Review of systems otherwise (-)    Review of Systems:   Constitutional: [ ] fevers, [ ] chills.   Skin: [ ] dry skin. [ ] rashes.  Psychiatric: [ ] depression, [ ] anxiety.   Gastrointestinal: [ ] BRBPR, [ ] melena.   Neurological: [ ] confusion. [ ] seizures. [ ] shuffling gait.   Ears,Nose,Mouth and Throat: [ ] ear pain [ ] sore throat.   Eyes: [ ] diplopia.   Respiratory: [ ] hemoptysis. [ ] shortness of breath  Cardiovascular: See HPI above  Hematologic/Lymphatic: [ ] anemia. [ ] painful nodes. [ ] prolonged bleeding.   Genitourinary: [ ] hematuria. [ ] flank pain.   Endocrine: [ ] significant change in weight. [ ] intolerance to heat and cold.     Review of systems [ x] otherwise negative, [ ] otherwise unable to obtain    FH: no family history of sudden cardiac death in first degree relatives    SH: [ ] tobacco, [ ] alcohol, [ ] drugs    atorvastatin 40 milliGRAM(s) Oral at bedtime  carvedilol 6.25 milliGRAM(s) Oral every 12 hours  chlorhexidine 2% Cloths 1 Application(s) Topical daily  dextrose 5%. 1000 milliLiter(s) IV Continuous <Continuous>  dextrose 5%. 1000 milliLiter(s) IV Continuous <Continuous>  dextrose 50% Injectable 25 Gram(s) IV Push once  dextrose 50% Injectable 12.5 Gram(s) IV Push once  dextrose 50% Injectable 25 Gram(s) IV Push once  dextrose Oral Gel 15 Gram(s) Oral once PRN  glucagon  Injectable 1 milliGRAM(s) IntraMuscular once  heparin   Injectable 5000 Unit(s) SubCutaneous every 12 hours  hydrALAZINE 25 milliGRAM(s) Oral three times a day  insulin glargine Injectable (LANTUS) 40 Unit(s) SubCutaneous at bedtime  insulin lispro (ADMELOG) corrective regimen sliding scale   SubCutaneous at bedtime  insulin lispro (ADMELOG) corrective regimen sliding scale   SubCutaneous three times a day before meals  insulin lispro Injectable (ADMELOG) 15 Unit(s) SubCutaneous three times a day before meals  sodium chloride 0.9% lock flush 3 milliLiter(s) IV Push every 8 hours                            9.0    6.73  )-----------( 295      ( 08 Apr 2022 08:42 )             27.2       04-08    138  |  106  |  37<H>  ----------------------------<  81  4.7   |  21<L>  |  1.85<H>    Ca    8.6      08 Apr 2022 08:42  Phos  4.2     04-08  Mg     2.20     04-08    TPro  5.8<L>  /  Alb  x   /  TBili  x   /  DBili  x   /  AST  x   /  ALT  x   /  AlkPhos  x   04-06            T(C): 36.8 (04-08-22 @ 05:09), Max: 36.9 (04-07-22 @ 22:15)  HR: 90 (04-08-22 @ 05:09) (90 - 98)  BP: 137/86 (04-08-22 @ 05:09) (110/76 - 137/86)  RR: 18 (04-08-22 @ 05:09) (18 - 18)  SpO2: 99% (04-08-22 @ 05:09) (99% - 99%)  Wt(kg): --    I&O's Summary    07 Apr 2022 07:01  -  08 Apr 2022 07:00  --------------------------------------------------------  IN: 900 mL / OUT: 550 mL / NET: 350 mL        General: Well nourished in no acute distress. Alert and Oriented * 3.   Head: Normocephalic and atraumatic.   Neck: No JVD. No bruits. Supple. Does not appear to be enlarged.   Cardiovascular: + S1,S2 ; RRR Soft systolic murmur at the left lower sternal border. No rubs noted.    Lungs: CTA b/l. No rhonchi, rales or wheezes.   Abdomen: + BS, soft. Non tender. Non distended. No rebound. No guarding.   Extremities: No clubbing/cyanosis/edema.   Neurologic: Moves all four extremities. Full range of motion.   Skin: Warm and moist. The patient's skin has normal elasticity and good skin turgor.   Psychiatric: Appropriate mood and affect.  Musculoskeletal: Normal range of motion, normal strength    TELEMETRY: 	  Sinus     < from: Transthoracic Echocardiogram (04.01.22 @ 07:17) >  CONCLUSIONS:  1. Normal mitral valve. Mild mitral regurgitation.  2. Normal trileaflet aortic valve.  3. Aortic Root: 3.7 cm.  4. Severely dilated left atrium.  LA volume index = 66  cc/m2.  5. Moderate concentric left ventricular hypertrophy.  6. Moderate global left ventricular systolic dysfunction.  7. Grade I diastolic dysfunction (Impaired relaxation,  mild).  8. Normal right atrium.  9. Normal right ventricular size and systolic function  (TAPSE  1.9cm).  10. Unable to estimate RVSP.  11. There is trace tricuspid regurgitation.  12. There is trace pulmonic regurgitation.  13. Small pericardial effusion.    < end of copied text >        ASSESSMENT/PLAN: 	32y  Male  PMH of HTN, HLD, Varicose veins presents to the ED for SOB and cough positive trop elevated BNP found with acute systolic heart failure.    - PT with Elevated Ddimer on admit, VQ scan (-) and lower extremity dopplers (-)   - Echo with moderate LV systolic dysfunction   - s/p IV diuresis, oral diuretics on hold per renal   - cont Coreg  - cath cancelled due to elevated BP and possible IV site infection   - BP has improved after hydralazine  -ID eval appreciated, Blood cx negative  -Renal eval noted, plan for possible renal bx Monday (holding ASA for 5 days)  -Renal bx cannot be done on APT, so therefore will hold off on cardiac cath until after procedure  -cath when medically optimized   -Heme eval called for positive serum immunofixation     Michelle Tracy MD

## 2022-04-08 NOTE — CONSULT NOTE ADULT - SUBJECTIVE AND OBJECTIVE BOX
Reason for consult: US    HPI:  32 year old male with Autism (without limitations of functionality and intellect; able to work and sign his own consent per mother who is at bedside)HTN, diabetes mellitus type 2, lymphedema since age 12 that was recently diagnosed who presented to [facility] ED 3/31/22 complaining of SOB and cough x1week. Patient states that he has not taken his Lantus 35units suc bedtime/Lispro 15unites with meals or Lisinopril 40mg po daily as he ran out of refills and has been waiting for an appointment.  He has noted increased abdominal weight with tighter pants along with SOB walking (can not quantify).  Patient states he was about to take off on a flight to [place] when he started having SOB and a cough with productive of clear sputum while on the airplane on the [place].  Flight crew activated EMS and patient was BIBA to [facility]. Patient R/O MI with negative troponins. Labs were remarkable for Cr 1.83 and BNP was 2900 for which he was started on IV Lasix diuresis for acute systolic and diastolic CHF and Ddimer 335 with unremarkable LE dopplers and V/Q scan with very low probability of PE. Denies chest pain, fever, chills, abd pain, N/V/D.   Renal consulted for elevated Cr, likely due to DM2. Started on Mucomyst 1200mg po bid x4 doses in anticipation for cath.  Underwent an transthoracic Echo revealing EF 40-45%, mod global LV systolic dysfunction, mod concentric LV hypertrophy, sev dil LA, no sig valvular disease.   In light of patients cardiac risk factors, symptoms and abnormal noninvasive test findings the patient is now transferred to [facility] 4/4/22 for a cardiac catheterization to evaluate for ischemic etiology of CHF.   MEDS at [facility]: Coreg 3.125mg po BID, ASA 81mg po daily, Lipitor 40mg po daily, lisinopril 40mg po daily, Lantus 40units subcutaneous bedtime, lasix 40mg IV BID, Lispro 15units subc TID meals    Denies fever, cough, chills, headache, flu like symptoms, sick contact or recent travel  COVID PCR not detected on 4/3/22    UA with protein and small blood. Check spot Upr/Cr3  Pt will eventually benefit from ACEi/ARB due to proteinuria and HF, once renal function is stable (post cath).    (04 Apr 2022 14:13)      PAST MEDICAL & SURGICAL HISTORY:  Diabetes mellitus    Hypertension    Acute combined systolic and diastolic congestive heart failure    Morbidly obese    Lymphedema    Autism  without limitations of functionality and intellect; able to work and sign his own consent per mother who is at bedside        FAMILY HISTORY:  Family history of stent (Mother)  mother with coronary stent 49yo        Alochol: Denied  Smoking: Nonsmoker  Drug Use: Denied  Marital Status:         Allergies    Keflex (Hives)    Intolerances        MEDICATIONS  (STANDING):  atorvastatin 40 milliGRAM(s) Oral at bedtime  carvedilol 6.25 milliGRAM(s) Oral every 12 hours  chlorhexidine 2% Cloths 1 Application(s) Topical daily  dextrose 5%. 1000 milliLiter(s) (100 mL/Hr) IV Continuous <Continuous>  dextrose 5%. 1000 milliLiter(s) (50 mL/Hr) IV Continuous <Continuous>  dextrose 50% Injectable 25 Gram(s) IV Push once  dextrose 50% Injectable 12.5 Gram(s) IV Push once  dextrose 50% Injectable 25 Gram(s) IV Push once  glucagon  Injectable 1 milliGRAM(s) IntraMuscular once  heparin   Injectable 5000 Unit(s) SubCutaneous every 12 hours  hydrALAZINE 25 milliGRAM(s) Oral three times a day  insulin glargine Injectable (LANTUS) 40 Unit(s) SubCutaneous at bedtime  insulin lispro (ADMELOG) corrective regimen sliding scale   SubCutaneous at bedtime  insulin lispro (ADMELOG) corrective regimen sliding scale   SubCutaneous three times a day before meals  insulin lispro Injectable (ADMELOG) 15 Unit(s) SubCutaneous three times a day before meals  sodium chloride 0.9% lock flush 3 milliLiter(s) IV Push every 8 hours    MEDICATIONS  (PRN):  dextrose Oral Gel 15 Gram(s) Oral once PRN Blood Glucose LESS THAN 70 milliGRAM(s)/deciliter      ROS  No fever, sweats, chills  No epistaxis, HA, sore throat  No CP, SOB, cough, sputum  No n/v/d, abd pain, melena, hematochezia  No edema  No rash  No anxiety  No back pain, joint pain  No bleeding, bruising  No dysuria, hematuria    T(C): 36.8 (04-08-22 @ 05:09), Max: 36.9 (04-07-22 @ 12:15)  HR: 90 (04-08-22 @ 05:09) (88 - 98)  BP: 137/86 (04-08-22 @ 05:09) (110/76 - 149/87)  RR: 18 (04-08-22 @ 05:09) (18 - 19)  SpO2: 99% (04-08-22 @ 05:09) (99% - 100%)  Wt(kg): --    PE  NAD  Awake, alert  Anicteric, MMM  RRR  CTAB  Abd soft, NT, ND  No c/c/e  No rash grossly  FROM                          9.0    6.73  )-----------( 295      ( 08 Apr 2022 08:42 )             27.2       04-08    138  |  106  |  37<H>  ----------------------------<  81  4.7   |  21<L>  |  1.85<H>    Ca    8.6      08 Apr 2022 08:42  Phos  4.2     04-08  Mg     2.20     04-08    TPro  5.8<L>  /  Alb  x   /  TBili  x   /  DBili  x   /  AST  x   /  ALT  x   /  AlkPhos  x   04-06   Reason for consult: US    HPI:  32 year old male with Autism (without limitations of functionality and intellect; able to work and sign his own consent per mother who is at bedside)HTN, diabetes mellitus type 2, lymphedema since age 12 that was recently diagnosed who presented to [facility] ED 3/31/22 complaining of SOB and cough x1week. Patient states that he has not taken his Lantus 35units suc bedtime/Lispro 15unites with meals or Lisinopril 40mg po daily as he ran out of refills and has been waiting for an appointment.  He has noted increased abdominal weight with tighter pants along with SOB walking (can not quantify).  Patient states he was about to take off on a flight to [place] when he started having SOB and a cough with productive of clear sputum while on the airplane [place] the [place].  Flight crew activated EMS and patient was BIBA to [facility]. Patient R/O MI with negative troponins. Labs were remarkable for Cr 1.83 and BNP was 2900 for which he was started on IV Lasix diuresis for acute systolic and diastolic CHF and Ddimer 335 with unremarkable LE dopplers and V/Q scan with very low probability of PE. Denies chest pain, fever, chills, abd pain, N/V/D.   Renal consulted for elevated Cr, likely due to DM2. Started on Mucomyst 1200mg po bid x4 doses in anticipation for cath.  Underwent an transthoracic Echo revealing EF 40-45%, mod global LV systolic dysfunction, mod concentric LV hypertrophy, sev dil LA, no sig valvular disease.   In light of patients cardiac risk factors, symptoms and abnormal noninvasive test findings the patient is now transferred to [facility] 4/4/22 for a cardiac catheterization to evaluate for ischemic etiology of CHF.   MEDS at [facility]: Coreg 3.125mg po BID, ASA 81mg po daily, Lipitor 40mg po daily, lisinopril 40mg po daily, Lantus 40units subcutaneous bedtime, lasix 40mg IV BID, Lispro 15units subc TID meals    Denies fever, cough, chills, headache, flu like symptoms, sick contact or recent travel  COVID PCR not detected on 4/3/22    UA with protein and small blood. Check spot Upr/Cr3  Pt will eventually benefit from ACEi/ARB due to proteinuria and HF, once renal function is stable (post cath).    (04 Apr 2022 14:13)      Hematology/Oncology consulted on this 32 year old male with hx of Autism, HTN, DM and Lymphedema who presented to [facility] with complaints of SOB and cough who underwent a TTE revealing CHF and transferred to [facility] 4/4 for a cardiac cath and found to have an elevated SPEP.    PAST MEDICAL & SURGICAL HISTORY:  Diabetes mellitus    Hypertension    Acute combined systolic and diastolic congestive heart failure    Morbidly obese    Lymphedema    Autism  without limitations of functionality and intellect; able to work and sign his own consent per mother who is at bedside        FAMILY HISTORY:  Family history of stent (Mother)  mother with coronary stent 51yo        Alcohol Denied  Smoking: Nonsmoker  Drug Use: Denied  Marital Status:         Allergies    Keflex (Hives)    Intolerances        MEDICATIONS  (STANDING):  atorvastatin 40 milliGRAM(s) Oral at bedtime  carvedilol 6.25 milliGRAM(s) Oral every 12 hours  chlorhexidine 2% Cloths 1 Application(s) Topical daily  dextrose 5%. 1000 milliLiter(s) (100 mL/Hr) IV Continuous <Continuous>  dextrose 5%. 1000 milliLiter(s) (50 mL/Hr) IV Continuous <Continuous>  dextrose 50% Injectable 25 Gram(s) IV Push once  dextrose 50% Injectable 12.5 Gram(s) IV Push once  dextrose 50% Injectable 25 Gram(s) IV Push once  glucagon  Injectable 1 milliGRAM(s) IntraMuscular once  heparin   Injectable 5000 Unit(s) SubCutaneous every 12 hours  hydrALAZINE 25 milliGRAM(s) Oral three times a day  insulin glargine Injectable (LANTUS) 40 Unit(s) SubCutaneous at bedtime  insulin lispro (ADMELOG) corrective regimen sliding scale   SubCutaneous at bedtime  insulin lispro (ADMELOG) corrective regimen sliding scale   SubCutaneous three times a day before meals  insulin lispro Injectable (ADMELOG) 15 Unit(s) SubCutaneous three times a day before meals  sodium chloride 0.9% lock flush 3 milliLiter(s) IV Push every 8 hours    MEDICATIONS  (PRN):  dextrose Oral Gel 15 Gram(s) Oral once PRN Blood Glucose LESS THAN 70 milliGRAM(s)/deciliter      ROS  No fever, sweats, chills  No epistaxis, HA, sore throat  No CP, SOB, cough, sputum  No n/v/d, abd pain, melena, hematochezia  No edema  No rash  No anxiety  No back pain, joint pain  No bleeding, bruising  No dysuria, hematuria    T(C): 36.8 (04-08-22 @ 05:09), Max: 36.9 (04-07-22 @ 12:15)  HR: 90 (04-08-22 @ 05:09) (88 - 98)  BP: 137/86 (04-08-22 @ 05:09) (110/76 - 149/87)  RR: 18 (04-08-22 @ 05:09) (18 - 19)  SpO2: 99% (04-08-22 @ 05:09) (99% - 100%)  Wt(kg): --    PE  NAD  Awake, alert  Anicteric, MMM  RRR  CTAB  Abd soft, NT, ND  No c/c/e  No rash grossly                            9.0    6.73  )-----------( 295      ( 08 Apr 2022 08:42 )             27.2       04-08    138  |  106  |  37<H>  ----------------------------<  81  4.7   |  21<L>  |  1.85<H>    Ca    8.6      08 Apr 2022 08:42  Phos  4.2     04-08  Mg     2.20     04-08    TPro  5.8<L>  /  Alb  x   /  TBili  x   /  DBili  x   /  AST  x   /  ALT  x   /  AlkPhos  x   04-06

## 2022-04-08 NOTE — PROGRESS NOTE ADULT - SUBJECTIVE AND OBJECTIVE BOX
Patient is a 32y old  Male who presents with a chief complaint of SOB/Cough (08 Apr 2022 12:13)    PATIENT IS SEEN AND EXAMINED IN MEDICAL FLOOR.  NGT [    ]    SMALL [   ]      GT [   ]    ALLERGIES:  Keflex (Hives)      Daily     Daily     VITALS:    Vital Signs Last 24 Hrs  T(C): 36.8 (08 Apr 2022 05:09), Max: 36.9 (07 Apr 2022 22:15)  T(F): 98.3 (08 Apr 2022 05:09), Max: 98.4 (07 Apr 2022 22:15)  HR: 90 (08 Apr 2022 05:09) (90 - 98)  BP: 137/86 (08 Apr 2022 05:09) (110/76 - 137/86)  BP(mean): --  RR: 18 (08 Apr 2022 05:09) (18 - 18)  SpO2: 99% (08 Apr 2022 05:09) (99% - 99%)    LABS:    CBC Full  -  ( 08 Apr 2022 08:42 )  WBC Count : 6.73 K/uL  RBC Count : 2.85 M/uL  Hemoglobin : 9.0 g/dL  Hematocrit : 27.2 %  Platelet Count - Automated : 295 K/uL  Mean Cell Volume : 95.4 fL  Mean Cell Hemoglobin : 31.6 pg  Mean Cell Hemoglobin Concentration : 33.1 gm/dL  Auto Neutrophil # : x  Auto Lymphocyte # : x  Auto Monocyte # : x  Auto Eosinophil # : x  Auto Basophil # : x  Auto Neutrophil % : x  Auto Lymphocyte % : x  Auto Monocyte % : x  Auto Eosinophil % : x  Auto Basophil % : x      04-08    138  |  106  |  37<H>  ----------------------------<  81  4.7   |  21<L>  |  1.85<H>    Ca    8.6      08 Apr 2022 08:42  Phos  4.2     04-08  Mg     2.20     04-08    TPro  5.8<L>  /  Alb  x   /  TBili  x   /  DBili  x   /  AST  x   /  ALT  x   /  AlkPhos  x   04-06    CAPILLARY BLOOD GLUCOSE      POCT Blood Glucose.: 104 mg/dL (08 Apr 2022 08:34)  POCT Blood Glucose.: 242 mg/dL (07 Apr 2022 21:42)  POCT Blood Glucose.: 86 mg/dL (07 Apr 2022 17:28)        LIVER FUNCTIONS - ( 06 Apr 2022 21:28 )  Alb: x     / Pro: 5.8 g/dL / ALK PHOS: x     / ALT: x     / AST: x     / GGT: x           Creatinine Trend: 1.85<--, 2.37<--, 1.91<--, 1.84<--, 1.64<--, 1.64<--  I&O's Summary    07 Apr 2022 07:01  -  08 Apr 2022 07:00  --------------------------------------------------------  IN: 900 mL / OUT: 550 mL / NET: 350 mL            .Blood Blood-Peripheral  04-04 @ 22:49   No growth to date.  --  --          MEDICATIONS:    MEDICATIONS  (STANDING):  atorvastatin 40 milliGRAM(s) Oral at bedtime  carvedilol 6.25 milliGRAM(s) Oral every 12 hours  chlorhexidine 2% Cloths 1 Application(s) Topical daily  dextrose 5%. 1000 milliLiter(s) (100 mL/Hr) IV Continuous <Continuous>  dextrose 5%. 1000 milliLiter(s) (50 mL/Hr) IV Continuous <Continuous>  dextrose 50% Injectable 25 Gram(s) IV Push once  dextrose 50% Injectable 12.5 Gram(s) IV Push once  dextrose 50% Injectable 25 Gram(s) IV Push once  glucagon  Injectable 1 milliGRAM(s) IntraMuscular once  heparin   Injectable 5000 Unit(s) SubCutaneous every 12 hours  hydrALAZINE 25 milliGRAM(s) Oral three times a day  insulin glargine Injectable (LANTUS) 40 Unit(s) SubCutaneous at bedtime  insulin lispro (ADMELOG) corrective regimen sliding scale   SubCutaneous at bedtime  insulin lispro (ADMELOG) corrective regimen sliding scale   SubCutaneous three times a day before meals  insulin lispro Injectable (ADMELOG) 15 Unit(s) SubCutaneous three times a day before meals  sodium chloride 0.9% lock flush 3 milliLiter(s) IV Push every 8 hours      MEDICATIONS  (PRN):  dextrose Oral Gel 15 Gram(s) Oral once PRN Blood Glucose LESS THAN 70 milliGRAM(s)/deciliter      REVIEW OF SYSTEMS:                           ALL ROS DONE [ X   ]    CONSTITUTIONAL:  LETHARGIC [   ], FEVER [   ], UNRESPONSIVE [   ]  CVS:  CP  [   ], SOB, [   ], PALPITATIONS [   ], DIZZYNESS [   ]  RS: COUGH [   ], SPUTUM [   ]  GI: ABDOMINAL PAIN [   ], NAUSEA [   ], VOMITINGS [   ], DIARRHEA [   ], CONSTIPATION [   ]  :  DYSURIA [   ], NOCTURIA [   ], INCREASED FREQUENCY [   ], DRIBLING [   ],  SKELETAL: PAINFUL JOINTS [   ], SWOLLEN JOINTS [   ], NECK ACHE [   ], LOW BACK ACHE [   ],  SKIN : ULCERS [   ], RASH [   ], ITCHING [   ]  CNS: HEAD ACHE [   ], DOUBLE VISION [   ], BLURRED VISION [   ], AMS / CONFUSION [   ], SEIZURES [   ], WEAKNESS [   ],TINGLING / NUMBNESS [   ]    PHYSICAL EXAMINATION:  GENERAL APPEARANCE: NO DISTRESS  HEENT:  NO PALLOR, NO  JVD,  NO   NODES, NECK SUPPLE  CVS: S1 +, S2 +,   RS: AEEB,  OCCASIONAL  RALES +,   NO RONCHI  ABD: SOFT, NT, NO, BS +  EXT: PE ++    LEFT FOREARM W/ ? SMALL WOUND DISTAL TO ANTECUBITAL FOSSA [UNABLE TO EXPRESS DISCHARGE ]  SKIN: WARM,   SKELETAL:  ROM ACCEPTABLE  CNS:  AAO X 3    RADIOLOGY :    ACC: [id number] EXAM:  US DPLX LWR EXT VEINS COMPL BI                          PROCEDURE DATE:  04/01/2022          INTERPRETATION:  CLINICAL INFORMATION: Shortness of breath. Diabetes.    COMPARISON: None available.    TECHNIQUE: Duplex sonography of the BILATERAL LOWER extremity veins with   color and spectral Doppler, with and without compression. The examination   is technically limited secondary to subcutaneous edema in the calves and   body habitus.    FINDINGS:    RIGHT:  Normal compressibility of the RIGHT common femoral, femoral and popliteal   veins.  Doppler examination shows normal spontaneous and phasic flow.  Calf veins not seen.    Subcutaneous edema in the calf.    LEFT:  Normal compressibility of the LEFT common femoral, femoral and popliteal   veins.  Doppler examination shows normal spontaneous and phasic flow.  Calf veins not seen.    Subcutaneous edema in the calf.    IMPRESSION:    Technically limited study including nonvisualization of the calf veins   with no evidence of deep venous thrombosis in the visualized bilateral   lower extremity veins.    =========================    ACC: [id number] EXAM:  NM PULM VENTILATION PERFUS IMG                          PROCEDURE DATE:  04/01/2022          INTERPRETATION:  CLINICAL INFORMATION: 32 year old male with dyspnea,   elevated serum creatinine level; referred to evaluate for pulmonary   embolism.    RADIOPHARMACEUTICAL: 1.0 mCi Tc-99m-DTPA via inhalation; 6.2 mCi   Tc-99m-MAA, I.V.    TECHNIQUE:  Ventilation and perfusion images of the lungs were obtained   following administration of Tc-99m-DTPA and Tc-99m-MAA. Images were   obtained in the anterior, posterior, both lateral, and all 4 oblique   projections. The study was interpreted in conjunction with a chest   radiograph of 3/31/2022.    COMPARISON: No prior lung V/Q scan.    FINDINGS: There is heterogeneous distribution of radiopharmaceutical in   the lungs on the ventilation and perfusion images. There are no segmental   perfusion defects.    IMPRESSION: Very low probability of pulmonary embolus.      ASSESSMENT :       PLAN:  HPI:  32 year old male with Autism (without limitations of functionality and intellect; able to work and sign his own consent per mother who is at bedside)HTN, diabetes mellitus type 2, lymphedema since age 12 that was recently diagnosed who presented to [facility] ED 3/31/22 complaining of SOB and cough x1week. Patient states that he has not taken his Lantus 35units suc bedtime/Lispro 15unites with meals or Lisinopril 40mg po daily as he ran out of refills and has been waiting for an appointment.  He has noted increased abdominal weight with tighter pants along with SOB walking (can not quantify).  Patient states he was about to take off on a flight to [place] when he started having SOB and a cough with productive of clear sputum while on the airplane on the [employer].  Flight crew activated EMS and patient was BIBA to [facility]. Patient R/O MI with negative troponins. Labs were remarkable for Cr 1.83 and BNP was 2900 for which he was started on IV Lasix diuresis for acute systolic and diastolic CHF and Ddimer 335 with unremarkable LE dopplers and V/Q scan with very low probability of PE. Denies chest pain, fever, chills, abd pain, N/V/D.   Renal consulted for elevated Cr, likely due to DM2. Started on Mucomyst 1200mg po bid x4 doses in anticipation for cath.  Underwent an transthoracic Echo revealing EF 40-45%, mod global LV systolic dysfunction, mod concentric LV hypertrophy, sev dil LA, no sig valvular disease.   In light of patients cardiac risk factors, symptoms and abnormal noninvasive test findings the patient is now transferred to [facility] 4/4/22 for a cardiac catheterization to evaluate for ischemic etiology of CHF.   MEDS at [facility]: Coreg 3.125mg po BID, ASA 81mg po daily, Lipitor 40mg po daily, lisinopril 40mg po daily, Lantus 40units subcutaneous bedtime, lasix 40mg IV BID, Lispro 15units subc TID meals    Denies fever, cough, chills, headache, flu like symptoms, sick contact or recent travel  COVID PCR not detected on 4/3/22    UA with protein and small blood. Check spot Upr/Cr3  Pt will eventually benefit from ACEi/ARB due to proteinuria and HF, once renal function is stable (post cath).    (04 Apr 2022 14:13)    # CASE D/W MOTHER, ALBERTO SIMMONS @ BEDSIDE [4/7] - ALL QUESTIONS ANSWERED    # ? LEFT FOREARM PHLEBITIS   - BCX [NGTD], MONITOR SITE  - WARM COMPRESSES, ARM ELEVATION  - ID CONSULT IN PROGRESS    # SUSPECT NEW ONSET CHF - SYSTOLIC AND DIASTOLIC DYSFUNCTION  # HYPERTENSIVE URGENCY, HTN  # B/L LE EDEMA - SUSPECT S/T CHF AND ?VARICOSE VEINS  - MONITOR ON TELEMETRY, TRENDED TROPONINS  - LASIX, STATIN, COREG  - HOLD ASA  - REVIEWED TSH/LIPID PANEL/HBA1C  - CARDIOLOGY CONSULT    - ECHO - MILD MR, DILATED LA, CONCENTRIC LVH, GLOBAL LV SYSTOLIC DYSFUNCTION, G1DD  - TRANSFERRED TO [facility] FOR CARDIAC CATH    # ELEVATED D-DIMER  - V/Q SCAN - LOW PROBABILITY PE  - DOPPLER LE - NEGATIVE FOR DVT    # SUSANA VS. CKD - REVIEWED UA, MONITOR CR, AVOID NEPHROTOXIC AGENTS  - REVIEWED RENAL U/S  - NEPHROLOGY CONSULT IN PROGRESS    - F/U RENAL BX    - CR WORSENING, DIURETICS HELD    # SUSPECT CAMERON  - OUTPATIENT SLEEP STUDY    # MORBID OBESITY   - NUTRITION CONSULT    # DM2, NONCOMPLIANT W/ INSULIN - HBA1C 9.4 - LANTUS, SSI + FS    - HYPOGLYCEMIA, REDUCING DOSE OF INSULIN, MONITORING FINGERSTICKS    # HLD - STARTED STATIN, REVIEWED LIPID PANEL    # GI AND DVT PPX        Patient is a 32y old  Male who presents with a chief complaint of SOB/Cough (08 Apr 2022 12:13)    PATIENT IS SEEN AND EXAMINED IN MEDICAL FLOOR.  NGT [    ]    SMALL [   ]      GT [   ]    ALLERGIES:  Keflex (Hives)      Daily     Daily     VITALS:    Vital Signs Last 24 Hrs  T(C): 36.8 (08 Apr 2022 05:09), Max: 36.9 (07 Apr 2022 22:15)  T(F): 98.3 (08 Apr 2022 05:09), Max: 98.4 (07 Apr 2022 22:15)  HR: 90 (08 Apr 2022 05:09) (90 - 98)  BP: 137/86 (08 Apr 2022 05:09) (110/76 - 137/86)  BP(mean): --  RR: 18 (08 Apr 2022 05:09) (18 - 18)  SpO2: 99% (08 Apr 2022 05:09) (99% - 99%)    LABS:    CBC Full  -  ( 08 Apr 2022 08:42 )  WBC Count : 6.73 K/uL  RBC Count : 2.85 M/uL  Hemoglobin : 9.0 g/dL  Hematocrit : 27.2 %  Platelet Count - Automated : 295 K/uL  Mean Cell Volume : 95.4 fL  Mean Cell Hemoglobin : 31.6 pg  Mean Cell Hemoglobin Concentration : 33.1 gm/dL  Auto Neutrophil # : x  Auto Lymphocyte # : x  Auto Monocyte # : x  Auto Eosinophil # : x  Auto Basophil # : x  Auto Neutrophil % : x  Auto Lymphocyte % : x  Auto Monocyte % : x  Auto Eosinophil % : x  Auto Basophil % : x      04-08    138  |  106  |  37<H>  ----------------------------<  81  4.7   |  21<L>  |  1.85<H>    Ca    8.6      08 Apr 2022 08:42  Phos  4.2     04-08  Mg     2.20     04-08    TPro  5.8<L>  /  Alb  x   /  TBili  x   /  DBili  x   /  AST  x   /  ALT  x   /  AlkPhos  x   04-06    CAPILLARY BLOOD GLUCOSE      POCT Blood Glucose.: 104 mg/dL (08 Apr 2022 08:34)  POCT Blood Glucose.: 242 mg/dL (07 Apr 2022 21:42)  POCT Blood Glucose.: 86 mg/dL (07 Apr 2022 17:28)        LIVER FUNCTIONS - ( 06 Apr 2022 21:28 )  Alb: x     / Pro: 5.8 g/dL / ALK PHOS: x     / ALT: x     / AST: x     / GGT: x           Creatinine Trend: 1.85<--, 2.37<--, 1.91<--, 1.84<--, 1.64<--, 1.64<--  I&O's Summary    07 Apr 2022 07:01  -  08 Apr 2022 07:00  --------------------------------------------------------  IN: 900 mL / OUT: 550 mL / NET: 350 mL            .Blood Blood-Peripheral  04-04 @ 22:49   No growth to date.  --  --          MEDICATIONS:    MEDICATIONS  (STANDING):  atorvastatin 40 milliGRAM(s) Oral at bedtime  carvedilol 6.25 milliGRAM(s) Oral every 12 hours  chlorhexidine 2% Cloths 1 Application(s) Topical daily  dextrose 5%. 1000 milliLiter(s) (100 mL/Hr) IV Continuous <Continuous>  dextrose 5%. 1000 milliLiter(s) (50 mL/Hr) IV Continuous <Continuous>  dextrose 50% Injectable 25 Gram(s) IV Push once  dextrose 50% Injectable 12.5 Gram(s) IV Push once  dextrose 50% Injectable 25 Gram(s) IV Push once  glucagon  Injectable 1 milliGRAM(s) IntraMuscular once  heparin   Injectable 5000 Unit(s) SubCutaneous every 12 hours  hydrALAZINE 25 milliGRAM(s) Oral three times a day  insulin glargine Injectable (LANTUS) 40 Unit(s) SubCutaneous at bedtime  insulin lispro (ADMELOG) corrective regimen sliding scale   SubCutaneous at bedtime  insulin lispro (ADMELOG) corrective regimen sliding scale   SubCutaneous three times a day before meals  insulin lispro Injectable (ADMELOG) 15 Unit(s) SubCutaneous three times a day before meals  sodium chloride 0.9% lock flush 3 milliLiter(s) IV Push every 8 hours      MEDICATIONS  (PRN):  dextrose Oral Gel 15 Gram(s) Oral once PRN Blood Glucose LESS THAN 70 milliGRAM(s)/deciliter      REVIEW OF SYSTEMS:                           ALL ROS DONE [ X   ]    CONSTITUTIONAL:  LETHARGIC [   ], FEVER [   ], UNRESPONSIVE [   ]  CVS:  CP  [   ], SOB, [   ], PALPITATIONS [   ], DIZZYNESS [   ]  RS: COUGH [   ], SPUTUM [   ]  GI: ABDOMINAL PAIN [   ], NAUSEA [   ], VOMITINGS [   ], DIARRHEA [   ], CONSTIPATION [   ]  :  DYSURIA [   ], NOCTURIA [   ], INCREASED FREQUENCY [   ], DRIBLING [   ],  SKELETAL: PAINFUL JOINTS [   ], SWOLLEN JOINTS [   ], NECK ACHE [   ], LOW BACK ACHE [   ],  SKIN : ULCERS [   ], RASH [   ], ITCHING [   ]  CNS: HEAD ACHE [   ], DOUBLE VISION [   ], BLURRED VISION [   ], AMS / CONFUSION [   ], SEIZURES [   ], WEAKNESS [   ],TINGLING / NUMBNESS [   ]    PHYSICAL EXAMINATION:  GENERAL APPEARANCE: NO DISTRESS  HEENT:  NO PALLOR, NO  JVD,  NO   NODES, NECK SUPPLE  CVS: S1 +, S2 +,   RS: AEEB,  OCCASIONAL  RALES +,   NO RONCHI  ABD: SOFT, NT, NO, BS +  EXT: PE ++    LEFT FOREARM W/ ? SMALL WOUND DISTAL TO ANTECUBITAL FOSSA [UNABLE TO EXPRESS DISCHARGE ]  SKIN: WARM,   SKELETAL:  ROM ACCEPTABLE  CNS:  AAO X 3    RADIOLOGY :    ACC: [id number] EXAM:  US DPLX LWR EXT VEINS COMPL BI                          PROCEDURE DATE:  04/01/2022          INTERPRETATION:  CLINICAL INFORMATION: Shortness of breath. Diabetes.    COMPARISON: None available.    TECHNIQUE: Duplex sonography of the BILATERAL LOWER extremity veins with   color and spectral Doppler, with and without compression. The examination   is technically limited secondary to subcutaneous edema in the calves and   body habitus.    FINDINGS:    RIGHT:  Normal compressibility of the RIGHT common femoral, femoral and popliteal   veins.  Doppler examination shows normal spontaneous and phasic flow.  Calf veins not seen.    Subcutaneous edema in the calf.    LEFT:  Normal compressibility of the LEFT common femoral, femoral and popliteal   veins.  Doppler examination shows normal spontaneous and phasic flow.  Calf veins not seen.    Subcutaneous edema in the calf.    IMPRESSION:    Technically limited study including nonvisualization of the calf veins   with no evidence of deep venous thrombosis in the visualized bilateral   lower extremity veins.    =========================    ACC: [id number] EXAM:  NM PULM VENTILATION PERFUS IMG                          PROCEDURE DATE:  04/01/2022          INTERPRETATION:  CLINICAL INFORMATION: 32 year old male with dyspnea,   elevated serum creatinine level; referred to evaluate for pulmonary   embolism.    RADIOPHARMACEUTICAL: 1.0 mCi Tc-99m-DTPA via inhalation; 6.2 mCi   Tc-99m-MAA, I.V.    TECHNIQUE:  Ventilation and perfusion images of the lungs were obtained   following administration of Tc-99m-DTPA and Tc-99m-MAA. Images were   obtained in the anterior, posterior, both lateral, and all 4 oblique   projections. The study was interpreted in conjunction with a chest   radiograph of 3/31/2022.    COMPARISON: No prior lung V/Q scan.    FINDINGS: There is heterogeneous distribution of radiopharmaceutical in   the lungs on the ventilation and perfusion images. There are no segmental   perfusion defects.    IMPRESSION: Very low probability of pulmonary embolus.      ASSESSMENT :       PLAN:  HPI:  32 year old male with Autism (without limitations of functionality and intellect; able to work and sign his own consent per mother who is at bedside)HTN, diabetes mellitus type 2, lymphedema since age 12 that was recently diagnosed who presented to [facility] ED 3/31/22 complaining of SOB and cough x1week. Patient states that he has not taken his Lantus 35units suc bedtime/Lispro 15unites with meals or Lisinopril 40mg po daily as he ran out of refills and has been waiting for an appointment.  He has noted increased abdominal weight with tighter pants along with SOB walking (can not quantify).  Patient states he was about to take off on a flight to [place] when he started having SOB and a cough with productive of clear sputum while on the airplane on the [employer].  Flight crew activated EMS and patient was BIBA to [facility]. Patient R/O MI with negative troponins. Labs were remarkable for Cr 1.83 and BNP was 2900 for which he was started on IV Lasix diuresis for acute systolic and diastolic CHF and Ddimer 335 with unremarkable LE dopplers and V/Q scan with very low probability of PE. Denies chest pain, fever, chills, abd pain, N/V/D.   Renal consulted for elevated Cr, likely due to DM2. Started on Mucomyst 1200mg po bid x4 doses in anticipation for cath.  Underwent an transthoracic Echo revealing EF 40-45%, mod global LV systolic dysfunction, mod concentric LV hypertrophy, sev dil LA, no sig valvular disease.   In light of patients cardiac risk factors, symptoms and abnormal noninvasive test findings the patient is now transferred to [facility] 4/4/22 for a cardiac catheterization to evaluate for ischemic etiology of CHF.   MEDS at [facility]: Coreg 3.125mg po BID, ASA 81mg po daily, Lipitor 40mg po daily, lisinopril 40mg po daily, Lantus 40units subcutaneous bedtime, lasix 40mg IV BID, Lispro 15units subc TID meals    Denies fever, cough, chills, headache, flu like symptoms, sick contact or recent travel  COVID PCR not detected on 4/3/22    UA with protein and small blood. Check spot Upr/Cr3  Pt will eventually benefit from ACEi/ARB due to proteinuria and HF, once renal function is stable (post cath).    (04 Apr 2022 14:13)    # CASE D/W MOTHER, ALBERTO SIMMONS @ BEDSIDE [4/7] - ALL QUESTIONS ANSWERED    # ? LEFT FOREARM PHLEBITIS   - BCX [NGTD], MONITOR SITE  - WARM COMPRESSES, ARM ELEVATION  - ID CONSULT IN PROGRESS    # SUSPECT NEW ONSET CHF - SYSTOLIC AND DIASTOLIC DYSFUNCTION  # HYPERTENSIVE URGENCY, HTN  # B/L LE EDEMA - SUSPECT S/T CHF AND ?VARICOSE VEINS  - MONITOR ON TELEMETRY, TRENDED TROPONINS  - STATIN, COREG; HOLDING DIURETICS  - HOLD ASA  - REVIEWED TSH/LIPID PANEL/HBA1C  - CARDIOLOGY CONSULT    - ECHO - MILD MR, DILATED LA, CONCENTRIC LVH, GLOBAL LV SYSTOLIC DYSFUNCTION, G1DD  - TRANSFERRED TO [facility] FOR CARDIAC CATH    # ELEVATED D-DIMER  - V/Q SCAN - LOW PROBABILITY PE  - DOPPLER LE - NEGATIVE FOR DVT    # POSITIVE SERUM IMMUNOFIXATION  - HEME/ONC CONSULT    # SUSANA VS. CKD - REVIEWED UA, MONITOR CR, AVOID NEPHROTOXIC AGENTS  - REVIEWED RENAL U/S  - NEPHROLOGY CONSULT IN PROGRESS    - F/U RENAL BX    - CR IMPROVING SINCE DIURETICS HELD    # SUSPECT CAMERON  - OUTPATIENT SLEEP STUDY    # MORBID OBESITY   - NUTRITION CONSULT    # DM2, NONCOMPLIANT W/ INSULIN - HBA1C 9.4 - LANTUS, SSI + FS    - HYPOGLYCEMIA, REDUCING DOSE OF INSULIN, MONITORING FINGERSTICKS    # HLD - STARTED STATIN, REVIEWED LIPID PANEL    # GI AND DVT PPX

## 2022-04-08 NOTE — PROGRESS NOTE ADULT - SUBJECTIVE AND OBJECTIVE BOX
[facility] NEPHROLOGY- PROGRESS NOTE    32y Male with history of HTN presents with SOB transferred for cardiac cath. Nephrology consulted for elevated Scr.    REVIEW OF SYSTEMS:  Gen: no changes in weight  Cards: no chest pain  Resp: no dyspnea  GI: no nausea or vomiting or diarrhea  Vascular: + LE edema (chronic)    Keflex (Hives)      Hospital Medications: Medications reviewed      VITALS:  T(F): 98.3 (22 @ 05:09), Max: 98.5 (22 @ 12:15)  HR: 90 (22 @ 05:09)  BP: 137/86 (22 @ 05:09)  RR: 18 (22 @ 05:09)  SpO2: 99% (22 @ 05:09)  Wt(kg): --     @ 07:01  -   @ 07:00  --------------------------------------------------------  IN: 900 mL / OUT: 550 mL / NET: 350 mL        PHYSICAL EXAM:    Gen: NAD, calm  Cards: RRR, +S1/S2, no M/G/R  Resp: CTA B/L  GI: soft, NT/ND, NABS  Vascular: + LE lymphedema      LABS:      138  |  106  |  37<H>  ----------------------------<  81  4.7   |  21<L>  |  1.85<H>    Ca    8.6      2022 08:42  Phos  4.2       Mg     2.20         TPro  5.8<L>  /  Alb      /  TBili      /  DBili      /  AST      /  ALT      /  AlkPhos          Creatinine Trend: 1.85 <--, 2.37 <--, 1.91 <--, 1.84 <--, 1.64 <--, 1.64 <--, 1.63 <--                        9.0    6.73  )-----------( 295      ( 2022 08:42 )             27.2     Urine Studies:  Urinalysis Basic - ( 2022 07:00 )    Color: Light Yellow / Appearance: Clear / S.010 / pH:   Gluc:  / Ketone: Negative  / Bili: Negative / Urobili: <2 mg/dL   Blood:  / Protein: 100 mg/dL / Nitrite: Negative   Leuk Esterase: Negative / RBC: 5 /HPF / WBC 1 /HPF   Sq Epi:  / Non Sq Epi: 0 /HPF / Bacteria: Negative      Creatinine, Random Urine: 94 mg/dL ( @ 07:00)  Protein/Creatinine Ratio Calculation: 1.2 Ratio ( @ 07:00)  Sodium, Random Urine: 95 mmol/L ( @ 22:33)  Creatinine, Random Urine: 62 mg/dL ( @ 22:33)

## 2022-04-08 NOTE — PROGRESS NOTE ADULT - ASSESSMENT
32y Male with history of HTN presents with SOB transferred for cardiac cath. Nephrology consulted for elevated Scr.    1) SUSANA: likely hemodynamically mediated in setting of diuresis and ACE-I use. Scr now improving. UA with microscopic hematuria and subnephrotic range proteinuria. FeUrea low. Renal US negative for obstruction. Avoid nephrotoxins.    2) CKD Unspecified: Patient denies h/o CKD? but will need to confirm baseline with PMD (Dr. Luís Mast in [place]). Suspect patient with underlying diabetic nephropathy given microscopic hematuria and subnephrotic range proteinuria and large kidneys on renal US. Follow up proteinuria and GN serologies (Hep B/C, PLA2R ab, YESSICA, dsDNA, ANCA, anti-GBM ab), Given positive serum ZARA, recommend Heme consultation. Patient planned for IR guided kidney biopsy on Monday. Monitor electrolytes.    3) HTN with CKD: BP improving. Continue with current medications. Holding lisinopril given SUSANA. Monitor BP.    4) LE edema: Improving. Do not suspect lymphedema will improve significantly with diuresis. Hold bumex 1 mg PO daily given SUSANA and plan for cardiac cath on Tuesday however will resume post-cath for maintenance. TTE with moderate global LVSD and diastolic dysfunction. Monitor UO.    If patient planned for cardiac cath, patient educated that he has a 26% risk of developing WANDA and 1% risk of requiring RRT given risk factors of CHF, anemia, DM and CKD. Will start mucomyst 1200 mg PO Q12 hours X 4 doses the evening prior to cath. Defer IVF given moderate global LVSD on TTE.      [facility] NEPHROLOGY  Han Choudhury M.D.  Mekhi Verdin D.O.  Camille Brown M.D.  Lorena Wesley, MSN, ANP-C    Telephone: [phone]  Facsimile: [phone]    [address] [place] [address]

## 2022-04-09 LAB
ANION GAP SERPL CALC-SCNC: 10 MMOL/L — SIGNIFICANT CHANGE UP (ref 7–14)
BUN SERPL-MCNC: 39 MG/DL — HIGH (ref 7–23)
CALCIUM SERPL-MCNC: 8.7 MG/DL — SIGNIFICANT CHANGE UP (ref 8.4–10.5)
CHLORIDE SERPL-SCNC: 104 MMOL/L — SIGNIFICANT CHANGE UP (ref 98–107)
CO2 SERPL-SCNC: 22 MMOL/L — SIGNIFICANT CHANGE UP (ref 22–31)
CREAT SERPL-MCNC: 2.05 MG/DL — HIGH (ref 0.5–1.3)
CULTURE RESULTS: SIGNIFICANT CHANGE UP
CULTURE RESULTS: SIGNIFICANT CHANGE UP
DSDNA AB SER-ACNC: <12 IU/ML — SIGNIFICANT CHANGE UP
EGFR: 43 ML/MIN/1.73M2 — LOW
GLUCOSE BLDC GLUCOMTR-MCNC: 130 MG/DL — HIGH (ref 70–99)
GLUCOSE BLDC GLUCOMTR-MCNC: 149 MG/DL — HIGH (ref 70–99)
GLUCOSE BLDC GLUCOMTR-MCNC: 158 MG/DL — HIGH (ref 70–99)
GLUCOSE BLDC GLUCOMTR-MCNC: 176 MG/DL — HIGH (ref 70–99)
GLUCOSE BLDC GLUCOMTR-MCNC: 89 MG/DL — SIGNIFICANT CHANGE UP (ref 70–99)
GLUCOSE SERPL-MCNC: 121 MG/DL — HIGH (ref 70–99)
HCT VFR BLD CALC: 27.1 % — LOW (ref 39–50)
HGB BLD-MCNC: 8.8 G/DL — LOW (ref 13–17)
MAGNESIUM SERPL-MCNC: 2.4 MG/DL — SIGNIFICANT CHANGE UP (ref 1.6–2.6)
MCHC RBC-ENTMCNC: 31.1 PG — SIGNIFICANT CHANGE UP (ref 27–34)
MCHC RBC-ENTMCNC: 32.5 GM/DL — SIGNIFICANT CHANGE UP (ref 32–36)
MCV RBC AUTO: 95.8 FL — SIGNIFICANT CHANGE UP (ref 80–100)
NRBC # BLD: 0 /100 WBCS — SIGNIFICANT CHANGE UP
NRBC # FLD: 0 K/UL — SIGNIFICANT CHANGE UP
PHOSPHATE SERPL-MCNC: 4.1 MG/DL — SIGNIFICANT CHANGE UP (ref 2.5–4.5)
PLATELET # BLD AUTO: 308 K/UL — SIGNIFICANT CHANGE UP (ref 150–400)
POTASSIUM SERPL-MCNC: 4.8 MMOL/L — SIGNIFICANT CHANGE UP (ref 3.5–5.3)
POTASSIUM SERPL-SCNC: 4.8 MMOL/L — SIGNIFICANT CHANGE UP (ref 3.5–5.3)
RBC # BLD: 2.83 M/UL — LOW (ref 4.2–5.8)
RBC # FLD: 12 % — SIGNIFICANT CHANGE UP (ref 10.3–14.5)
SODIUM SERPL-SCNC: 136 MMOL/L — SIGNIFICANT CHANGE UP (ref 135–145)
SPECIMEN SOURCE: SIGNIFICANT CHANGE UP
SPECIMEN SOURCE: SIGNIFICANT CHANGE UP
WBC # BLD: 7.15 K/UL — SIGNIFICANT CHANGE UP (ref 3.8–10.5)
WBC # FLD AUTO: 7.15 K/UL — SIGNIFICANT CHANGE UP (ref 3.8–10.5)

## 2022-04-09 RX ADMIN — Medication 15 UNIT(S): at 08:53

## 2022-04-09 RX ADMIN — Medication 25 MILLIGRAM(S): at 21:58

## 2022-04-09 RX ADMIN — SODIUM CHLORIDE 3 MILLILITER(S): 9 INJECTION INTRAMUSCULAR; INTRAVENOUS; SUBCUTANEOUS at 22:01

## 2022-04-09 RX ADMIN — CARVEDILOL PHOSPHATE 6.25 MILLIGRAM(S): 80 CAPSULE, EXTENDED RELEASE ORAL at 17:26

## 2022-04-09 RX ADMIN — HEPARIN SODIUM 5000 UNIT(S): 5000 INJECTION INTRAVENOUS; SUBCUTANEOUS at 05:20

## 2022-04-09 RX ADMIN — CHLORHEXIDINE GLUCONATE 1 APPLICATION(S): 213 SOLUTION TOPICAL at 08:54

## 2022-04-09 RX ADMIN — SODIUM CHLORIDE 3 MILLILITER(S): 9 INJECTION INTRAMUSCULAR; INTRAVENOUS; SUBCUTANEOUS at 06:22

## 2022-04-09 RX ADMIN — CARVEDILOL PHOSPHATE 6.25 MILLIGRAM(S): 80 CAPSULE, EXTENDED RELEASE ORAL at 05:20

## 2022-04-09 RX ADMIN — Medication 1: at 12:51

## 2022-04-09 RX ADMIN — Medication 25 MILLIGRAM(S): at 13:26

## 2022-04-09 RX ADMIN — SODIUM CHLORIDE 3 MILLILITER(S): 9 INJECTION INTRAMUSCULAR; INTRAVENOUS; SUBCUTANEOUS at 13:11

## 2022-04-09 RX ADMIN — Medication 15 UNIT(S): at 12:51

## 2022-04-09 RX ADMIN — Medication 25 MILLIGRAM(S): at 05:20

## 2022-04-09 RX ADMIN — INSULIN GLARGINE 40 UNIT(S): 100 INJECTION, SOLUTION SUBCUTANEOUS at 22:11

## 2022-04-09 RX ADMIN — Medication 15 UNIT(S): at 18:02

## 2022-04-09 RX ADMIN — ATORVASTATIN CALCIUM 40 MILLIGRAM(S): 80 TABLET, FILM COATED ORAL at 21:58

## 2022-04-09 NOTE — PROGRESS NOTE ADULT - ASSESSMENT
32y Male with history of HTN presents with SOB transferred for cardiac cath. Nephrology consulted for elevated Scr.    1) SUSANA: likely hemodynamically mediated in setting of diuresis and ACE-I use. Scr was improving with mild elevation. UA with microscopic hematuria and subnephrotic range proteinuria. FeUrea low. Renal US negative for obstruction. Avoid nephrotoxins.    2) CKD Unspecified: Patient denies h/o CKD? but will need to confirm baseline with PMD (Dr. Luís Mast in [place]). Suspect patient with underlying diabetic nephropathy given microscopic hematuria and subnephrotic range proteinuria and large kidneys on renal US. Follow up proteinuria and GN serologies (Hep B/C, PLA2R ab, YESSICA, dsDNA, ANCA, anti-GBM ab), Positive serum ZARA, for which Heme is following; likely inflammatory as per Heme. Patient planned for IR guided kidney biopsy on Monday. Monitor electrolytes.    3) HTN with CKD: BP improving. Continue with current medications. Holding lisinopril given SUSANA. Monitor BP.    4) LE edema: Improving. Do not suspect lymphedema will improve significantly with diuresis. Hold bumex 1 mg PO daily given SUSANA and plan for cardiac cath on Tuesday however will resume post-cath for maintenance. TTE with moderate global LVSD and diastolic dysfunction. Monitor UO.    If patient planned for cardiac cath, patient educated that he has a 26% risk of developing WANDA and 1% risk of requiring RRT given risk factors of CHF, anemia, DM and CKD. Will start mucomyst 1200 mg PO Q12 hours X 4 doses the evening prior to cath. Defer IVF given moderate global LVSD on TTE.      [facility] NEPHROLOGY  Han Choudhury M.D.  Mekhi Verdin D.O.  Camille Brown M.D.  Lorena Wesley, ROLLY, ANP-C    Telephone: [phone]  Facsimile: [phone]    [address] [place] [address]

## 2022-04-09 NOTE — PROGRESS NOTE ADULT - SUBJECTIVE AND OBJECTIVE BOX
C A R D I O L O G Y  **********************************     DATE OF SERVICE: 04-09-22    S: no chest pain or sob;   Review of systems otherwise (-)    Review of Systems:   Constitutional: [ ] fevers, [ ] chills.   Skin: [ ] dry skin. [ ] rashes.  Psychiatric: [ ] depression, [ ] anxiety.   Gastrointestinal: [ ] BRBPR, [ ] melena.   Neurological: [ ] confusion. [ ] seizures. [ ] shuffling gait.   Ears,Nose,Mouth and Throat: [ ] ear pain [ ] sore throat.   Eyes: [ ] diplopia.   Respiratory: [ ] hemoptysis. [ ] shortness of breath  Cardiovascular: See HPI above  Hematologic/Lymphatic: [ ] anemia. [ ] painful nodes. [ ] prolonged bleeding.   Genitourinary: [ ] hematuria. [ ] flank pain.   Endocrine: [ ] significant change in weight. [ ] intolerance to heat and cold.     Review of systems [x ] otherwise negative, [ ] otherwise unable to obtain    FH: no family history of sudden cardiac death in first degree relatives    SH: [ ] tobacco, [ ] alcohol, [ ] drugs    atorvastatin 40 milliGRAM(s) Oral at bedtime  carvedilol 6.25 milliGRAM(s) Oral every 12 hours  chlorhexidine 2% Cloths 1 Application(s) Topical daily  dextrose 5%. 1000 milliLiter(s) IV Continuous <Continuous>  dextrose 5%. 1000 milliLiter(s) IV Continuous <Continuous>  dextrose 50% Injectable 25 Gram(s) IV Push once  dextrose 50% Injectable 12.5 Gram(s) IV Push once  dextrose 50% Injectable 25 Gram(s) IV Push once  dextrose Oral Gel 15 Gram(s) Oral once PRN  glucagon  Injectable 1 milliGRAM(s) IntraMuscular once  heparin   Injectable 5000 Unit(s) SubCutaneous every 12 hours  hydrALAZINE 25 milliGRAM(s) Oral three times a day  insulin glargine Injectable (LANTUS) 40 Unit(s) SubCutaneous at bedtime  insulin lispro (ADMELOG) corrective regimen sliding scale   SubCutaneous three times a day before meals  insulin lispro (ADMELOG) corrective regimen sliding scale   SubCutaneous at bedtime  insulin lispro Injectable (ADMELOG) 15 Unit(s) SubCutaneous three times a day before meals  sodium chloride 0.9% lock flush 3 milliLiter(s) IV Push every 8 hours                            8.8    7.15  )-----------( 308      ( 09 Apr 2022 08:15 )             27.1       04-09    136  |  104  |  39<H>  ----------------------------<  121<H>  4.8   |  22  |  2.05<H>    Ca    8.7      09 Apr 2022 08:15  Phos  4.1     04-09  Mg     2.40     04-09    T(C): 36.8 (04-09-22 @ 11:09), Max: 36.8 (04-09-22 @ 11:09)  HR: 87 (04-09-22 @ 13:20) (87 - 94)  BP: 158/78 (04-09-22 @ 13:20) (114/63 - 158/78)  RR: 19 (04-09-22 @ 11:09) (17 - 19)  SpO2: 99% (04-09-22 @ 11:09) (99% - 100%)    General: Well nourished in no acute distress. Alert and Oriented * 3.   Head: Normocephalic and atraumatic.   Neck: No JVD. No bruits. Supple. Does not appear to be enlarged.   Cardiovascular: + S1,S2 ; RRR Soft systolic murmur at the left lower sternal border. No rubs noted.    Lungs: CTA b/l. No rhonchi, rales or wheezes.   Abdomen: + BS, soft. Non tender. Non distended. No rebound. No guarding.   Extremities: No clubbing/cyanosis/edema.   Neurologic: Moves all four extremities. Full range of motion.   Skin: Warm and moist. The patient's skin has normal elasticity and good skin turgor.   Psychiatric: Appropriate mood and affect.  Musculoskeletal: Normal range of motion, normal strength    TELEMETRY: 	  Sinus     < from: Transthoracic Echocardiogram (04.01.22 @ 07:17) >  CONCLUSIONS:  1. Normal mitral valve. Mild mitral regurgitation.  2. Normal trileaflet aortic valve.  3. Aortic Root: 3.7 cm.  4. Severely dilated left atrium.  LA volume index = 66  cc/m2.  5. Moderate concentric left ventricular hypertrophy.  6. Moderate global left ventricular systolic dysfunction.  7. Grade I diastolic dysfunction (Impaired relaxation,  mild).  8. Normal right atrium.  9. Normal right ventricular size and systolic function  (TAPSE  1.9cm).  10. Unable to estimate RVSP.  11. There is trace tricuspid regurgitation.  12. There is trace pulmonic regurgitation.  13. Small pericardial effusion.  < end of copied text >      ASSESSMENT/PLAN: 	32y  Male  PMH of HTN, HLD, Varicose veins presents to the ED for SOB and cough positive trop elevated BNP found with acute systolic heart failure.    - PT with Elevated Ddimer on admit, VQ scan (-) and lower extremity dopplers (-)   - Echo with moderate LV systolic dysfunction   - s/p IV diuresis, oral diuretics on hold per renal   - cont Coreg  - cath cancelled due to elevated BP and possible IV site infection   - BP has improved after hydralazine  -ID eval appreciated, Blood cx negative  -Renal eval noted, plan for possible renal bx Monday (holding ASA for 5 days)  -Renal bx cannot be done on APT, so therefore will hold off on cardiac cath until after procedure  -cath when medically optimized   -Heme eval called for positive serum immunofixation

## 2022-04-09 NOTE — PROGRESS NOTE ADULT - SUBJECTIVE AND OBJECTIVE BOX
Patient is a 32y old  Male who presents with a chief complaint of SOB (2022 14:32)    PATIENT IS SEEN AND EXAMINED IN MEDICAL FLOOR.  NGT [    ]    SMALL [   ]      GT [   ]    ALLERGIES:  Keflex (Hives)      Daily     Daily Weight in k (2022 05:15)    VITALS:    Vital Signs Last 24 Hrs  T(C): 36.8 (2022 11:09), Max: 36.8 (2022 11:09)  T(F): 98.3 (2022 11:09), Max: 98.3 (2022 11:09)  HR: 87 (2022 13:20) (87 - 94)  BP: 158/78 (2022 13:20) (114/63 - 158/78)  BP(mean): --  RR: 19 (2022 11:09) (17 - 19)  SpO2: 99% (2022 11:09) (99% - 100%)    LABS:    CBC Full  -  ( 2022 08:15 )  WBC Count : 7.15 K/uL  RBC Count : 2.83 M/uL  Hemoglobin : 8.8 g/dL  Hematocrit : 27.1 %  Platelet Count - Automated : 308 K/uL  Mean Cell Volume : 95.8 fL  Mean Cell Hemoglobin : 31.1 pg  Mean Cell Hemoglobin Concentration : 32.5 gm/dL  Auto Neutrophil # : x  Auto Lymphocyte # : x  Auto Monocyte # : x  Auto Eosinophil # : x  Auto Basophil # : x  Auto Neutrophil % : x  Auto Lymphocyte % : x  Auto Monocyte % : x  Auto Eosinophil % : x  Auto Basophil % : x      -    136  |  104  |  39<H>  ----------------------------<  121<H>  4.8   |  22  |  2.05<H>    Ca    8.7      2022 08:15  Phos  4.1     04-09  Mg     2.40     04-09      CAPILLARY BLOOD GLUCOSE      POCT Blood Glucose.: 176 mg/dL (2022 12:10)  POCT Blood Glucose.: 149 mg/dL (2022 08:49)  POCT Blood Glucose.: 196 mg/dL (2022 22:06)  POCT Blood Glucose.: 117 mg/dL (2022 17:21)          Creatinine Trend: 2.05<--, 1.85<--, 2.37<--, 1.91<--, 1.84<--, 1.64<--  I&O's Summary    2022 07:01  -  2022 07:00  --------------------------------------------------------  IN: 900 mL / OUT: 700 mL / NET: 200 mL            .Blood Blood-Peripheral  -04 @ 22:49   No growth to date.  --  --          MEDICATIONS:    MEDICATIONS  (STANDING):  atorvastatin 40 milliGRAM(s) Oral at bedtime  carvedilol 6.25 milliGRAM(s) Oral every 12 hours  chlorhexidine 2% Cloths 1 Application(s) Topical daily  dextrose 5%. 1000 milliLiter(s) (100 mL/Hr) IV Continuous <Continuous>  dextrose 5%. 1000 milliLiter(s) (50 mL/Hr) IV Continuous <Continuous>  dextrose 50% Injectable 25 Gram(s) IV Push once  dextrose 50% Injectable 12.5 Gram(s) IV Push once  dextrose 50% Injectable 25 Gram(s) IV Push once  glucagon  Injectable 1 milliGRAM(s) IntraMuscular once  heparin   Injectable 5000 Unit(s) SubCutaneous every 12 hours  hydrALAZINE 25 milliGRAM(s) Oral three times a day  insulin glargine Injectable (LANTUS) 40 Unit(s) SubCutaneous at bedtime  insulin lispro (ADMELOG) corrective regimen sliding scale   SubCutaneous three times a day before meals  insulin lispro (ADMELOG) corrective regimen sliding scale   SubCutaneous at bedtime  insulin lispro Injectable (ADMELOG) 15 Unit(s) SubCutaneous three times a day before meals  sodium chloride 0.9% lock flush 3 milliLiter(s) IV Push every 8 hours      MEDICATIONS  (PRN):  dextrose Oral Gel 15 Gram(s) Oral once PRN Blood Glucose LESS THAN 70 milliGRAM(s)/deciliter      REVIEW OF SYSTEMS:                           ALL ROS DONE [ X   ]    CONSTITUTIONAL:  LETHARGIC [   ], FEVER [   ], UNRESPONSIVE [   ]  CVS:  CP  [   ], SOB, [   ], PALPITATIONS [   ], DIZZYNESS [   ]  RS: COUGH [   ], SPUTUM [   ]  GI: ABDOMINAL PAIN [   ], NAUSEA [   ], VOMITINGS [   ], DIARRHEA [   ], CONSTIPATION [   ]  :  DYSURIA [   ], NOCTURIA [   ], INCREASED FREQUENCY [   ], DRIBLING [   ],  SKELETAL: PAINFUL JOINTS [   ], SWOLLEN JOINTS [   ], NECK ACHE [   ], LOW BACK ACHE [   ],  SKIN : ULCERS [   ], RASH [   ], ITCHING [   ]  CNS: HEAD ACHE [   ], DOUBLE VISION [   ], BLURRED VISION [   ], AMS / CONFUSION [   ], SEIZURES [   ], WEAKNESS [   ],TINGLING / NUMBNESS [   ]    PHYSICAL EXAMINATION:  GENERAL APPEARANCE: NO DISTRESS  HEENT:  NO PALLOR, NO  JVD,  NO   NODES, NECK SUPPLE  CVS: S1 +, S2 +,   RS: AEEB,  OCCASIONAL  RALES +,   NO RONCHI  ABD: SOFT, NT, NO, BS +  EXT: PE ++    LEFT FOREARM W/ ? SMALL WOUND DISTAL TO ANTECUBITAL FOSSA [UNABLE TO EXPRESS DISCHARGE ]  SKIN: WARM,   SKELETAL:  ROM ACCEPTABLE  CNS:  AAO X 3    RADIOLOGY :    ACC: [id number] EXAM:  US DPLX LWR EXT VEINS COMPL BI                          PROCEDURE DATE:  2022          INTERPRETATION:  CLINICAL INFORMATION: Shortness of breath. Diabetes.    COMPARISON: None available.    TECHNIQUE: Duplex sonography of the BILATERAL LOWER extremity veins with   color and spectral Doppler, with and without compression. The examination   is technically limited secondary to subcutaneous edema in the calves and   body habitus.    FINDINGS:    RIGHT:  Normal compressibility of the RIGHT common femoral, femoral and popliteal   veins.  Doppler examination shows normal spontaneous and phasic flow.  Calf veins not seen.    Subcutaneous edema in the calf.    LEFT:  Normal compressibility of the LEFT common femoral, femoral and popliteal   veins.  Doppler examination shows normal spontaneous and phasic flow.  Calf veins not seen.    Subcutaneous edema in the calf.    IMPRESSION:    Technically limited study including nonvisualization of the calf veins   with no evidence of deep venous thrombosis in the visualized bilateral   lower extremity veins.    =========================    ACC: [id number] EXAM:  NM PULM VENTILATION PERFUS IMG                          PROCEDURE DATE:  2022          INTERPRETATION:  CLINICAL INFORMATION: 32 year old male with dyspnea,   elevated serum creatinine level; referred to evaluate for pulmonary   embolism.    RADIOPHARMACEUTICAL: 1.0 mCi Tc-99m-DTPA via inhalation; 6.2 mCi   Tc-99m-MAA, I.V.    TECHNIQUE:  Ventilation and perfusion images of the lungs were obtained   following administration of Tc-99m-DTPA and Tc-99m-MAA. Images were   obtained in the anterior, posterior, both lateral, and all 4 oblique   projections. The study was interpreted in conjunction with a chest   radiograph of 3/31/2022.    COMPARISON: No prior lung V/Q scan.    FINDINGS: There is heterogeneous distribution of radiopharmaceutical in   the lungs on the ventilation and perfusion images. There are no segmental   perfusion defects.    IMPRESSION: Very low probability of pulmonary embolus.      ASSESSMENT :       PLAN:  HPI:  32 year old male with Autism (without limitations of functionality and intellect; able to work and sign his own consent per mother who is at bedside)HTN, diabetes mellitus type 2, lymphedema since age 12 that was recently diagnosed who presented to [facility] ED 3/31/22 complaining of SOB and cough x1week. Patient states that he has not taken his Lantus 35units suc bedtime/Lispro 15unites with meals or Lisinopril 40mg po daily as he ran out of refills and has been waiting for an appointment.  He has noted increased abdominal weight with tighter pants along with SOB walking (can not quantify).  Patient states he was about to take off on a flight to [place] when he started having SOB and a cough with productive of clear sputum while on the airplane on the [place].  Flight crew activated EMS and patient was BIBA to [facility]. Patient R/O MI with negative troponins. Labs were remarkable for Cr 1.83 and BNP was 2900 for which he was started on IV Lasix diuresis for acute systolic and diastolic CHF and Ddimer 335 with unremarkable LE dopplers and V/Q scan with very low probability of PE. Denies chest pain, fever, chills, abd pain, N/V/D.   Renal consulted for elevated Cr, likely due to DM2. Started on Mucomyst 1200mg po bid x4 doses in anticipation for cath.  Underwent an transthoracic Echo revealing EF 40-45%, mod global LV systolic dysfunction, mod concentric LV hypertrophy, sev dil LA, no sig valvular disease.   In light of patients cardiac risk factors, symptoms and abnormal noninvasive test findings the patient is now transferred to [facility] for a cardiac catheterization to evaluate for ischemic etiology of CHF.   MEDS at [facility]: Coreg 3.125mg po BID, ASA 81mg po daily, Lipitor 40mg po daily, lisinopril 40mg po daily, Lantus 40units subcutaneous bedtime, lasix 40mg IV BID, Lispro 15units subc TID meals    Denies fever, cough, chills, headache, flu like symptoms, sick contact or recent travel  COVID PCR not detected on 4/3/22    UA with protein and small blood. Check spot Upr/Cr3  Pt will eventually benefit from ACEi/ARB due to proteinuria and HF, once renal function is stable (post cath).    (2022 14:13)    # CASE D/W MOTHER, ALBERTO SIMMONS @ BEDSIDE [] - ALL QUESTIONS ANSWERED    # ? LEFT FOREARM PHLEBITIS   - BCX [NGTD], MONITOR SITE  - WARM COMPRESSES, ARM ELEVATION  - ID CONSULT IN PROGRESS    # SUSPECT NEW ONSET CHF - SYSTOLIC AND DIASTOLIC DYSFUNCTION  # HYPERTENSIVE URGENCY, HTN  # B/L LE EDEMA - SUSPECT S/T CHF AND ?VARICOSE VEINS  - MONITOR ON TELEMETRY, TRENDED TROPONINS  - STATIN, COREG; HOLDING DIURETICS  - HOLD ASA  - REVIEWED TSH/LIPID PANEL/HBA1C  - CARDIOLOGY CONSULT    - ECHO - MILD MR, DILATED LA, CONCENTRIC LVH, GLOBAL LV SYSTOLIC DYSFUNCTION, G1DD  - TRANSFERRED TO [facility] FOR CARDIAC CATH    # ELEVATED D-DIMER  - V/Q SCAN - LOW PROBABILITY PE  - DOPPLER LE - NEGATIVE FOR DVT    # POSITIVE SERUM IMMUNOFIXATION  - HEME/ONC CONSULT    # SUSANA VS. CKD - REVIEWED UA, MONITOR CR, AVOID NEPHROTOXIC AGENTS  - REVIEWED RENAL U/S  - NEPHROLOGY CONSULT IN PROGRESS    - F/U RENAL BX    - CR IMPROVING SINCE DIURETICS HELD    # SUSPECT CAMERON  - OUTPATIENT SLEEP STUDY    # MORBID OBESITY   - NUTRITION CONSULT    # DM2, NONCOMPLIANT W/ INSULIN - HBA1C 9.4 - LANTUS, SSI + FS    - HYPOGLYCEMIA, REDUCING DOSE OF INSULIN, MONITORING FINGERSTICKS    # HLD - STARTED STATIN, REVIEWED LIPID PANEL    # GI AND DVT PPX

## 2022-04-09 NOTE — PROGRESS NOTE ADULT - SUBJECTIVE AND OBJECTIVE BOX
[facility] NEPHROLOGY- PROGRESS NOTE    32y Male with history of HTN presents with SOB transferred for cardiac cath. Nephrology consulted for elevated Scr.    REVIEW OF SYSTEMS:  Gen: no changes in weight  Cards: no chest pain  Resp: no dyspnea  GI: no nausea or vomiting or diarrhea  Vascular: + LE edema (chronic)    Keflex (Hives)      Hospital Medications: Medications reviewed    VITALS:  T(F): 98.3 (22 @ 11:09), Max: 98.3 (22 @ 11:09)  HR: 92 (22 @ 11:09)  BP: 149/78 (22 @ 11:09)  RR: 19 (22 @ 11:09)  SpO2: 99% (22 @ 11:09)  Wt(kg): --     @ 07:01  -   @ 07:00  --------------------------------------------------------  IN: 900 mL / OUT: 700 mL / NET: 200 mL      PHYSICAL EXAM:    Gen: NAD, calm  Cards: RRR, +S1/S2, no M/G/R  Resp: CTA B/L  GI: soft, NT/ND, NABS  Vascular: + LE lymphedema      LABS:      136  |  104  |  39<H>  ----------------------------<  121<H>  4.8   |  22  |  2.05<H>    Ca    8.7      2022 08:15  Phos  4.1       Mg     2.40           Creatinine Trend: 2.05 <--, 1.85 <--, 2.37 <--, 1.91 <--, 1.84 <--, 1.64 <--, 1.64 <--                        8.8    7.15  )-----------( 308      ( 2022 08:15 )             27.1     Urine Studies:  Urinalysis Basic - ( 2022 07:00 )    Color: Light Yellow / Appearance: Clear / S.010 / pH:   Gluc:  / Ketone: Negative  / Bili: Negative / Urobili: <2 mg/dL   Blood:  / Protein: 100 mg/dL / Nitrite: Negative   Leuk Esterase: Negative / RBC: 5 /HPF / WBC 1 /HPF   Sq Epi:  / Non Sq Epi: 0 /HPF / Bacteria: Negative      Creatinine, Random Urine: 94 mg/dL ( @ 07:00)  Protein/Creatinine Ratio Calculation: 1.2 Ratio ( @ 07:00)  Sodium, Random Urine: 95 mmol/L ( @ 22:33)  Creatinine, Random Urine: 62 mg/dL ( @ 22:33)

## 2022-04-10 LAB
ANION GAP SERPL CALC-SCNC: 9 MMOL/L — SIGNIFICANT CHANGE UP (ref 7–14)
BUN SERPL-MCNC: 35 MG/DL — HIGH (ref 7–23)
CALCIUM SERPL-MCNC: 8.5 MG/DL — SIGNIFICANT CHANGE UP (ref 8.4–10.5)
CHLORIDE SERPL-SCNC: 105 MMOL/L — SIGNIFICANT CHANGE UP (ref 98–107)
CO2 SERPL-SCNC: 22 MMOL/L — SIGNIFICANT CHANGE UP (ref 22–31)
CREAT SERPL-MCNC: 1.81 MG/DL — HIGH (ref 0.5–1.3)
EGFR: 50 ML/MIN/1.73M2 — LOW
GLUCOSE BLDC GLUCOMTR-MCNC: 110 MG/DL — HIGH (ref 70–99)
GLUCOSE BLDC GLUCOMTR-MCNC: 132 MG/DL — HIGH (ref 70–99)
GLUCOSE BLDC GLUCOMTR-MCNC: 180 MG/DL — HIGH (ref 70–99)
GLUCOSE BLDC GLUCOMTR-MCNC: 95 MG/DL — SIGNIFICANT CHANGE UP (ref 70–99)
GLUCOSE SERPL-MCNC: 73 MG/DL — SIGNIFICANT CHANGE UP (ref 70–99)
HCT VFR BLD CALC: 24 % — LOW (ref 39–50)
HCT VFR BLD CALC: 24.8 % — LOW (ref 39–50)
HGB BLD-MCNC: 7.9 G/DL — LOW (ref 13–17)
HGB BLD-MCNC: 8.1 G/DL — LOW (ref 13–17)
IGG FLD-MCNC: 912 MG/DL — SIGNIFICANT CHANGE UP (ref 700–1600)
IRON SATN MFR SERPL: 100 UG/DL — SIGNIFICANT CHANGE UP (ref 45–165)
IRON SATN MFR SERPL: 48 % — SIGNIFICANT CHANGE UP (ref 14–50)
MAGNESIUM SERPL-MCNC: 2.4 MG/DL — SIGNIFICANT CHANGE UP (ref 1.6–2.6)
MCHC RBC-ENTMCNC: 30.9 PG — SIGNIFICANT CHANGE UP (ref 27–34)
MCHC RBC-ENTMCNC: 31.2 PG — SIGNIFICANT CHANGE UP (ref 27–34)
MCHC RBC-ENTMCNC: 32.7 GM/DL — SIGNIFICANT CHANGE UP (ref 32–36)
MCHC RBC-ENTMCNC: 32.9 GM/DL — SIGNIFICANT CHANGE UP (ref 32–36)
MCV RBC AUTO: 94.7 FL — SIGNIFICANT CHANGE UP (ref 80–100)
MCV RBC AUTO: 94.9 FL — SIGNIFICANT CHANGE UP (ref 80–100)
NRBC # BLD: 0 /100 WBCS — SIGNIFICANT CHANGE UP
NRBC # BLD: 0 /100 WBCS — SIGNIFICANT CHANGE UP
NRBC # FLD: 0 K/UL — SIGNIFICANT CHANGE UP
NRBC # FLD: 0 K/UL — SIGNIFICANT CHANGE UP
PHOSPHATE SERPL-MCNC: 4.4 MG/DL — SIGNIFICANT CHANGE UP (ref 2.5–4.5)
PLATELET # BLD AUTO: 281 K/UL — SIGNIFICANT CHANGE UP (ref 150–400)
PLATELET # BLD AUTO: 286 K/UL — SIGNIFICANT CHANGE UP (ref 150–400)
POTASSIUM SERPL-MCNC: 4.9 MMOL/L — SIGNIFICANT CHANGE UP (ref 3.5–5.3)
POTASSIUM SERPL-SCNC: 4.9 MMOL/L — SIGNIFICANT CHANGE UP (ref 3.5–5.3)
PROT SERPL-MCNC: 5.6 G/DL — LOW (ref 6–8.3)
RBC # BLD: 2.53 M/UL — LOW (ref 4.2–5.8)
RBC # BLD: 2.62 M/UL — LOW (ref 4.2–5.8)
RBC # FLD: 12 % — SIGNIFICANT CHANGE UP (ref 10.3–14.5)
RBC # FLD: 12 % — SIGNIFICANT CHANGE UP (ref 10.3–14.5)
SARS-COV-2 RNA SPEC QL NAA+PROBE: SIGNIFICANT CHANGE UP
SODIUM SERPL-SCNC: 136 MMOL/L — SIGNIFICANT CHANGE UP (ref 135–145)
TIBC SERPL-MCNC: 207 UG/DL — LOW (ref 220–430)
UIBC SERPL-MCNC: 107 UG/DL — LOW (ref 110–370)
WBC # BLD: 6.47 K/UL — SIGNIFICANT CHANGE UP (ref 3.8–10.5)
WBC # BLD: 6.92 K/UL — SIGNIFICANT CHANGE UP (ref 3.8–10.5)
WBC # FLD AUTO: 6.47 K/UL — SIGNIFICANT CHANGE UP (ref 3.8–10.5)
WBC # FLD AUTO: 6.92 K/UL — SIGNIFICANT CHANGE UP (ref 3.8–10.5)

## 2022-04-10 PROCEDURE — 84165 PROTEIN E-PHORESIS SERUM: CPT | Mod: 26

## 2022-04-10 RX ADMIN — SODIUM CHLORIDE 3 MILLILITER(S): 9 INJECTION INTRAMUSCULAR; INTRAVENOUS; SUBCUTANEOUS at 14:31

## 2022-04-10 RX ADMIN — Medication 25 MILLIGRAM(S): at 05:11

## 2022-04-10 RX ADMIN — Medication 25 MILLIGRAM(S): at 21:11

## 2022-04-10 RX ADMIN — Medication 15 UNIT(S): at 17:30

## 2022-04-10 RX ADMIN — Medication 15 UNIT(S): at 08:48

## 2022-04-10 RX ADMIN — INSULIN GLARGINE 40 UNIT(S): 100 INJECTION, SOLUTION SUBCUTANEOUS at 21:34

## 2022-04-10 RX ADMIN — CHLORHEXIDINE GLUCONATE 1 APPLICATION(S): 213 SOLUTION TOPICAL at 12:39

## 2022-04-10 RX ADMIN — HEPARIN SODIUM 5000 UNIT(S): 5000 INJECTION INTRAVENOUS; SUBCUTANEOUS at 17:30

## 2022-04-10 RX ADMIN — Medication 25 MILLIGRAM(S): at 14:25

## 2022-04-10 RX ADMIN — HEPARIN SODIUM 5000 UNIT(S): 5000 INJECTION INTRAVENOUS; SUBCUTANEOUS at 05:12

## 2022-04-10 RX ADMIN — SODIUM CHLORIDE 3 MILLILITER(S): 9 INJECTION INTRAMUSCULAR; INTRAVENOUS; SUBCUTANEOUS at 06:17

## 2022-04-10 RX ADMIN — CARVEDILOL PHOSPHATE 6.25 MILLIGRAM(S): 80 CAPSULE, EXTENDED RELEASE ORAL at 05:11

## 2022-04-10 RX ADMIN — Medication 1: at 12:37

## 2022-04-10 RX ADMIN — ATORVASTATIN CALCIUM 40 MILLIGRAM(S): 80 TABLET, FILM COATED ORAL at 21:11

## 2022-04-10 RX ADMIN — SODIUM CHLORIDE 3 MILLILITER(S): 9 INJECTION INTRAMUSCULAR; INTRAVENOUS; SUBCUTANEOUS at 21:29

## 2022-04-10 RX ADMIN — CARVEDILOL PHOSPHATE 6.25 MILLIGRAM(S): 80 CAPSULE, EXTENDED RELEASE ORAL at 17:32

## 2022-04-10 RX ADMIN — Medication 15 UNIT(S): at 12:37

## 2022-04-10 NOTE — PROGRESS NOTE ADULT - ASSESSMENT
32y Male with history of HTN presents with SOB transferred for cardiac cath. Nephrology consulted for elevated Scr.    1) SUSANA: likely hemodynamically mediated in setting of diuresis and ACE-I use. Scr stable. UA with microscopic hematuria and subnephrotic range proteinuria. FeUrea low. Renal US negative for obstruction. Avoid nephrotoxins.    2) CKD Unspecified: Patient denies h/o CKD? but will need to confirm baseline with PMD (Dr. Luís Mast in [place]). Suspect patient with underlying diabetic nephropathy given microscopic hematuria and subnephrotic range proteinuria and large kidneys on renal US. Follow up proteinuria and GN serologies (Hep B/C, PLA2R ab, YESSICA, ANCA, anti-GBM ab), Positive serum ZARA, for which Heme is following; likely inflammatory as per Heme. Patient planned for IR guided kidney biopsy on Monday 4/11. Will keep NPO after midnight. Monitor electrolytes.    3) HTN with CKD: BP improving. Continue with current medications. Holding lisinopril given SUSANA. Monitor BP.    4) LE edema: Improving. Do not suspect lymphedema will improve significantly with diuresis. Hold bumex 1 mg PO daily given SUSANA and plan for cardiac cath on Tuesday however will resume post-cath for maintenance. TTE with moderate global LVSD and diastolic dysfunction. Monitor UO.    If patient planned for cardiac cath, patient educated that he has a 26% risk of developing WANDA and 1% risk of requiring RRT given risk factors of CHF, anemia, DM and CKD. Will start mucomyst 1200 mg PO Q12 hours X 4 doses the evening prior to cath. Defer IVF given moderate global LVSD on TTE.      [facility] NEPHROLOGY  Han Choudhury M.D.  ARABELLA Coto.DRE.  Camille Brown M.D.  Lorena Wesley, ROLLY, ANP-C    Telephone: [phone]  Facsimile: [phone]    [address] [place] [address]   32y Male with history of HTN presents with SOB transferred for cardiac cath. Nephrology consulted for elevated Scr.    1) SUSANA: likely hemodynamically mediated in setting of diuresis and ACE-I use. Scr stable. UA with microscopic hematuria and subnephrotic range proteinuria. FeUrea low. Renal US negative for obstruction. Avoid nephrotoxins.    2) CKD Unspecified: Patient denies h/o CKD? but will need to confirm baseline with PMD (Dr. Luís Mast in [place]). Suspect patient with underlying diabetic nephropathy given microscopic hematuria and subnephrotic range proteinuria and large kidneys on renal US. Follow up proteinuria and GN serologies (Hep B/C, PLA2R ab, YESSICA, ANCA, anti-GBM ab), Positive serum ZARA, for which Heme is following; likely inflammatory as per Heme. Patient planned for IR guided kidney biopsy on Monday 4/11. Will keep NPO after midnight. Monitor electrolytes.    3) HTN with CKD: BP improving. Continue with current medications. Holding lisinopril given SUSANA. Monitor BP.    4) LE edema: Improving. Do not suspect lymphedema will improve significantly with diuresis. Hold bumex 1 mg PO daily given SUSANA and plan for cardiac cath on Tuesday however will resume post-cath for maintenance. TTE with moderate global LVSD and diastolic dysfunction. Monitor UO.    If patient planned for cardiac cath, patient educated by Dr. Verdin that he has a 26% risk of developing WANDA and 1% risk of requiring RRT given risk factors of CHF, anemia, DM and CKD. Will start mucomyst 1200 mg PO Q12 hours X 4 doses the evening prior to cath. Defer IVF given moderate global LVSD on TTE.      [facility] NEPHROLOGY  Han Choudhury M.D.  Mekhi Verdin D.O.  Camille Brown M.D.  Lorena eWsley, MSN, ANP-C    Telephone: [phone]  Facsimile: [phone]    [address] [place] [address]

## 2022-04-10 NOTE — PROGRESS NOTE ADULT - SUBJECTIVE AND OBJECTIVE BOX
C A R D I O L O G Y  **********************************     DATE OF SERVICE: 04-10-22    pt seen and examined, no complaints, ROS - .     Review of Systems:   Constitutional: [ ] fevers, [ ] chills.   Skin: [ ] dry skin. [ ] rashes.  Psychiatric: [ ] depression, [ ] anxiety.   Gastrointestinal: [ ] BRBPR, [ ] melena.   Neurological: [ ] confusion. [ ] seizures. [ ] shuffling gait.   Ears,Nose,Mouth and Throat: [ ] ear pain [ ] sore throat.   Eyes: [ ] diplopia.   Respiratory: [ ] hemoptysis. [ ] shortness of breath  Cardiovascular: See HPI above  Hematologic/Lymphatic: [ ] anemia. [ ] painful nodes. [ ] prolonged bleeding.   Genitourinary: [ ] hematuria. [ ] flank pain.   Endocrine: [ ] significant change in weight. [ ] intolerance to heat and cold.     Review of systems [x ] otherwise negative, [ ] otherwise unable to obtain    FH: no family history of sudden cardiac death in first degree relatives    SH: [ ] tobacco, [ ] alcohol, [ ] drugs           atorvastatin 40 milliGRAM(s) Oral at bedtime  carvedilol 6.25 milliGRAM(s) Oral every 12 hours  chlorhexidine 2% Cloths 1 Application(s) Topical daily  dextrose 5%. 1000 milliLiter(s) IV Continuous <Continuous>  dextrose 5%. 1000 milliLiter(s) IV Continuous <Continuous>  dextrose 50% Injectable 25 Gram(s) IV Push once  dextrose 50% Injectable 12.5 Gram(s) IV Push once  dextrose 50% Injectable 25 Gram(s) IV Push once  dextrose Oral Gel 15 Gram(s) Oral once PRN  glucagon  Injectable 1 milliGRAM(s) IntraMuscular once  heparin   Injectable 5000 Unit(s) SubCutaneous every 12 hours  hydrALAZINE 25 milliGRAM(s) Oral three times a day  insulin glargine Injectable (LANTUS) 40 Unit(s) SubCutaneous at bedtime  insulin lispro (ADMELOG) corrective regimen sliding scale   SubCutaneous three times a day before meals  insulin lispro (ADMELOG) corrective regimen sliding scale   SubCutaneous at bedtime  insulin lispro Injectable (ADMELOG) 15 Unit(s) SubCutaneous three times a day before meals  sodium chloride 0.9% lock flush 3 milliLiter(s) IV Push every 8 hours                            7.9    6.92  )-----------( 281      ( 10 Apr 2022 07:10 )             24.0       Hemoglobin: 7.9 g/dL (04-10 @ 07:10)  Hemoglobin: 8.8 g/dL (04-09 @ 08:15)  Hemoglobin: 9.0 g/dL (04-08 @ 08:42)  Hemoglobin: 9.6 g/dL (04-07 @ 17:49)  Hemoglobin: 8.8 g/dL (04-07 @ 07:41)      04-10    136  |  105  |  35<H>  ----------------------------<  73  4.9   |  22  |  1.81<H>    Ca    8.5      10 Apr 2022 07:10  Phos  4.4     04-10  Mg     2.40     04-10    TPro  5.6<L>  /  Alb  x   /  TBili  x   /  DBili  x   /  AST  x   /  ALT  x   /  AlkPhos  x   04-10    Creatinine Trend: 1.81<--, 2.05<--, 1.85<--, 2.37<--, 1.91<--, 1.84<--    COAGS:           T(C): 36.8 (04-10-22 @ 05:05), Max: 36.8 (04-09-22 @ 11:09)  HR: 87 (04-10-22 @ 05:05) (87 - 92)  BP: 141/62 (04-10-22 @ 05:05) (132/62 - 158/78)  RR: 17 (04-10-22 @ 05:05) (17 - 19)  SpO2: 100% (04-10-22 @ 05:05) (98% - 100%)  Wt(kg): --    I&O's Summary    09 Apr 2022 07:01  -  10 Apr 2022 07:00  --------------------------------------------------------  IN: 150 mL / OUT: 1150 mL / NET: -1000 mL        General: Well nourished in no acute distress. Alert and Oriented * 3.   Head: Normocephalic and atraumatic.   Neck: No JVD. No bruits. Supple. Does not appear to be enlarged.   Cardiovascular: + S1,S2 ; RRR Soft systolic murmur at the left lower sternal border. No rubs noted.    Lungs: CTA b/l. No rhonchi, rales or wheezes.   Abdomen: + BS, soft. Non tender. Non distended. No rebound. No guarding.   Extremities: No clubbing/cyanosis/edema.   Neurologic: Moves all four extremities. Full range of motion.   Skin: Warm and moist. The patient's skin has normal elasticity and good skin turgor.   Psychiatric: Appropriate mood and affect.  Musculoskeletal: Normal range of motion, normal strength    TELEMETRY: 	  Sinus     < from: Transthoracic Echocardiogram (04.01.22 @ 07:17) >  CONCLUSIONS:  1. Normal mitral valve. Mild mitral regurgitation.  2. Normal trileaflet aortic valve.  3. Aortic Root: 3.7 cm.  4. Severely dilated left atrium.  LA volume index = 66  cc/m2.  5. Moderate concentric left ventricular hypertrophy.  6. Moderate global left ventricular systolic dysfunction.  7. Grade I diastolic dysfunction (Impaired relaxation,  mild).  8. Normal right atrium.  9. Normal right ventricular size and systolic function  (TAPSE  1.9cm).  10. Unable to estimate RVSP.  11. There is trace tricuspid regurgitation.  12. There is trace pulmonic regurgitation.  13. Small pericardial effusion.  < end of copied text >      ASSESSMENT/PLAN: 	32y  Male  PMH of HTN, HLD, Varicose veins presents to the ED for SOB and cough positive trop elevated BNP found with acute systolic heart failure.    - PT with Elevated Ddimer on admit, VQ scan (-) and lower extremity dopplers (-)   - Echo with moderate LV systolic dysfunction   -  renal follow up , s/p IV diuresis, oral diuretics on hold    - cont Coreg  - BP has improved after hydralazine  -ID eval appreciated, Blood cx negative  -Renal eval noted, plan for possible renal bx Monday (holding ASA for 5 days)  -Renal bx cannot be done on APT, so therefore will hold off on cardiac cath until after procedure  -cath when medically optimized   -Litzy nicolas called for positive serum immunofixation

## 2022-04-10 NOTE — PROGRESS NOTE ADULT - SUBJECTIVE AND OBJECTIVE BOX
Patient is a 32y old  Male who presents with a chief complaint of SOB (10 Apr 2022 08:47)    PATIENT IS SEEN AND EXAMINED IN MEDICAL FLOOR.  NGT [    ]    SMALL [   ]      GT [   ]    ALLERGIES:  Keflex (Hives)      Daily     Daily Weight in k.7 (10 Apr 2022 05:05)    VITALS:    Vital Signs Last 24 Hrs  T(C): 36.7 (10 Apr 2022 14:22), Max: 36.8 (10 Apr 2022 05:05)  T(F): 98.1 (10 Apr 2022 14:22), Max: 98.3 (10 Apr 2022 05:05)  HR: 93 (10 Apr 2022 14:) (87 - 93)  BP: 148/68 (10 Apr 2022 14:) (132/62 - 148/68)  BP(mean): --  RR: 18 (10 Apr 2022 14:) (17 - 18)  SpO2: 99% (10 Apr 2022 14:) (98% - 100%)    LABS:    CBC Full  -  ( 10 Apr 2022 10:51 )  WBC Count : 6.47 K/uL  RBC Count : 2.62 M/uL  Hemoglobin : 8.1 g/dL  Hematocrit : 24.8 %  Platelet Count - Automated : 286 K/uL  Mean Cell Volume : 94.7 fL  Mean Cell Hemoglobin : 30.9 pg  Mean Cell Hemoglobin Concentration : 32.7 gm/dL  Auto Neutrophil # : x  Auto Lymphocyte # : x  Auto Monocyte # : x  Auto Eosinophil # : x  Auto Basophil # : x  Auto Neutrophil % : x  Auto Lymphocyte % : x  Auto Monocyte % : x  Auto Eosinophil % : x  Auto Basophil % : x      04-10    136  |  105  |  35<H>  ----------------------------<  73  4.9   |  22  |  1.81<H>    Ca    8.5      10 Apr 2022 07:10  Phos  4.4     04-10  Mg     2.40     04-10    TPro  5.6<L>  /  Alb  x   /  TBili  x   /  DBili  x   /  AST  x   /  ALT  x   /  AlkPhos  x   04-10    CAPILLARY BLOOD GLUCOSE      POCT Blood Glucose.: 180 mg/dL (10 Apr 2022 12:11)  POCT Blood Glucose.: 95 mg/dL (10 Apr 2022 08:12)  POCT Blood Glucose.: 158 mg/dL (2022 21:44)  POCT Blood Glucose.: 130 mg/dL (2022 17:13)        LIVER FUNCTIONS - ( 10 Apr 2022 07:10 )  Alb: x     / Pro: 5.6 g/dL / ALK PHOS: x     / ALT: x     / AST: x     / GGT: x           Creatinine Trend: 1.81<--, 2.05<--, 1.85<--, 2.37<--, 1.91<--, 1.84<--  I&O's Summary    2022 07:01  -  10 Apr 2022 07:00  --------------------------------------------------------  IN: 150 mL / OUT: 1150 mL / NET: -1000 mL    10 Apr 2022 07:01  -  10 Apr 2022 14:40  --------------------------------------------------------  IN: 230 mL / OUT: 600 mL / NET: -370 mL            .Blood Blood-Venous   @ 19:45   No Growth Final  --  --      .Blood Blood-Peripheral   @ 19:30   No Growth Final  --  --          MEDICATIONS:    MEDICATIONS  (STANDING):  atorvastatin 40 milliGRAM(s) Oral at bedtime  carvedilol 6.25 milliGRAM(s) Oral every 12 hours  chlorhexidine 2% Cloths 1 Application(s) Topical daily  dextrose 5%. 1000 milliLiter(s) (100 mL/Hr) IV Continuous <Continuous>  dextrose 5%. 1000 milliLiter(s) (50 mL/Hr) IV Continuous <Continuous>  dextrose 50% Injectable 25 Gram(s) IV Push once  dextrose 50% Injectable 12.5 Gram(s) IV Push once  dextrose 50% Injectable 25 Gram(s) IV Push once  glucagon  Injectable 1 milliGRAM(s) IntraMuscular once  heparin   Injectable 5000 Unit(s) SubCutaneous every 12 hours  hydrALAZINE 25 milliGRAM(s) Oral three times a day  insulin glargine Injectable (LANTUS) 40 Unit(s) SubCutaneous at bedtime  insulin lispro (ADMELOG) corrective regimen sliding scale   SubCutaneous three times a day before meals  insulin lispro (ADMELOG) corrective regimen sliding scale   SubCutaneous at bedtime  insulin lispro Injectable (ADMELOG) 15 Unit(s) SubCutaneous three times a day before meals  sodium chloride 0.9% lock flush 3 milliLiter(s) IV Push every 8 hours      MEDICATIONS  (PRN):  dextrose Oral Gel 15 Gram(s) Oral once PRN Blood Glucose LESS THAN 70 milliGRAM(s)/deciliter      REVIEW OF SYSTEMS:                           ALL ROS DONE [ X   ]    CONSTITUTIONAL:  LETHARGIC [   ], FEVER [   ], UNRESPONSIVE [   ]  CVS:  CP  [   ], SOB, [   ], PALPITATIONS [   ], DIZZYNESS [   ]  RS: COUGH [   ], SPUTUM [   ]  GI: ABDOMINAL PAIN [   ], NAUSEA [   ], VOMITINGS [   ], DIARRHEA [   ], CONSTIPATION [   ]  :  DYSURIA [   ], NOCTURIA [   ], INCREASED FREQUENCY [   ], DRIBLING [   ],  SKELETAL: PAINFUL JOINTS [   ], SWOLLEN JOINTS [   ], NECK ACHE [   ], LOW BACK ACHE [   ],  SKIN : ULCERS [   ], RASH [   ], ITCHING [   ]  CNS: HEAD ACHE [   ], DOUBLE VISION [   ], BLURRED VISION [   ], AMS / CONFUSION [   ], SEIZURES [   ], WEAKNESS [   ],TINGLING / NUMBNESS [   ]    PHYSICAL EXAMINATION:  GENERAL APPEARANCE: NO DISTRESS  HEENT:  NO PALLOR, NO  JVD,  NO   NODES, NECK SUPPLE  CVS: S1 +, S2 +,   RS: AEEB,  OCCASIONAL  RALES +,   NO RONCHI  ABD: SOFT, NT, NO, BS +  EXT: PE ++    LEFT FOREARM W/ ? SMALL WOUND DISTAL TO ANTECUBITAL FOSSA [UNABLE TO EXPRESS DISCHARGE ]  SKIN: WARM,   SKELETAL:  ROM ACCEPTABLE  CNS:  AAO X 3    RADIOLOGY :    ACC: [id number] EXAM:  US DPLX LWR EXT VEINS COMPL BI                          PROCEDURE DATE:  2022          INTERPRETATION:  CLINICAL INFORMATION: Shortness of breath. Diabetes.    COMPARISON: None available.    TECHNIQUE: Duplex sonography of the BILATERAL LOWER extremity veins with   color and spectral Doppler, with and without compression. The examination   is technically limited secondary to subcutaneous edema in the calves and   body habitus.    FINDINGS:    RIGHT:  Normal compressibility of the RIGHT common femoral, femoral and popliteal   veins.  Doppler examination shows normal spontaneous and phasic flow.  Calf veins not seen.    Subcutaneous edema in the calf.    LEFT:  Normal compressibility of the LEFT common femoral, femoral and popliteal   veins.  Doppler examination shows normal spontaneous and phasic flow.  Calf veins not seen.    Subcutaneous edema in the calf.    IMPRESSION:    Technically limited study including nonvisualization of the calf veins   with no evidence of deep venous thrombosis in the visualized bilateral   lower extremity veins.    =========================    [facility]: [id number] EXAM:  NM PULM VENTILATION PERFUS IMG                          PROCEDURE DATE:  2022          INTERPRETATION:  CLINICAL INFORMATION: 32 year old male with dyspnea,   elevated serum creatinine level; referred to evaluate for pulmonary   embolism.    RADIOPHARMACEUTICAL: 1.0 mCi Tc-99m-DTPA via inhalation; 6.2 mCi   Tc-99m-MAA, I.V.    TECHNIQUE:  Ventilation and perfusion images of the lungs were obtained   following administration of Tc-99m-DTPA and Tc-99m-MAA. Images were   obtained in the anterior, posterior, both lateral, and all 4 oblique   projections. The study was interpreted in conjunction with a chest   radiograph of 3/31/2022.    COMPARISON: No prior lung V/Q scan.    FINDINGS: There is heterogeneous distribution of radiopharmaceutical in   the lungs on the ventilation and perfusion images. There are no segmental   perfusion defects.    IMPRESSION: Very low probability of pulmonary embolus.      ASSESSMENT :       PLAN:  HPI:  32 year old male with Autism (without limitations of functionality and intellect; able to work and sign his own consent per mother who is at bedside)HTN, diabetes mellitus type 2, lymphedema since age 12 that was recently diagnosed who [facility] complaining of SOB and cough x1week. Patient states that he has not taken his Lantus 35units suc bedtime/Lispro 15unites with meals or Lisinopril 40mg po daily as he ran out of refills and has been waiting for an appointment.  He has noted increased abdominal weight with tighter pants along with SOB walking (can not quantify).  Patient states he was about to take off on a flight to [place] when he started having SOB and a cough with productive of clear sputum while on the airplane on the [employer].  Flight crew activated EMS and patient was BIBA to [facility]. Patient R/O MI with negative troponins. Labs were remarkable for Cr 1.83 and BNP was 2900 for which he was started on IV Lasix diuresis for acute systolic and diastolic CHF and Ddimer 335 with unremarkable LE dopplers and V/Q scan with very low probability of PE. Denies chest pain, fever, chills, abd pain, N/V/D.   Renal consulted for elevated Cr, likely due to DM2. Started on Mucomyst 1200mg po bid x4 doses in anticipation for cath.  Underwent an transthoracic Echo revealing EF 40-45%, mod global LV systolic dysfunction, mod concentric LV hypertrophy, sev dil LA, no sig valvular disease.   In light of patients cardiac risk factors, symptoms and abnormal noninvasive test findings the patient is now transferred to [facility] for a cardiac catheterization to evaluate for ischemic etiology of CHF.   MEDS at [facility] access hospital: Coreg 3.125mg po BID, ASA 81mg po daily, Lipitor 40mg po daily, lisinopril 40mg po daily, Lantus 40units subcutaneous bedtime, lasix 40mg IV BID, Lispro 15units subc TID meals    Denies fever, cough, chills, headache, flu like symptoms, sick contact or recent travel  COVID PCR not detected on 4/3/22    UA with protein and small blood. Check spot Upr/Cr3  Pt will eventually benefit from ACEi/ARB due to proteinuria and HF, once renal function is stable (post cath).    (2022 14:13)    # CASE D/W MOTHER, ALBERTO SIMMONS @ BEDSIDE [] - ALL QUESTIONS ANSWERED    # ? LEFT FOREARM PHLEBITIS   - BCX [NGTD], MONITOR SITE  - WARM COMPRESSES, ARM ELEVATION  - ID CONSULT IN PROGRESS    # SUSPECT NEW ONSET CHF - SYSTOLIC AND DIASTOLIC DYSFUNCTION  # HYPERTENSIVE URGENCY, HTN  # B/L LE EDEMA - SUSPECT S/T CHF AND ?VARICOSE VEINS  - MONITOR ON TELEMETRY, TRENDED TROPONINS  - STATIN, COREG; HOLDING DIURETICS  - HOLD ASA  - REVIEWED TSH/LIPID PANEL/HBA1C  - CARDIOLOGY CONSULT    - ECHO - MILD MR, DILATED LA, CONCENTRIC LVH, GLOBAL LV SYSTOLIC DYSFUNCTION, G1DD  - [facility]    # ELEVATED D-DIMER  - V/Q SCAN - LOW PROBABILITY PE  - DOPPLER LE - NEGATIVE FOR DVT    # POSITIVE SERUM IMMUNOFIXATION  - HEME/ONC CONSULT    # SUSANA VS. CKD - REVIEWED UA, MONITOR CR, AVOID NEPHROTOXIC AGENTS  - REVIEWED RENAL U/S  - NEPHROLOGY CONSULT IN PROGRESS    - F/U RENAL BX    - CR IMPROVING SINCE DIURETICS HELD    # SUSPECT CAMERON  - OUTPATIENT SLEEP STUDY    # MORBID OBESITY   - NUTRITION CONSULT    # DM2, NONCOMPLIANT W/ INSULIN - HBA1C 9.4 - LANTUS, SSI + FS    - HYPOGLYCEMIA, REDUCING DOSE OF INSULIN, MONITORING FINGERSTICKS    # HLD - STARTED STATIN, REVIEWED LIPID PANEL    # GI AND DVT PPX

## 2022-04-10 NOTE — PROGRESS NOTE ADULT - SUBJECTIVE AND OBJECTIVE BOX
[facility] NEPHROLOGY- PROGRESS NOTE    32y Male with history of HTN presents with SOB transferred for cardiac cath. Nephrology consulted for elevated Scr.    REVIEW OF SYSTEMS:  Gen: no changes in weight  Cards: no chest pain  Resp: no dyspnea  GI: no nausea or vomiting or diarrhea  Vascular: + LE edema (chronic)    [facility] ([facility])      Hospital Medications: Medications reviewed    VITALS:  T(F): 98.3 (04-10-22 @ 05:05), Max: 98.3 (04-10-22 @ 05:05)  HR: 87 (04-10-22 @ 05:05)  BP: 141/62 (04-10-22 @ 05:05)  RR: 17 (04-10-22 @ 05:05)  SpO2: 100% (04-10-22 @ 05:05)  Wt(kg): --     @ 07:01  -  04-10 @ 07:00  --------------------------------------------------------  IN: 150 mL / OUT: 1150 mL / NET: -1000 mL    04-10 @ 07:01  -  04-10 @ 11:16  --------------------------------------------------------  IN: 230 mL / OUT: 600 mL / NET: -370 mL        PHYSICAL EXAM:    Gen: NAD, calm  Cards: RRR, +S1/S2, no M/G/R  Resp: CTA B/L  GI: soft, NT/ND, NABS  Vascular: + LE lymphedema      LABS:  04-10    136  |  105  |  35<H>  ----------------------------<  73  4.9   |  22  |  1.81<H>    Ca    8.5      10 Apr 2022 07:10  Phos  4.4     04-10  Mg     2.40     10    TPro  5.6<L>  /  Alb      /  TBili      /  DBili      /  AST      /  ALT      /  AlkPhos      04-10    Creatinine Trend: 1.81 <--, 2.05 <--, 1.85 <--, 2.37 <--, 1.91 <--, 1.84 <--, 1.64 <--                        8.1    6.47  )-----------( 286      ( 10 Apr 2022 10:51 )             24.8     Urine Studies:  Urinalysis Basic - ( 2022 07:00 )    Color: Light Yellow / Appearance: Clear / S.010 / pH:   Gluc:  / Ketone: Negative  / Bili: Negative / Urobili: <2 mg/dL   Blood:  / Protein: 100 mg/dL / Nitrite: Negative   Leuk Esterase: Negative / RBC: 5 /HPF / WBC 1 /HPF   Sq Epi:  / Non Sq Epi: 0 /HPF / Bacteria: Negative      Creatinine, Random Urine: 94 mg/dL ( @ 07:00)  Protein/Creatinine Ratio Calculation: 1.2 Ratio ( @ 07:00)

## 2022-04-11 LAB
% ALBUMIN: 47.2 % — SIGNIFICANT CHANGE UP
% ALPHA 1: 3.3 % — SIGNIFICANT CHANGE UP
% ALPHA 2: 18.1 % — SIGNIFICANT CHANGE UP
% BETA: 16.7 % — SIGNIFICANT CHANGE UP
% GAMMA: 14.8 % — SIGNIFICANT CHANGE UP
ALBUMIN SERPL ELPH-MCNC: 2.74 G/DL — LOW (ref 3.3–4.4)
ALBUMIN/GLOB SERPL ELPH: 0.9 RATIO — SIGNIFICANT CHANGE UP
ALPHA1 GLOB SERPL ELPH-MCNC: 0.19 G/DL — SIGNIFICANT CHANGE UP (ref 0.1–0.3)
ALPHA2 GLOB SERPL ELPH-MCNC: 1 G/DL — SIGNIFICANT CHANGE UP (ref 0.6–1)
ANION GAP SERPL CALC-SCNC: 10 MMOL/L — SIGNIFICANT CHANGE UP (ref 7–14)
ANION GAP SERPL CALC-SCNC: 9 MMOL/L — SIGNIFICANT CHANGE UP (ref 7–14)
APTT BLD: 34.6 SEC — SIGNIFICANT CHANGE UP (ref 27–36.3)
B-GLOBULIN SERPL ELPH-MCNC: 0.97 G/DL — SIGNIFICANT CHANGE UP (ref 0.6–1.1)
BLD GP AB SCN SERPL QL: NEGATIVE — SIGNIFICANT CHANGE UP
BUN SERPL-MCNC: 35 MG/DL — HIGH (ref 7–23)
BUN SERPL-MCNC: 36 MG/DL — HIGH (ref 7–23)
CALCIUM SERPL-MCNC: 8.8 MG/DL — SIGNIFICANT CHANGE UP (ref 8.4–10.5)
CALCIUM SERPL-MCNC: 8.8 MG/DL — SIGNIFICANT CHANGE UP (ref 8.4–10.5)
CHLORIDE SERPL-SCNC: 104 MMOL/L — SIGNIFICANT CHANGE UP (ref 98–107)
CHLORIDE SERPL-SCNC: 105 MMOL/L — SIGNIFICANT CHANGE UP (ref 98–107)
CO2 SERPL-SCNC: 21 MMOL/L — LOW (ref 22–31)
CO2 SERPL-SCNC: 23 MMOL/L — SIGNIFICANT CHANGE UP (ref 22–31)
CREAT SERPL-MCNC: 1.73 MG/DL — HIGH (ref 0.5–1.3)
CREAT SERPL-MCNC: 1.79 MG/DL — HIGH (ref 0.5–1.3)
EGFR: 51 ML/MIN/1.73M2 — LOW
EGFR: 53 ML/MIN/1.73M2 — LOW
GAMMA GLOBULIN: 0.86 G/DL — SIGNIFICANT CHANGE UP (ref 0.7–1.7)
GBM IGG SER-ACNC: 3 — SIGNIFICANT CHANGE UP (ref 0–20)
GLUCOSE BLDC GLUCOMTR-MCNC: 127 MG/DL — HIGH (ref 70–99)
GLUCOSE BLDC GLUCOMTR-MCNC: 172 MG/DL — HIGH (ref 70–99)
GLUCOSE BLDC GLUCOMTR-MCNC: 79 MG/DL — SIGNIFICANT CHANGE UP (ref 70–99)
GLUCOSE BLDC GLUCOMTR-MCNC: 85 MG/DL — SIGNIFICANT CHANGE UP (ref 70–99)
GLUCOSE BLDC GLUCOMTR-MCNC: 90 MG/DL — SIGNIFICANT CHANGE UP (ref 70–99)
GLUCOSE SERPL-MCNC: 110 MG/DL — HIGH (ref 70–99)
GLUCOSE SERPL-MCNC: 69 MG/DL — LOW (ref 70–99)
HCT VFR BLD CALC: 26.3 % — LOW (ref 39–50)
HGB BLD-MCNC: 8.6 G/DL — LOW (ref 13–17)
INR BLD: 0.98 RATIO — SIGNIFICANT CHANGE UP (ref 0.88–1.16)
MAGNESIUM SERPL-MCNC: 2.4 MG/DL — SIGNIFICANT CHANGE UP (ref 1.6–2.6)
MAGNESIUM SERPL-MCNC: 2.4 MG/DL — SIGNIFICANT CHANGE UP (ref 1.6–2.6)
MCHC RBC-ENTMCNC: 31.7 PG — SIGNIFICANT CHANGE UP (ref 27–34)
MCHC RBC-ENTMCNC: 32.7 GM/DL — SIGNIFICANT CHANGE UP (ref 32–36)
MCV RBC AUTO: 97 FL — SIGNIFICANT CHANGE UP (ref 80–100)
MPO AB + PR3 PNL SER: SIGNIFICANT CHANGE UP
NRBC # BLD: 0 /100 WBCS — SIGNIFICANT CHANGE UP
NRBC # FLD: 0 K/UL — SIGNIFICANT CHANGE UP
PHOSPHATE SERPL-MCNC: 4.7 MG/DL — HIGH (ref 2.5–4.5)
PHOSPHATE SERPL-MCNC: 5.1 MG/DL — HIGH (ref 2.5–4.5)
PLATELET # BLD AUTO: 313 K/UL — SIGNIFICANT CHANGE UP (ref 150–400)
POTASSIUM SERPL-MCNC: 5.5 MMOL/L — HIGH (ref 3.5–5.3)
POTASSIUM SERPL-MCNC: 5.7 MMOL/L — HIGH (ref 3.5–5.3)
POTASSIUM SERPL-SCNC: 5.5 MMOL/L — HIGH (ref 3.5–5.3)
POTASSIUM SERPL-SCNC: 5.7 MMOL/L — HIGH (ref 3.5–5.3)
PROT PATTERN SERPL ELPH-IMP: SIGNIFICANT CHANGE UP
PROT SERPL-MCNC: 5.8 G/DL — SIGNIFICANT CHANGE UP
PROTHROM AB SERPL-ACNC: 11.4 SEC — SIGNIFICANT CHANGE UP (ref 10.5–13.4)
RBC # BLD: 2.71 M/UL — LOW (ref 4.2–5.8)
RBC # FLD: 12.2 % — SIGNIFICANT CHANGE UP (ref 10.3–14.5)
RH IG SCN BLD-IMP: POSITIVE — SIGNIFICANT CHANGE UP
SODIUM SERPL-SCNC: 136 MMOL/L — SIGNIFICANT CHANGE UP (ref 135–145)
SODIUM SERPL-SCNC: 136 MMOL/L — SIGNIFICANT CHANGE UP (ref 135–145)
WBC # BLD: 6.43 K/UL — SIGNIFICANT CHANGE UP (ref 3.8–10.5)
WBC # FLD AUTO: 6.43 K/UL — SIGNIFICANT CHANGE UP (ref 3.8–10.5)

## 2022-04-11 RX ORDER — HYDRALAZINE HCL 50 MG
50 TABLET ORAL THREE TIMES A DAY
Refills: 0 | Status: DISCONTINUED | OUTPATIENT
Start: 2022-04-11 | End: 2022-04-12

## 2022-04-11 RX ORDER — SODIUM ZIRCONIUM CYCLOSILICATE 10 G/10G
10 POWDER, FOR SUSPENSION ORAL ONCE
Refills: 0 | Status: DISCONTINUED | OUTPATIENT
Start: 2022-04-11 | End: 2022-04-11

## 2022-04-11 RX ORDER — SODIUM ZIRCONIUM CYCLOSILICATE 10 G/10G
10 POWDER, FOR SUSPENSION ORAL EVERY 8 HOURS
Refills: 0 | Status: COMPLETED | OUTPATIENT
Start: 2022-04-11 | End: 2022-04-12

## 2022-04-11 RX ADMIN — Medication 1: at 12:49

## 2022-04-11 RX ADMIN — SODIUM ZIRCONIUM CYCLOSILICATE 10 GRAM(S): 10 POWDER, FOR SUSPENSION ORAL at 20:15

## 2022-04-11 RX ADMIN — SODIUM CHLORIDE 3 MILLILITER(S): 9 INJECTION INTRAMUSCULAR; INTRAVENOUS; SUBCUTANEOUS at 13:58

## 2022-04-11 RX ADMIN — CARVEDILOL PHOSPHATE 6.25 MILLIGRAM(S): 80 CAPSULE, EXTENDED RELEASE ORAL at 17:27

## 2022-04-11 RX ADMIN — Medication 50 MILLIGRAM(S): at 22:06

## 2022-04-11 RX ADMIN — HEPARIN SODIUM 5000 UNIT(S): 5000 INJECTION INTRAVENOUS; SUBCUTANEOUS at 05:39

## 2022-04-11 RX ADMIN — SODIUM CHLORIDE 3 MILLILITER(S): 9 INJECTION INTRAMUSCULAR; INTRAVENOUS; SUBCUTANEOUS at 06:17

## 2022-04-11 RX ADMIN — Medication 15 UNIT(S): at 18:08

## 2022-04-11 RX ADMIN — Medication 25 MILLIGRAM(S): at 05:38

## 2022-04-11 RX ADMIN — CHLORHEXIDINE GLUCONATE 1 APPLICATION(S): 213 SOLUTION TOPICAL at 12:50

## 2022-04-11 RX ADMIN — Medication 50 MILLIGRAM(S): at 13:49

## 2022-04-11 RX ADMIN — SODIUM CHLORIDE 3 MILLILITER(S): 9 INJECTION INTRAMUSCULAR; INTRAVENOUS; SUBCUTANEOUS at 21:36

## 2022-04-11 RX ADMIN — INSULIN GLARGINE 40 UNIT(S): 100 INJECTION, SOLUTION SUBCUTANEOUS at 21:26

## 2022-04-11 RX ADMIN — SODIUM ZIRCONIUM CYCLOSILICATE 10 GRAM(S): 10 POWDER, FOR SUSPENSION ORAL at 13:49

## 2022-04-11 RX ADMIN — CARVEDILOL PHOSPHATE 6.25 MILLIGRAM(S): 80 CAPSULE, EXTENDED RELEASE ORAL at 05:38

## 2022-04-11 RX ADMIN — ATORVASTATIN CALCIUM 40 MILLIGRAM(S): 80 TABLET, FILM COATED ORAL at 22:06

## 2022-04-11 RX ADMIN — Medication 15 UNIT(S): at 12:49

## 2022-04-11 NOTE — PROGRESS NOTE ADULT - SUBJECTIVE AND OBJECTIVE BOX
`Patient is a 32y old  Male who presents with a chief complaint of SOB (10 Apr 2022 08:47)    PATIENT IS SEEN AND EXAMINED IN MEDICAL FLOOR.  NGT [    ]    SMALL [   ]      GT [   ]    ALLERGIES:  Keflex (Hives)      Daily     Daily Weight in k.2 (2022 05:35)    VITALS:    Vital Signs Last 24 Hrs  T(C): 37.1 (2022 05:35), Max: 37.1 (2022 05:35)  T(F): 98.7 (2022 05:35), Max: 98.7 (2022 05:35)  HR: 84 (2022 05:35) (84 - 93)  BP: 149/91 (2022 05:35) (140/86 - 149/91)  BP(mean): --  RR: 18 (2022 05:35) (18 - 18)  SpO2: 100% (2022 05:35) (99% - 100%)    LABS:    CBC Full  -  ( 2022 08:20 )  WBC Count : 6.43 K/uL  RBC Count : 2.71 M/uL  Hemoglobin : 8.6 g/dL  Hematocrit : 26.3 %  Platelet Count - Automated : 313 K/uL  Mean Cell Volume : 97.0 fL  Mean Cell Hemoglobin : 31.7 pg  Mean Cell Hemoglobin Concentration : 32.7 gm/dL  Auto Neutrophil # : x  Auto Lymphocyte # : x  Auto Monocyte # : x  Auto Eosinophil # : x  Auto Basophil # : x  Auto Neutrophil % : x  Auto Lymphocyte % : x  Auto Monocyte % : x  Auto Eosinophil % : x  Auto Basophil % : x    PT/INR - ( 2022 08:20 )   PT: 11.4 sec;   INR: 0.98 ratio         PTT - ( 2022 08:20 )  PTT:34.6 sec  04-10    136  |  105  |  35<H>  ----------------------------<  73  4.9   |  22  |  1.81<H>    Ca    8.5      10 Apr 2022 07:10  Phos  4.4     04-10  Mg     2.40     04-10    TPro  5.6<L>  /  Alb  x   /  TBili  x   /  DBili  x   /  AST  x   /  ALT  x   /  AlkPhos  x   04-10    CAPILLARY BLOOD GLUCOSE      POCT Blood Glucose.: 90 mg/dL (2022 08:25)  POCT Blood Glucose.: 79 mg/dL (2022 06:37)  POCT Blood Glucose.: 132 mg/dL (10 Apr 2022 21:17)  POCT Blood Glucose.: 110 mg/dL (10 Apr 2022 17:21)  POCT Blood Glucose.: 180 mg/dL (10 Apr 2022 12:11)        LIVER FUNCTIONS - ( 10 Apr 2022 07:10 )  Alb: x     / Pro: 5.6 g/dL / ALK PHOS: x     / ALT: x     / AST: x     / GGT: x           Creatinine Trend: 1.81<--, 2.05<--, 1.85<--, 2.37<--, 1.91<--, 1.84<--  I&O's Summary    10 Apr 2022 07:01  -  2022 07:00  --------------------------------------------------------  IN: 760 mL / OUT: 2000 mL / NET: -1240 mL            .Blood Blood-Venous   @ 19:45   No Growth Final  --  --      .Blood Blood-Peripheral   @ 19:30   No Growth Final  --  --          MEDICATIONS:    MEDICATIONS  (STANDING):  atorvastatin 40 milliGRAM(s) Oral at bedtime  carvedilol 6.25 milliGRAM(s) Oral every 12 hours  chlorhexidine 2% Cloths 1 Application(s) Topical daily  dextrose 5%. 1000 milliLiter(s) (100 mL/Hr) IV Continuous <Continuous>  dextrose 5%. 1000 milliLiter(s) (50 mL/Hr) IV Continuous <Continuous>  dextrose 50% Injectable 25 Gram(s) IV Push once  dextrose 50% Injectable 25 Gram(s) IV Push once  dextrose 50% Injectable 12.5 Gram(s) IV Push once  glucagon  Injectable 1 milliGRAM(s) IntraMuscular once  heparin   Injectable 5000 Unit(s) SubCutaneous every 12 hours  hydrALAZINE 25 milliGRAM(s) Oral three times a day  insulin glargine Injectable (LANTUS) 40 Unit(s) SubCutaneous at bedtime  insulin lispro (ADMELOG) corrective regimen sliding scale   SubCutaneous three times a day before meals  insulin lispro (ADMELOG) corrective regimen sliding scale   SubCutaneous at bedtime  insulin lispro Injectable (ADMELOG) 15 Unit(s) SubCutaneous three times a day before meals  sodium chloride 0.9% lock flush 3 milliLiter(s) IV Push every 8 hours      MEDICATIONS  (PRN):  dextrose Oral Gel 15 Gram(s) Oral once PRN Blood Glucose LESS THAN 70 milliGRAM(s)/deciliter      REVIEW OF SYSTEMS:                           ALL ROS DONE [ X   ]    CONSTITUTIONAL:  LETHARGIC [   ], FEVER [   ], UNRESPONSIVE [   ]  CVS:  CP  [   ], SOB, [   ], PALPITATIONS [   ], DIZZYNESS [   ]  RS: COUGH [   ], SPUTUM [   ]  GI: ABDOMINAL PAIN [   ], NAUSEA [   ], VOMITINGS [   ], DIARRHEA [   ], CONSTIPATION [   ]  :  DYSURIA [   ], NOCTURIA [   ], INCREASED FREQUENCY [   ], DRIBLING [   ],  SKELETAL: PAINFUL JOINTS [   ], SWOLLEN JOINTS [   ], NECK ACHE [   ], LOW BACK ACHE [   ],  SKIN : ULCERS [   ], RASH [   ], ITCHING [   ]  CNS: HEAD ACHE [   ], DOUBLE VISION [   ], BLURRED VISION [   ], AMS / CONFUSION [   ], SEIZURES [   ], WEAKNESS [   ],TINGLING / NUMBNESS [   ]    PHYSICAL EXAMINATION:  GENERAL APPEARANCE: NO DISTRESS  HEENT:  NO PALLOR, NO  JVD,  NO   NODES, NECK SUPPLE  CVS: S1 +, S2 +,   RS: AEEB,  OCCASIONAL  RALES +,   NO RONCHI  ABD: SOFT, NT, NO, BS +  EXT: PE ++    LEFT FOREARM W/ ? SMALL WOUND DISTAL TO ANTECUBITAL FOSSA [UNABLE TO EXPRESS DISCHARGE ]  SKIN: WARM,   SKELETAL:  ROM ACCEPTABLE  CNS:  AAO X 3    RADIOLOGY :    ACC: [id number] EXAM:  US DPLX LWR EXT VEINS COMPL BI                          PROCEDURE DATE:  2022          INTERPRETATION:  CLINICAL INFORMATION: Shortness of breath. Diabetes.    COMPARISON: None available.    TECHNIQUE: Duplex sonography of the BILATERAL LOWER extremity veins with   color and spectral Doppler, with and without compression. The examination   is technically limited secondary to subcutaneous edema in the calves and   body habitus.    FINDINGS:    RIGHT:  Normal compressibility of the RIGHT common femoral, femoral and popliteal   veins.  Doppler examination shows normal spontaneous and phasic flow.  Calf veins not seen.    Subcutaneous edema in the calf.    LEFT:  Normal compressibility of the LEFT common femoral, femoral and popliteal   veins.  Doppler examination shows normal spontaneous and phasic flow.  Calf veins not seen.    Subcutaneous edema in the calf.    IMPRESSION:    Technically limited study including nonvisualization of the calf veins   with no evidence of deep venous thrombosis in the visualized bilateral   lower extremity veins.    =========================    ACC: [id number] EXAM:  NM PULM VENTILATION PERFUS IMG                          PROCEDURE DATE:  2022          INTERPRETATION:  CLINICAL INFORMATION: 32 year old male with dyspnea,   elevated serum creatinine level; referred to evaluate for pulmonary   embolism.    RADIOPHARMACEUTICAL: 1.0 mCi Tc-99m-DTPA via inhalation; 6.2 mCi   Tc-99m-MAA, I.V.    TECHNIQUE:  Ventilation and perfusion images of the lungs were obtained   following administration of Tc-99m-DTPA and Tc-99m-MAA. Images were   obtained in the anterior, posterior, both lateral, and all 4 oblique   projections. The study was interpreted in conjunction with a chest   radiograph of 3/31/2022.    COMPARISON: No prior lung V/Q scan.    FINDINGS: There is heterogeneous distribution of radiopharmaceutical in   the lungs on the ventilation and perfusion images. There are no segmental   perfusion defects.    IMPRESSION: Very low probability of pulmonary embolus.      ASSESSMENT :       PLAN:  HPI:  32 year old male with Autism (without limitations of functionality and intellect; able to work and sign his own consent per mother who is at bedside)HTN, diabetes mellitus type 2, lymphedema since age 12 that was recently diagnosed who presented to [facility] ED 3/31/22 complaining of SOB and cough x1week. Patient states that he has not taken his Lantus 35units suc bedtime/Lispro 15unites with meals or Lisinopril 40mg po daily as he ran out of refills and has been waiting for an appointment.  He has noted increased abdominal weight with tighter pants along with SOB walking (can not quantify).  Patient states he was about to take off on a flight to [place] when he started having SOB and a cough with productive of clear sputum while on the airplane on the [place].  Flight crew activated EMS and patient was BIBA to [facility]. Patient R/O MI with negative troponins. Labs were remarkable for Cr 1.83 and BNP was 2900 for which he was started on IV Lasix diuresis for acute systolic and diastolic CHF and Ddimer 335 with unremarkable LE dopplers and V/Q scan with very low probability of PE. Denies chest pain, fever, chills, abd pain, N/V/D.   Renal consulted for elevated Cr, likely due to DM2. Started on Mucomyst 1200mg po bid x4 doses in anticipation for cath.  Underwent an transthoracic Echo revealing EF 40-45%, mod global LV systolic dysfunction, mod concentric LV hypertrophy, sev dil LA, no sig valvular disease.   In light of patients cardiac risk factors, symptoms and abnormal noninvasive test findings the patient is now transferred to [facility] 22 for a cardiac catheterization to evaluate for ischemic etiology of CHF.   MEDS at [facility]: Coreg 3.125mg po BID, ASA 81mg po daily, Lipitor 40mg po daily, lisinopril 40mg po daily, Lantus 40units subcutaneous bedtime, lasix 40mg IV BID, Lispro 15units subc TID meals    Denies fever, cough, chills, headache, flu like symptoms, sick contact or recent travel  COVID PCR not detected on 4/3/22    UA with protein and small blood. Check spot Upr/Cr3  Pt will eventually benefit from ACEi/ARB due to proteinuria and HF, once renal function is stable (post cath).    (2022 14:13)    # CASE D/W MOTHER, ALBERTO SIMMONS @ BEDSIDE [] - ALL QUESTIONS ANSWERED    # ? LEFT FOREARM PHLEBITIS   - BCX [NGTD], MONITOR SITE  - WARM COMPRESSES, ARM ELEVATION  - ID CONSULT IN PROGRESS    # SUSPECT NEW ONSET CHF - SYSTOLIC AND DIASTOLIC DYSFUNCTION  # HYPERTENSIVE URGENCY, HTN  # B/L LE EDEMA - SUSPECT S/T CHF AND ?VARICOSE VEINS  - MONITOR ON TELEMETRY, TRENDED TROPONINS  - STATIN, COREG; HOLDING DIURETICS  - HOLD ASA  - REVIEWED TSH/LIPID PANEL/HBA1C  - CARDIOLOGY CONSULT    - ECHO - MILD MR, DILATED LA, CONCENTRIC LVH, GLOBAL LV SYSTOLIC DYSFUNCTION, G1DD  - TRANSFERRED TO [facility] FOR CARDIAC CATH    # ELEVATED D-DIMER  - V/Q SCAN - LOW PROBABILITY PE  - DOPPLER LE - NEGATIVE FOR DVT    # POSITIVE SERUM IMMUNOFIXATION  - HEME/ONC CONSULT    # SUSANA VS. CKD - REVIEWED UA, MONITOR CR, AVOID NEPHROTOXIC AGENTS  - REVIEWED RENAL U/S  - NEPHROLOGY CONSULT IN PROGRESS    - F/U RENAL BX    - CR IMPROVING SINCE DIURETICS HELD    # SUSPECT CAMERON  - OUTPATIENT SLEEP STUDY    # MORBID OBESITY   - NUTRITION CONSULT    # DM2, NONCOMPLIANT W/ INSULIN - HBA1C 9.4 - LANTUS, SSI + FS    - HYPOGLYCEMIA, REDUCING DOSE OF INSULIN, MONITORING FINGERSTICKS    # HLD - STARTED STATIN, REVIEWED LIPID PANEL    # GI AND DVT PPX    Patient is a 32y old  Male who presents with a chief complaint of SOB (10 Apr 2022 08:47)    PATIENT IS SEEN AND EXAMINED IN MEDICAL FLOOR.    ALLERGIES:  Keflex (Hives)      Daily     Daily Weight in k.2 (2022 05:35)    VITALS:    Vital Signs Last 24 Hrs  T(C): 37.1 (2022 05:35), Max: 37.1 (2022 05:35)  T(F): 98.7 (2022 05:35), Max: 98.7 (2022 05:35)  HR: 84 (2022 05:35) (84 - 93)  BP: 149/91 (2022 05:35) (140/86 - 149/91)  BP(mean): --  RR: 18 (2022 05:35) (18 - 18)  SpO2: 100% (2022 05:35) (99% - 100%)    LABS:    CBC Full  -  ( 2022 08:20 )  WBC Count : 6.43 K/uL  RBC Count : 2.71 M/uL  Hemoglobin : 8.6 g/dL  Hematocrit : 26.3 %  Platelet Count - Automated : 313 K/uL  Mean Cell Volume : 97.0 fL  Mean Cell Hemoglobin : 31.7 pg  Mean Cell Hemoglobin Concentration : 32.7 gm/dL  Auto Neutrophil # : x  Auto Lymphocyte # : x  Auto Monocyte # : x  Auto Eosinophil # : x  Auto Basophil # : x  Auto Neutrophil % : x  Auto Lymphocyte % : x  Auto Monocyte % : x  Auto Eosinophil % : x  Auto Basophil % : x    PT/INR - ( 2022 08:20 )   PT: 11.4 sec;   INR: 0.98 ratio         PTT - ( 2022 08:20 )  PTT:34.6 sec  04-10    136  |  105  |  35<H>  ----------------------------<  73  4.9   |  22  |  1.81<H>    Ca    8.5      10 Apr 2022 07:10  Phos  4.4     04-10  Mg     2.40     04-10    TPro  5.6<L>  /  Alb  x   /  TBili  x   /  DBili  x   /  AST  x   /  ALT  x   /  AlkPhos  x   04-10    CAPILLARY BLOOD GLUCOSE      POCT Blood Glucose.: 90 mg/dL (2022 08:25)  POCT Blood Glucose.: 79 mg/dL (2022 06:37)  POCT Blood Glucose.: 132 mg/dL (10 Apr 2022 21:17)  POCT Blood Glucose.: 110 mg/dL (10 Apr 2022 17:21)  POCT Blood Glucose.: 180 mg/dL (10 Apr 2022 12:11)        LIVER FUNCTIONS - ( 10 Apr 2022 07:10 )  Alb: x     / Pro: 5.6 g/dL / ALK PHOS: x     / ALT: x     / AST: x     / GGT: x           Creatinine Trend: 1.81<--, 2.05<--, 1.85<--, 2.37<--, 1.91<--, 1.84<--  I&O's Summary    10 Apr 2022 07:01  -  2022 07:00  --------------------------------------------------------  IN: 760 mL / OUT: 2000 mL / NET: -1240 mL            .Blood Blood-Venous   @ 19:45   No Growth Final  --  --      .Blood Blood-Peripheral   @ 19:30   No Growth Final  --  --          MEDICATIONS:    MEDICATIONS  (STANDING):  atorvastatin 40 milliGRAM(s) Oral at bedtime  carvedilol 6.25 milliGRAM(s) Oral every 12 hours  chlorhexidine 2% Cloths 1 Application(s) Topical daily  dextrose 5%. 1000 milliLiter(s) (100 mL/Hr) IV Continuous <Continuous>  dextrose 5%. 1000 milliLiter(s) (50 mL/Hr) IV Continuous <Continuous>  dextrose 50% Injectable 25 Gram(s) IV Push once  dextrose 50% Injectable 25 Gram(s) IV Push once  dextrose 50% Injectable 12.5 Gram(s) IV Push once  glucagon  Injectable 1 milliGRAM(s) IntraMuscular once  heparin   Injectable 5000 Unit(s) SubCutaneous every 12 hours  hydrALAZINE 25 milliGRAM(s) Oral three times a day  insulin glargine Injectable (LANTUS) 40 Unit(s) SubCutaneous at bedtime  insulin lispro (ADMELOG) corrective regimen sliding scale   SubCutaneous three times a day before meals  insulin lispro (ADMELOG) corrective regimen sliding scale   SubCutaneous at bedtime  insulin lispro Injectable (ADMELOG) 15 Unit(s) SubCutaneous three times a day before meals  sodium chloride 0.9% lock flush 3 milliLiter(s) IV Push every 8 hours      MEDICATIONS  (PRN):  dextrose Oral Gel 15 Gram(s) Oral once PRN Blood Glucose LESS THAN 70 milliGRAM(s)/deciliter      REVIEW OF SYSTEMS:                           ALL ROS DONE [ X   ]    CONSTITUTIONAL:  LETHARGIC [   ], FEVER [   ], UNRESPONSIVE [   ]  CVS:  CP  [   ], SOB, [   ], PALPITATIONS [   ], DIZZYNESS [   ]  RS: COUGH [   ], SPUTUM [   ]  GI: ABDOMINAL PAIN [   ], NAUSEA [   ], VOMITINGS [   ], DIARRHEA [   ], CONSTIPATION [   ]  :  DYSURIA [   ], NOCTURIA [   ], INCREASED FREQUENCY [   ], DRIBLING [   ],  SKELETAL: PAINFUL JOINTS [   ], SWOLLEN JOINTS [   ], NECK ACHE [   ], LOW BACK ACHE [   ],  SKIN : ULCERS [   ], RASH [   ], ITCHING [   ]  CNS: HEAD ACHE [   ], DOUBLE VISION [   ], BLURRED VISION [   ], AMS / CONFUSION [   ], SEIZURES [   ], WEAKNESS [   ],TINGLING / NUMBNESS [   ]    PHYSICAL EXAMINATION:  GENERAL APPEARANCE: NO DISTRESS  HEENT:  NO PALLOR, NO  JVD,  NO   NODES, NECK SUPPLE  CVS: S1 +, S2 +,   RS: AEEB,  OCCASIONAL  RALES +,   NO RONCHI  ABD: SOFT, NT, NO, BS +  EXT: PE ++   LEFT FOREARM WOUND IMPROVED  SKIN: WARM,   SKELETAL:  ROM ACCEPTABLE  CNS:  AAO X 3    RADIOLOGY :    ACC: [id number] EXAM:  US DPLX LWR EXT VEINS COMPL BI                          PROCEDURE DATE:  2022          INTERPRETATION:  CLINICAL INFORMATION: Shortness of breath. Diabetes.    COMPARISON: None available.    TECHNIQUE: Duplex sonography of the BILATERAL LOWER extremity veins with   color and spectral Doppler, with and without compression. The examination   is technically limited secondary to subcutaneous edema in the calves and   body habitus.    FINDINGS:    RIGHT:  Normal compressibility of the RIGHT common femoral, femoral and popliteal   veins.  Doppler examination shows normal spontaneous and phasic flow.  Calf veins not seen.    Subcutaneous edema in the calf.    LEFT:  Normal compressibility of the LEFT common femoral, femoral and popliteal   veins.  Doppler examination shows normal spontaneous and phasic flow.  Calf veins not seen.    Subcutaneous edema in the calf.    IMPRESSION:    Technically limited study including nonvisualization of the calf veins   with no evidence of deep venous thrombosis in the visualized bilateral   lower extremity veins.    =========================    ACC: [id number] EXAM:  NM PULM VENTILATION PERFUS IMG                          PROCEDURE DATE:  2022          INTERPRETATION:  CLINICAL INFORMATION: 32 year old male with dyspnea,   elevated serum creatinine level; referred to evaluate for pulmonary   embolism.    RADIOPHARMACEUTICAL: 1.0 mCi Tc-99m-DTPA via inhalation; 6.2 mCi   Tc-99m-MAA, I.V.    TECHNIQUE:  Ventilation and perfusion images of the lungs were obtained   following administration of Tc-99m-DTPA and Tc-99m-MAA. Images were   obtained in the anterior, posterior, both lateral, and all 4 oblique   projections. The study was interpreted in conjunction with a chest   radiograph of 3/31/2022.    COMPARISON: No prior lung V/Q scan.    FINDINGS: There is heterogeneous distribution of radiopharmaceutical in   the lungs on the ventilation and perfusion images. There are no segmental   perfusion defects.    IMPRESSION: Very low probability of pulmonary embolus.      ASSESSMENT :       PLAN:  HPI:  32 year old male with Autism (without limitations of functionality and intellect; able to work and sign his own consent per mother who is at bedside)HTN, diabetes mellitus type 2, lymphedema since age 12 that was recently diagnosed who presented to [facility] ED 3/31/22 complaining of SOB and cough x1week. Patient states that he has not taken his Lantus 35units suc bedtime/Lispro 15unites with meals or Lisinopril 40mg po daily as he ran out of refills and has been waiting for an appointment.  He has noted increased abdominal weight with tighter pants along with SOB walking (can not quantify).  Patient states he was about to take off on a flight to [place] when he started having SOB and a cough with productive of clear sputum while on the airplane on the [place].  Flight crew activated EMS and patient was BIBA to [facility]. Patient R/O MI with negative troponins. Labs were remarkable for Cr 1.83 and BNP was 2900 for which he was started on IV Lasix diuresis for acute systolic and diastolic CHF and Ddimer 335 with unremarkable LE dopplers and V/Q scan with very low probability of PE. Denies chest pain, fever, chills, abd pain, N/V/D.   Renal consulted for elevated Cr, likely due to DM2. Started on Mucomyst 1200mg po bid x4 doses in anticipation for cath.  Underwent an transthoracic Echo revealing EF 40-45%, mod global LV systolic dysfunction, mod concentric LV hypertrophy, sev dil LA, no sig valvular disease.   In light of patients cardiac risk factors, symptoms and abnormal noninvasive test findings the patient is now transferred to [facility] 22 for a cardiac catheterization to evaluate for ischemic etiology of CHF.   MEDS at [facility]: Coreg 3.125mg po BID, ASA 81mg po daily, Lipitor 40mg po daily, lisinopril 40mg po daily, Lantus 40units subcutaneous bedtime, lasix 40mg IV BID, Lispro 15units subc TID meals    Denies fever, cough, chills, headache, flu like symptoms, sick contact or recent travel  COVID PCR not detected on 4/3/22    UA with protein and small blood. Check spot Upr/Cr3  Pt will eventually benefit from ACEi/ARB due to proteinuria and HF, once renal function is stable (post cath).    (2022 14:13)    # CASE D/W MOTHER, ALBERTO SIMMONS @ BEDSIDE [] - ALL QUESTIONS ANSWERED    # HYPERKALEMIA - PLACED ON LOKELMA, F/U REPEAT POTASSIUM    # ? LEFT FOREARM PHLEBITIS [IMPROVING]  - BCX [NGTD], MONITOR SITE  - WARM COMPRESSES, ARM ELEVATION  - ID CONSULT IN PROGRESS    # SUSPECT NEW ONSET CHF - SYSTOLIC AND DIASTOLIC DYSFUNCTION  # HYPERTENSIVE URGENCY, HTN  # B/L LE EDEMA - SUSPECT S/T CHF AND ?VARICOSE VEINS  - MONITOR ON TELEMETRY, TRENDED TROPONINS  - STATIN, COREG; HOLDING DIURETICS  - HOLD ASA  - REVIEWED TSH/LIPID PANEL/HBA1C  - CARDIOLOGY CONSULT    - ECHO - MILD MR, DILATED LA, CONCENTRIC LVH, GLOBAL LV SYSTOLIC DYSFUNCTION, G1DD  - TRANSFERRED TO [facility] FOR CARDIAC CATH    # ELEVATED D-DIMER  - V/Q SCAN - LOW PROBABILITY PE  - DOPPLER LE - NEGATIVE FOR DVT    # POSITIVE SERUM IMMUNOFIXATION  - HEME/ONC CONSULT    # SUSANA VS. CKD - REVIEWED UA, MONITOR CR, AVOID NEPHROTOXIC AGENTS  - REVIEWED RENAL U/S  - NEPHROLOGY CONSULT IN PROGRESS    - F/U RENAL BX [DELAYED TO , GIVEN HYPERKALEMIA]    - CR IMPROVING SINCE DIURETICS HELD    # SUSPECT CAMERON  - OUTPATIENT SLEEP STUDY    # MORBID OBESITY   - NUTRITION CONSULT    # DM2, NONCOMPLIANT W/ INSULIN - HBA1C 9.4 - LANTUS, SSI + FS    - HYPOGLYCEMIA, REDUCING DOSE OF INSULIN, MONITORING FINGERSTICKS    # HLD - STARTED STATIN, REVIEWED LIPID PANEL    # GI AND DVT PPX

## 2022-04-11 NOTE — PROGRESS NOTE ADULT - ASSESSMENT
Hematology/Oncology consulted on this 32 year old male with hx of Autism, HTN, DM and Lymphedema who presented to [facility] with complaints of SOB and cough who underwent a TTE revealing CHF and transferred to [facility] 4/4 for a cardiac cath and found to have weak iga band..    Elevated SPEP  --ZARA Kappa 9.92  --ZARA Jocelyn 5.43  --Immunofixation with a weak IgA band is present. Polyclonal kappa, Lambda, IgG and IgM seen, this is likely inflammatory  recommend repeating SPEP/ZARA  --rechecking SPEP/UPEP,ZARA,QUIGGS     Anemia  --Hgb 8.6 today  --may be due to renal disease  --If acute drop, please transfuse for a Hgb < 7.0  --Ferritin 585, Iron sat 48%  --checking repeat Iron studies    SUSANA  --Creatinine 1.79 today  --renal biopsy planned for today with IR  --renal following    CHF  --diuretics  --Management per cardiology    Patient may follow with Dr Joseph of [facility] after discharge  Thank you for allowing us to participate in Mr Garcia's care    Lela Berkowitz NP  Hematology/Oncology  [facility]  [phone] (Office)  [phone] (Alt office)  Evenings and weekends please call MD on call or office     Hematology/Oncology consulted on this 32 year old male with hx of Autism, HTN, DM and Lymphedema who presented to [facility] with complaints of SOB and cough who underwent a TTE revealing CHF and transferred to [facility] 4/4 for a cardiac cath and found to have weak iga band..    Elevated SPEP  --ZARA Kappa 9.92  --ZARA Jocelyn 5.43  --Immunofixation with a weak IgA band is present. Polyclonal kappa, Lambda, IgG and IgM seen, this is likely inflammatory  recommend repeating SPEP/ZAAR  --rechecking SPEP/UPEP,ZARA,QUIGGS     Anemia  --Hgb 8.6 today  --may be due to renal disease  --If acute drop, please transfuse for a Hgb < 7.0  --Ferritin 585, Iron sat 48%  --checking repeat Iron studies    SUSANA  --Creatinine 1.79 today  --renal biopsy planned for today with IR  --renal following    CHF  --diuretics  --Management per cardiology    Patient may follow with Dr Joseph of [facility] after discharge  Thank you for allowing us to participate in Mr Garcia's care    Lela Berkowitz NP  Hematology/Oncology  [facility]  [phone] (Office)  [phone] (Alt office)  Evenings and weekends please call MD on call or office    Pending recheck myeloma re-evaluation labs.     Jessie Ferguson D.O  Hematology Oncology   [facility]  [phone] ( Office)   Evenings and weekends please call MD on call or office    Hematology/Oncology consulted on this 32 year old male with hx of Autism, HTN, DM and Lymphedema who presented to [facility] with complaints of SOB and cough who underwent a TTE revealing CHF and transferred to [facility] 4/4 for a cardiac cath and found to have weak iga band..    Elevated SPEP  --ZARA Kappa 9.92  --ZARA Jocelyn 5.43  --Immunofixation with a weak IgA band is present. Polyclonal kappa, Lambda, IgG and IgM seen, this is likely inflammatory  recommend repeating SPEP/ZARA  --rechecking SPEP/UPEP,ZARA,QUIGGS     Anemia  --Hgb 8.6 today  --may be due to renal disease  --If acute drop, please transfuse for a Hgb < 7.0  --Ferritin 585, Iron sat 48%  --checking repeat Iron studies    SUSANA  --Creatinine 1.79 today  --renal biopsy planned for today with IR  --renal following    CHF  --diuretics  --Management per cardiology    Patient may follow with Dr Joseph of [facility] after discharge  Thank you for allowing us to participate in Mr Garcia's care    Lela Berkowitz NP  Hematology/Oncology  [facility]  [phone] (Office)  [phone] (Alt office)  Evenings and weekends please call MD on call or office    Pending recheck myeloma re-evaluation labs. Plan for renal biopsy. Please send congo red staining on biopsy if possible.   - Consider additional imaging if monoclonal IgA persists.     Jessie Ferguson D.O  Hematology Oncology   [facility]  [phone] ( Office)   Evenings and weekends please call MD on call or office

## 2022-04-11 NOTE — PROGRESS NOTE ADULT - SUBJECTIVE AND OBJECTIVE BOX
[facility] NEPHROLOGY- PROGRESS NOTE    32y Male with history of HTN presents with SOB transferred for cardiac cath. Nephrology consulted for elevated Scr.    REVIEW OF SYSTEMS:  Gen: no changes in weight  Cards: no chest pain  Resp: no dyspnea  GI: no nausea or vomiting or diarrhea  Vascular: + LE edema (chronic)    Keflex (Hives)      Hospital Medications: Medications reviewed      VITALS:  T(F): 98.4 (22 @ 11:14), Max: 98.7 (22 @ 05:35)  HR: 88 (22 @ 11:14)  BP: 152/84 (22 @ 11:14)  RR: 17 (22 @ 11:14)  SpO2: 100% (22 @ 11:14)  Wt(kg): --    04-10 @ 07:01  -   @ 07:00  --------------------------------------------------------  IN: 760 mL / OUT: 2000 mL / NET: -1240 mL        PHYSICAL EXAM:    Gen: NAD, calm  Cards: RRR, +S1/S2, no M/G/R  Resp: CTA B/L  GI: soft, NT/ND, NABS  Vascular: + LE lymphedema      LABS:      136  |  104  |  35<H>  ----------------------------<  69<L>  5.7<H>   |  23  |  1.79<H>    Ca    8.8      2022 08:20  Phos  4.7       Mg     2.40         TPro  5.6<L>  /  Alb      /  TBili      /  DBili      /  AST      /  ALT      /  AlkPhos      04-10    Creatinine Trend: 1.79 <--, 1.81 <--, 2.05 <--, 1.85 <--, 2.37 <--, 1.91 <--, 1.84 <--                        8.6    6.43  )-----------( 313      ( 2022 08:20 )             26.3     Urine Studies:  Urinalysis Basic - ( 2022 07:00 )    Color: Light Yellow / Appearance: Clear / S.010 / pH:   Gluc:  / Ketone: Negative  / Bili: Negative / Urobili: <2 mg/dL   Blood:  / Protein: 100 mg/dL / Nitrite: Negative   Leuk Esterase: Negative / RBC: 5 /HPF / WBC 1 /HPF   Sq Epi:  / Non Sq Epi: 0 /HPF / Bacteria: Negative      Creatinine, Random Urine: 94 mg/dL ( @ 07:00)  Protein/Creatinine Ratio Calculation: 1.2 Ratio ( @ 07:00)

## 2022-04-11 NOTE — PROGRESS NOTE ADULT - ASSESSMENT
32y Male with history of HTN presents with SOB transferred for cardiac cath. Nephrology consulted for elevated Scr.    1) SUSANA: likely hemodynamically mediated in setting of diuresis and ACE-I use. Scr improving. UA with microscopic hematuria and subnephrotic range proteinuria. FeUrea low. Renal US negative for obstruction. Avoid nephrotoxins.    2) CKD Unspecified: Patient denies h/o CKD? but will need to confirm baseline with PMD (Dr. Luís Mast in [place]). Suspect patient with underlying diabetic nephropathy given microscopic hematuria and subnephrotic range proteinuria and large kidneys on renal US. Follow up proteinuria and GN serologies (PLA2R ab, ANCA), Patient with positive serum ZARA for which Heme following. YESSICA positive but with negative dsDNA and complements making autoimmune disease unlikely. Patient planned for IR guided kidney biopsy now postponed to 4/12 given hyperkalemia on labs. Monitor electrolytes.    3) HTN with CKD: BP uncontrolled. Increase hydralazine to 50 mg PO TID. Holding lisinopril given SUSANA. Monitor BP.    4) LE edema: Improving. Do not suspect lymphedema will improve significantly with diuresis. Hold bumex 1 mg PO daily given SUSANA and plan for cardiac cath on Wednesday however will resume post-cath for maintenance. TTE with moderate global LVSD and diastolic dysfunction. Monitor UO.    5) Hyperkalemia: Mild (patient admits to eating broccoli and drinking OJ on 4/10). Will treat with lokelma 10 grams PO TID X 3 doses. Change diet to low K. Monitor serum K.    If patient planned for cardiac cath, patient educated that he has a 26% risk of developing WANDA and 1% risk of requiring RRT given risk factors of CHF, anemia, DM and CKD. Will start mucomyst 1200 mg PO Q12 hours X 4 doses the evening prior to cath. Defer IVF given moderate global LVSD on TTE.      [facility] NEPHROLOGY  Han Choudhury M.D.  Mekhi Verdin D.O.  Camille Brown M.D.  Lorena Wesley, MSN, ANP-C    Telephone: [phone]  Facsimile: [phone]    [address] [place] [address]

## 2022-04-11 NOTE — PROGRESS NOTE ADULT - SUBJECTIVE AND OBJECTIVE BOX
C A R D I O L O G Y  **********************************     DATE OF SERVICE: 04-11-22    S: no chest pain or sob;   Review of systems otherwise (-)    Date of service mm/dd/year    chief complaint:    extended hpi:     Review of Systems:   Constitutional: [ ] fevers, [ ] chills.   Skin: [ ] dry skin. [ ] rashes.  Psychiatric: [ ] depression, [ ] anxiety.   Gastrointestinal: [ ] BRBPR, [ ] melena.   Neurological: [ ] confusion. [ ] seizures. [ ] shuffling gait.   Ears,Nose,Mouth and Throat: [ ] ear pain [ ] sore throat.   Eyes: [ ] diplopia.   Respiratory: [ ] hemoptysis. [ ] shortness of breath  Cardiovascular: See HPI above  Hematologic/Lymphatic: [ ] anemia. [ ] painful nodes. [ ] prolonged bleeding.   Genitourinary: [ ] hematuria. [ ] flank pain.   Endocrine: [ ] significant change in weight. [ ] intolerance to heat and cold.     Review of systems [ ] otherwise negative, [ ] otherwise unable to obtain    FH: no family history of sudden cardiac death in first degree relatives    SH: [ ] tobacco, [ ] alcohol, [ ] drugs    atorvastatin 40 milliGRAM(s) Oral at bedtime  carvedilol 6.25 milliGRAM(s) Oral every 12 hours  chlorhexidine 2% Cloths 1 Application(s) Topical daily  dextrose 5%. 1000 milliLiter(s) IV Continuous <Continuous>  dextrose 5%. 1000 milliLiter(s) IV Continuous <Continuous>  dextrose 50% Injectable 25 Gram(s) IV Push once  dextrose 50% Injectable 12.5 Gram(s) IV Push once  dextrose 50% Injectable 25 Gram(s) IV Push once  dextrose Oral Gel 15 Gram(s) Oral once PRN  glucagon  Injectable 1 milliGRAM(s) IntraMuscular once  heparin   Injectable 5000 Unit(s) SubCutaneous every 12 hours  hydrALAZINE 50 milliGRAM(s) Oral three times a day  insulin glargine Injectable (LANTUS) 40 Unit(s) SubCutaneous at bedtime  insulin lispro (ADMELOG) corrective regimen sliding scale   SubCutaneous three times a day before meals  insulin lispro (ADMELOG) corrective regimen sliding scale   SubCutaneous at bedtime  insulin lispro Injectable (ADMELOG) 15 Unit(s) SubCutaneous three times a day before meals  sodium chloride 0.9% lock flush 3 milliLiter(s) IV Push every 8 hours  sodium zirconium cyclosilicate 10 Gram(s) Oral every 8 hours                            8.6    6.43  )-----------( 313      ( 11 Apr 2022 08:20 )             26.3       04-11    136  |  104  |  35<H>  ----------------------------<  69<L>  5.7<H>   |  23  |  1.79<H>    Ca    8.8      11 Apr 2022 08:20  Phos  4.7     04-11  Mg     2.40     04-11    TPro  5.6<L>  /  Alb  x   /  TBili  x   /  DBili  x   /  AST  x   /  ALT  x   /  AlkPhos  x   04-10            T(C): 36.9 (04-11-22 @ 11:14), Max: 37.1 (04-11-22 @ 05:35)  HR: 88 (04-11-22 @ 11:14) (84 - 93)  BP: 152/84 (04-11-22 @ 11:14) (140/86 - 152/84)  RR: 17 (04-11-22 @ 11:14) (17 - 18)  SpO2: 100% (04-11-22 @ 11:14) (99% - 100%)  Wt(kg): --    I&O's Summary    10 Apr 2022 07:01  -  11 Apr 2022 07:00  --------------------------------------------------------  IN: 760 mL / OUT: 2000 mL / NET: -1240 mL    11 Apr 2022 07:01  -  11 Apr 2022 13:08  --------------------------------------------------------  IN: 120 mL / OUT: 500 mL / NET: -380 mL      General: Well nourished in no acute distress. Alert and Oriented * 3.   Head: Normocephalic and atraumatic.   Neck: No JVD. No bruits. Supple. Does not appear to be enlarged.   Cardiovascular: + S1,S2 ; RRR Soft systolic murmur at the left lower sternal border. No rubs noted.    Lungs: CTA b/l. No rhonchi, rales or wheezes.   Abdomen: + BS, soft. Non tender. Non distended. No rebound. No guarding.   Extremities: No clubbing/cyanosis/edema.   Neurologic: Moves all four extremities. Full range of motion.   Skin: Warm and moist. The patient's skin has normal elasticity and good skin turgor.   Psychiatric: Appropriate mood and affect.  Musculoskeletal: Normal range of motion, normal strength    TELEMETRY: 	  Sinus     < from: Transthoracic Echocardiogram (04.01.22 @ 07:17) >  CONCLUSIONS:  1. Normal mitral valve. Mild mitral regurgitation.  2. Normal trileaflet aortic valve.  3. Aortic Root: 3.7 cm.  4. Severely dilated left atrium.  LA volume index = 66  cc/m2.  5. Moderate concentric left ventricular hypertrophy.  6. Moderate global left ventricular systolic dysfunction.  7. Grade I diastolic dysfunction (Impaired relaxation,  mild).  8. Normal right atrium.  9. Normal right ventricular size and systolic function  (TAPSE  1.9cm).  10. Unable to estimate RVSP.  11. There is trace tricuspid regurgitation.  12. There is trace pulmonic regurgitation.  13. Small pericardial effusion.  < end of copied text >      ASSESSMENT/PLAN: 	32y  Male  PMH of HTN, HLD, Varicose veins presents to the ED for SOB and cough positive trop elevated BNP found with acute systolic heart failure.    - PT with Elevated Ddimer on admit, VQ scan (-) and lower extremity dopplers (-)   - Echo with moderate LV systolic dysfunction   - s/p IV diuresis  - cont Coreg  - cath cancelled due to elevated BP and possible IV site infection   - BP has improved after hydralazine  -ID eval appreciated, Blood cx negative  -Renal eval noted, plan for renal bx this week when stable  -Renal bx cannot be done on APT, so therefore will hold off on cardiac cath until after procedure  -cath when medically optimized   -Heme eval appreciated

## 2022-04-11 NOTE — PROGRESS NOTE ADULT - SUBJECTIVE AND OBJECTIVE BOX
Patient is a 32y old  Male who presents with a chief complaint of SOB (10 Apr 2022 08:47)      MEDICATIONS  (STANDING):  atorvastatin 40 milliGRAM(s) Oral at bedtime  carvedilol 6.25 milliGRAM(s) Oral every 12 hours  chlorhexidine 2% Cloths 1 Application(s) Topical daily  dextrose 5%. 1000 milliLiter(s) (100 mL/Hr) IV Continuous <Continuous>  dextrose 5%. 1000 milliLiter(s) (50 mL/Hr) IV Continuous <Continuous>  dextrose 50% Injectable 25 Gram(s) IV Push once  dextrose 50% Injectable 12.5 Gram(s) IV Push once  dextrose 50% Injectable 25 Gram(s) IV Push once  glucagon  Injectable 1 milliGRAM(s) IntraMuscular once  heparin   Injectable 5000 Unit(s) SubCutaneous every 12 hours  hydrALAZINE 25 milliGRAM(s) Oral three times a day  insulin glargine Injectable (LANTUS) 40 Unit(s) SubCutaneous at bedtime  insulin lispro (ADMELOG) corrective regimen sliding scale   SubCutaneous three times a day before meals  insulin lispro (ADMELOG) corrective regimen sliding scale   SubCutaneous at bedtime  insulin lispro Injectable (ADMELOG) 15 Unit(s) SubCutaneous three times a day before meals  sodium chloride 0.9% lock flush 3 milliLiter(s) IV Push every 8 hours    MEDICATIONS  (PRN):  dextrose Oral Gel 15 Gram(s) Oral once PRN Blood Glucose LESS THAN 70 milliGRAM(s)/deciliter      ROS  No fever, sweats, chills  No epistaxis, HA, sore throat  No CP, SOB, cough, sputum  No n/v/d, abd pain, melena, hematochezia  No edema  No rash  No anxiety  No back pain, joint pain  No bleeding, bruising  No dysuria, hematuria    Vital Signs Last 24 Hrs  T(C): 37.1 (11 Apr 2022 05:35), Max: 37.1 (11 Apr 2022 05:35)  T(F): 98.7 (11 Apr 2022 05:35), Max: 98.7 (11 Apr 2022 05:35)  HR: 84 (11 Apr 2022 05:35) (84 - 93)  BP: 149/91 (11 Apr 2022 05:35) (140/86 - 149/91)  BP(mean): --  RR: 18 (11 Apr 2022 05:35) (18 - 18)  SpO2: 100% (11 Apr 2022 05:35) (99% - 100%)    PE  NAD  Awake, alert  Anicteric, MMM  No c/c/e  No rash grossly                            8.6    6.43  )-----------( 313      ( 11 Apr 2022 08:20 )             26.3       04-10    136  |  105  |  35<H>  ----------------------------<  73  4.9   |  22  |  1.81<H>    Ca    8.5      10 Apr 2022 07:10  Phos  4.4     04-10  Mg     2.40     04-10    TPro  5.6<L>  /  Alb  x   /  TBili  x   /  DBili  x   /  AST  x   /  ALT  x   /  AlkPhos  x   04-10

## 2022-04-11 NOTE — CHART NOTE - NSCHARTNOTEFT_GEN_A_CORE
Patient Age: 32  Patient Gender: male   Procedure (including site / side if known): renal biopsy   Diagnosis / Indication: SUSANA and UA with microscopic hematuria and proteinuria  Interventional Radiology Attending Physician: dr rowan   Ordering Attending Physician: dr ny   Pertinent medical history: autism (signs own consent) htn dm lymphedema chf   Patient and Family aware? Yes

## 2022-04-12 LAB
ANION GAP SERPL CALC-SCNC: 8 MMOL/L — SIGNIFICANT CHANGE UP (ref 7–14)
ANION GAP SERPL CALC-SCNC: 8 MMOL/L — SIGNIFICANT CHANGE UP (ref 7–14)
APTT BLD: 33.6 SEC — SIGNIFICANT CHANGE UP (ref 27–36.3)
BUN SERPL-MCNC: 32 MG/DL — HIGH (ref 7–23)
BUN SERPL-MCNC: 35 MG/DL — HIGH (ref 7–23)
CALCIUM SERPL-MCNC: 8.6 MG/DL — SIGNIFICANT CHANGE UP (ref 8.4–10.5)
CALCIUM SERPL-MCNC: 8.8 MG/DL — SIGNIFICANT CHANGE UP (ref 8.4–10.5)
CHLORIDE SERPL-SCNC: 104 MMOL/L — SIGNIFICANT CHANGE UP (ref 98–107)
CHLORIDE SERPL-SCNC: 105 MMOL/L — SIGNIFICANT CHANGE UP (ref 98–107)
CO2 SERPL-SCNC: 22 MMOL/L — SIGNIFICANT CHANGE UP (ref 22–31)
CO2 SERPL-SCNC: 23 MMOL/L — SIGNIFICANT CHANGE UP (ref 22–31)
CREAT SERPL-MCNC: 1.72 MG/DL — HIGH (ref 0.5–1.3)
CREAT SERPL-MCNC: 1.85 MG/DL — HIGH (ref 0.5–1.3)
EGFR: 49 ML/MIN/1.73M2 — LOW
EGFR: 53 ML/MIN/1.73M2 — LOW
FOLATE SERPL-MCNC: 7.5 NG/ML — SIGNIFICANT CHANGE UP (ref 3.1–17.5)
GLUCOSE BLDC GLUCOMTR-MCNC: 109 MG/DL — HIGH (ref 70–99)
GLUCOSE BLDC GLUCOMTR-MCNC: 144 MG/DL — HIGH (ref 70–99)
GLUCOSE BLDC GLUCOMTR-MCNC: 76 MG/DL — SIGNIFICANT CHANGE UP (ref 70–99)
GLUCOSE BLDC GLUCOMTR-MCNC: 78 MG/DL — SIGNIFICANT CHANGE UP (ref 70–99)
GLUCOSE BLDC GLUCOMTR-MCNC: 80 MG/DL — SIGNIFICANT CHANGE UP (ref 70–99)
GLUCOSE SERPL-MCNC: 62 MG/DL — LOW (ref 70–99)
GLUCOSE SERPL-MCNC: 69 MG/DL — LOW (ref 70–99)
HAPTOGLOB SERPL-MCNC: 90 MG/DL — SIGNIFICANT CHANGE UP (ref 34–200)
HCT VFR BLD CALC: 24.6 % — LOW (ref 39–50)
HGB BLD-MCNC: 8 G/DL — LOW (ref 13–17)
IGA FLD-MCNC: 244 MG/DL — SIGNIFICANT CHANGE UP (ref 84–499)
IGG FLD-MCNC: 924 MG/DL — SIGNIFICANT CHANGE UP (ref 610–1660)
IGM SERPL-MCNC: 67 MG/DL — SIGNIFICANT CHANGE UP (ref 35–242)
INR BLD: 0.93 RATIO — SIGNIFICANT CHANGE UP (ref 0.88–1.16)
INTERPRETATION SERPL IFE-IMP: SIGNIFICANT CHANGE UP
IRON SATN MFR SERPL: 44 % — SIGNIFICANT CHANGE UP (ref 14–50)
IRON SATN MFR SERPL: 95 UG/DL — SIGNIFICANT CHANGE UP (ref 45–165)
KAPPA LC SER QL IFE: 8.31 MG/DL — HIGH (ref 0.33–1.94)
KAPPA/LAMBDA FREE LIGHT CHAIN RATIO, SERUM: 1.93 RATIO — HIGH (ref 0.26–1.65)
LAMBDA LC SER QL IFE: 4.31 MG/DL — HIGH (ref 0.57–2.63)
LDH SERPL L TO P-CCNC: 186 U/L — SIGNIFICANT CHANGE UP (ref 135–225)
MAGNESIUM SERPL-MCNC: 2.4 MG/DL — SIGNIFICANT CHANGE UP (ref 1.6–2.6)
MCHC RBC-ENTMCNC: 31.4 PG — SIGNIFICANT CHANGE UP (ref 27–34)
MCHC RBC-ENTMCNC: 32.5 GM/DL — SIGNIFICANT CHANGE UP (ref 32–36)
MCV RBC AUTO: 96.5 FL — SIGNIFICANT CHANGE UP (ref 80–100)
NRBC # BLD: 0 /100 WBCS — SIGNIFICANT CHANGE UP
NRBC # FLD: 0 K/UL — SIGNIFICANT CHANGE UP
PHOSPHATE SERPL-MCNC: 5 MG/DL — HIGH (ref 2.5–4.5)
PHOSPHOLIPASE A2 RECEPTOR ELISA: <1.8 RU/ML — SIGNIFICANT CHANGE UP (ref 0–19.9)
PLATELET # BLD AUTO: 328 K/UL — SIGNIFICANT CHANGE UP (ref 150–400)
POTASSIUM SERPL-MCNC: 5.4 MMOL/L — HIGH (ref 3.5–5.3)
POTASSIUM SERPL-MCNC: 5.8 MMOL/L — HIGH (ref 3.5–5.3)
POTASSIUM SERPL-SCNC: 5.4 MMOL/L — HIGH (ref 3.5–5.3)
POTASSIUM SERPL-SCNC: 5.8 MMOL/L — HIGH (ref 3.5–5.3)
PROT SERPL-MCNC: 5.9 G/DL — LOW (ref 6–8.3)
PROTHROM AB SERPL-ACNC: 10.8 SEC — SIGNIFICANT CHANGE UP (ref 10.5–13.4)
RBC # BLD: 2.55 M/UL — LOW (ref 4.2–5.8)
RBC # FLD: 12.4 % — SIGNIFICANT CHANGE UP (ref 10.3–14.5)
SODIUM SERPL-SCNC: 134 MMOL/L — LOW (ref 135–145)
SODIUM SERPL-SCNC: 136 MMOL/L — SIGNIFICANT CHANGE UP (ref 135–145)
TIBC SERPL-MCNC: 218 UG/DL — LOW (ref 220–430)
UIBC SERPL-MCNC: 123 UG/DL — SIGNIFICANT CHANGE UP (ref 110–370)
VIT B12 SERPL-MCNC: 608 PG/ML — SIGNIFICANT CHANGE UP (ref 200–900)
WBC # BLD: 6.53 K/UL — SIGNIFICANT CHANGE UP (ref 3.8–10.5)
WBC # FLD AUTO: 6.53 K/UL — SIGNIFICANT CHANGE UP (ref 3.8–10.5)

## 2022-04-12 PROCEDURE — 84165 PROTEIN E-PHORESIS SERUM: CPT | Mod: 26

## 2022-04-12 PROCEDURE — 86334 IMMUNOFIX E-PHORESIS SERUM: CPT | Mod: 26

## 2022-04-12 RX ORDER — SODIUM ZIRCONIUM CYCLOSILICATE 10 G/10G
10 POWDER, FOR SUSPENSION ORAL EVERY 8 HOURS
Refills: 0 | Status: DISCONTINUED | OUTPATIENT
Start: 2022-04-12 | End: 2022-04-12

## 2022-04-12 RX ORDER — ACETYLCYSTEINE 200 MG/ML
1200 VIAL (ML) MISCELLANEOUS EVERY 12 HOURS
Refills: 0 | Status: DISCONTINUED | OUTPATIENT
Start: 2022-04-12 | End: 2022-04-12

## 2022-04-12 RX ORDER — SODIUM ZIRCONIUM CYCLOSILICATE 10 G/10G
10 POWDER, FOR SUSPENSION ORAL THREE TIMES A DAY
Refills: 0 | Status: COMPLETED | OUTPATIENT
Start: 2022-04-12 | End: 2022-04-13

## 2022-04-12 RX ORDER — DEXTROSE 50 % IN WATER 50 %
15 SYRINGE (ML) INTRAVENOUS ONCE
Refills: 0 | Status: COMPLETED | OUTPATIENT
Start: 2022-04-12 | End: 2022-04-12

## 2022-04-12 RX ORDER — HYDRALAZINE HCL 50 MG
100 TABLET ORAL THREE TIMES A DAY
Refills: 0 | Status: DISCONTINUED | OUTPATIENT
Start: 2022-04-12 | End: 2022-04-15

## 2022-04-12 RX ADMIN — Medication 15 UNIT(S): at 17:54

## 2022-04-12 RX ADMIN — SODIUM CHLORIDE 3 MILLILITER(S): 9 INJECTION INTRAMUSCULAR; INTRAVENOUS; SUBCUTANEOUS at 05:45

## 2022-04-12 RX ADMIN — SODIUM ZIRCONIUM CYCLOSILICATE 10 GRAM(S): 10 POWDER, FOR SUSPENSION ORAL at 12:26

## 2022-04-12 RX ADMIN — ATORVASTATIN CALCIUM 40 MILLIGRAM(S): 80 TABLET, FILM COATED ORAL at 21:32

## 2022-04-12 RX ADMIN — CARVEDILOL PHOSPHATE 6.25 MILLIGRAM(S): 80 CAPSULE, EXTENDED RELEASE ORAL at 17:55

## 2022-04-12 RX ADMIN — SODIUM CHLORIDE 3 MILLILITER(S): 9 INJECTION INTRAMUSCULAR; INTRAVENOUS; SUBCUTANEOUS at 14:42

## 2022-04-12 RX ADMIN — Medication 100 MILLIGRAM(S): at 21:34

## 2022-04-12 RX ADMIN — SODIUM ZIRCONIUM CYCLOSILICATE 10 GRAM(S): 10 POWDER, FOR SUSPENSION ORAL at 06:43

## 2022-04-12 RX ADMIN — SODIUM ZIRCONIUM CYCLOSILICATE 10 GRAM(S): 10 POWDER, FOR SUSPENSION ORAL at 21:37

## 2022-04-12 RX ADMIN — INSULIN GLARGINE 40 UNIT(S): 100 INJECTION, SOLUTION SUBCUTANEOUS at 21:41

## 2022-04-12 RX ADMIN — Medication 50 MILLIGRAM(S): at 13:57

## 2022-04-12 RX ADMIN — CHLORHEXIDINE GLUCONATE 1 APPLICATION(S): 213 SOLUTION TOPICAL at 11:58

## 2022-04-12 RX ADMIN — CARVEDILOL PHOSPHATE 6.25 MILLIGRAM(S): 80 CAPSULE, EXTENDED RELEASE ORAL at 05:21

## 2022-04-12 RX ADMIN — SODIUM CHLORIDE 3 MILLILITER(S): 9 INJECTION INTRAMUSCULAR; INTRAVENOUS; SUBCUTANEOUS at 21:31

## 2022-04-12 RX ADMIN — Medication 50 MILLIGRAM(S): at 05:21

## 2022-04-12 RX ADMIN — Medication 15 GRAM(S): at 11:54

## 2022-04-12 NOTE — PROVIDER CONTACT NOTE (OTHER) - BACKGROUND
Final Anesthesia Post-op Assessment    Patient: Mohini Rubio  Procedure(s) Performed: ANKLE ARTHROTOMYPERONEAL TENDON [id number] TMT ARTHRODESISLATERAL LIGAMENT REPAIR  Anesthesia type: General    Vital Last Value   Temperature 36.4 °C (97.5 °F) (09/27/17 1340)   Pulse 72 (09/27/17 1425)   Respiratory Rate 19 (09/27/17 1425)   Non-Invasive   Blood Pressure 111/72 (09/27/17 1425)   Arterial  Blood Pressure     Pulse Oximetry 93 % (09/27/17 1425)     Last 24 I/O:   Intake/Output Summary (Last 24 hours) at 09/27/17 1515  Last data filed at 09/27/17 1459   Gross per 24 hour   Intake             2000 ml   Output                0 ml   Net             2000 ml         Anesthesia Post-op Note      Patient: Mohini Rubio      Vital Last Value   Temperature 36.4 °C (97.5 °F) (09/27/17 1340)   Pulse 72 (09/27/17 1425)   Respiratory 19 (09/27/17 1425)   Non-Invasive  Blood Pressure 111/72 (09/27/17 1425)   Arterial   Blood Pressure     Pulse Oximetry 93 % (09/27/17 1425)     Mental status recovered: patient participates in evaluation.  VS noted and are stable.  Respiratory function satisfactory; airway patent.  Cardiovascular function and hydration status satisfactory.  Adequate pain control.  Nausea and vomiting control satisfactory.  No apparent anesthetic complications.     Pt admitted for chest pain

## 2022-04-12 NOTE — PROGRESS NOTE ADULT - SUBJECTIVE AND OBJECTIVE BOX
Patient is a 32y old  Male who presents with a chief complaint of SOB (2022 13:06)    PATIENT IS SEEN AND EXAMINED IN MEDICAL FLOOR.  NGT [    ]    SMALL [   ]      GT [   ]    ALLERGIES:  Keflex (Hives)      Daily     Daily Weight in k.2 (2022 05:20)    VITALS:    Vital Signs Last 24 Hrs  T(C): 36.9 (2022 05:20), Max: 36.9 (2022 11:14)  T(F): 98.5 (2022 05:20), Max: 98.5 (2022 22:05)  HR: 83 (2022 05:20) (83 - 96)  BP: 143/83 (2022 05:20) (133/78 - 152/84)  BP(mean): --  RR: 18 (2022 05:20) (17 - 18)  SpO2: 100% (2022 05:20) (100% - 100%)    LABS:    CBC Full  -  ( 2022 06:39 )  WBC Count : 6.53 K/uL  RBC Count : 2.55 M/uL  Hemoglobin : 8.0 g/dL  Hematocrit : 24.6 %  Platelet Count - Automated : 328 K/uL  Mean Cell Volume : 96.5 fL  Mean Cell Hemoglobin : 31.4 pg  Mean Cell Hemoglobin Concentration : 32.5 gm/dL  Auto Neutrophil # : x  Auto Lymphocyte # : x  Auto Monocyte # : x  Auto Eosinophil # : x  Auto Basophil # : x  Auto Neutrophil % : x  Auto Lymphocyte % : x  Auto Monocyte % : x  Auto Eosinophil % : x  Auto Basophil % : x    PT/INR - ( 2022 06:39 )   PT: 10.8 sec;   INR: 0.93 ratio         PTT - ( 2022 06:39 )  PTT:33.6 sec  04-12    136  |  105  |  35<H>  ----------------------------<  62<L>  5.4<H>   |  23  |  1.85<H>    Ca    8.6      2022 06:39  Phos  5.0     04-12  Mg     2.40     04-12    TPro  5.9<L>  /  Alb  x   /  TBili  x   /  DBili  x   /  AST  x   /  ALT  x   /  AlkPhos  x       CAPILLARY BLOOD GLUCOSE      POCT Blood Glucose.: 78 mg/dL (2022 08:43)  POCT Blood Glucose.: 127 mg/dL (2022 21:15)  POCT Blood Glucose.: 85 mg/dL (2022 17:47)  POCT Blood Glucose.: 172 mg/dL (2022 12:38)        LIVER FUNCTIONS - ( 2022 06:39 )  Alb: x     / Pro: 5.9 g/dL / ALK PHOS: x     / ALT: x     / AST: x     / GGT: x           Creatinine Trend: 1.85<--, 1.73<--, 1.79<--, 1.81<--, 2.05<--, 1.85<--  I&O's Summary    2022 07:01  -  2022 07:00  --------------------------------------------------------  IN: 520 mL / OUT: 1950 mL / NET: -1430 mL            .Blood Blood-Venous   @ 19:45   No Growth Final  --  --      .Blood Blood-Peripheral   @ 19:30   No Growth Final  --  --          MEDICATIONS:    MEDICATIONS  (STANDING):  atorvastatin 40 milliGRAM(s) Oral at bedtime  carvedilol 6.25 milliGRAM(s) Oral every 12 hours  chlorhexidine 2% Cloths 1 Application(s) Topical daily  dextrose 5%. 1000 milliLiter(s) (100 mL/Hr) IV Continuous <Continuous>  dextrose 5%. 1000 milliLiter(s) (50 mL/Hr) IV Continuous <Continuous>  dextrose 50% Injectable 25 Gram(s) IV Push once  dextrose 50% Injectable 12.5 Gram(s) IV Push once  dextrose 50% Injectable 25 Gram(s) IV Push once  glucagon  Injectable 1 milliGRAM(s) IntraMuscular once  heparin   Injectable 5000 Unit(s) SubCutaneous every 12 hours  hydrALAZINE 50 milliGRAM(s) Oral three times a day  insulin glargine Injectable (LANTUS) 40 Unit(s) SubCutaneous at bedtime  insulin lispro (ADMELOG) corrective regimen sliding scale   SubCutaneous three times a day before meals  insulin lispro (ADMELOG) corrective regimen sliding scale   SubCutaneous at bedtime  insulin lispro Injectable (ADMELOG) 15 Unit(s) SubCutaneous three times a day before meals  sodium chloride 0.9% lock flush 3 milliLiter(s) IV Push every 8 hours      MEDICATIONS  (PRN):  dextrose Oral Gel 15 Gram(s) Oral once PRN Blood Glucose LESS THAN 70 milliGRAM(s)/deciliter      REVIEW OF SYSTEMS:                           ALL ROS DONE [ X   ]    CONSTITUTIONAL:  LETHARGIC [   ], FEVER [   ], UNRESPONSIVE [   ]  CVS:  CP  [   ], SOB, [   ], PALPITATIONS [   ], DIZZYNESS [   ]  RS: COUGH [   ], SPUTUM [   ]  GI: ABDOMINAL PAIN [   ], NAUSEA [   ], VOMITINGS [   ], DIARRHEA [   ], CONSTIPATION [   ]  :  DYSURIA [   ], NOCTURIA [   ], INCREASED FREQUENCY [   ], DRIBLING [   ],  SKELETAL: PAINFUL JOINTS [   ], SWOLLEN JOINTS [   ], NECK ACHE [   ], LOW BACK ACHE [   ],  SKIN : ULCERS [   ], RASH [   ], ITCHING [   ]  CNS: HEAD ACHE [   ], DOUBLE VISION [   ], BLURRED VISION [   ], AMS / CONFUSION [   ], SEIZURES [   ], WEAKNESS [   ],TINGLING / NUMBNESS [   ]    PHYSICAL EXAMINATION:  GENERAL APPEARANCE: NO DISTRESS  HEENT:  NO PALLOR, NO  JVD,  NO   NODES, NECK SUPPLE  CVS: S1 +, S2 +,   RS: AEEB,  OCCASIONAL  RALES +,   NO RONCHI  ABD: SOFT, NT, NO, BS +  EXT: PE ++   LEFT FOREARM WOUND IMPROVED  SKIN: WARM,   SKELETAL:  ROM ACCEPTABLE  CNS:  AAO X 3    RADIOLOGY :    ACC: [id number] EXAM:  US DPLX LWR EXT VEINS COMPL BI                          PROCEDURE DATE:  2022          INTERPRETATION:  CLINICAL INFORMATION: Shortness of breath. Diabetes.    COMPARISON: None available.    TECHNIQUE: Duplex sonography of the BILATERAL LOWER extremity veins with   color and spectral Doppler, with and without compression. The examination   is technically limited secondary to subcutaneous edema in the calves and   body habitus.    FINDINGS:    RIGHT:  Normal compressibility of the RIGHT common femoral, femoral and popliteal   veins.  Doppler examination shows normal spontaneous and phasic flow.  Calf veins not seen.    Subcutaneous edema in the calf.    LEFT:  Normal compressibility of the LEFT common femoral, femoral and popliteal   veins.  Doppler examination shows normal spontaneous and phasic flow.  Calf veins not seen.    Subcutaneous edema in the calf.    IMPRESSION:    Technically limited study including nonvisualization of the calf veins   with no evidence of deep venous thrombosis in the visualized bilateral   lower extremity veins.    =========================    ACC: [id number] EXAM:  NM PULM VENTILATION PERFUS IMG                          PROCEDURE DATE:  2022          INTERPRETATION:  CLINICAL INFORMATION: 32 year old male with dyspnea,   elevated serum creatinine level; referred to evaluate for pulmonary   embolism.    RADIOPHARMACEUTICAL: 1.0 mCi Tc-99m-DTPA via inhalation; 6.2 mCi   Tc-99m-MAA, I.V.    TECHNIQUE:  Ventilation and perfusion images of the lungs were obtained   following administration of Tc-99m-DTPA and Tc-99m-MAA. Images were   obtained in the anterior, posterior, both lateral, and all 4 oblique   projections. The study was interpreted in conjunction with a chest   radiograph of 3/31/2022.    COMPARISON: No prior lung V/Q scan.    FINDINGS: There is heterogeneous distribution of radiopharmaceutical in   the lungs on the ventilation and perfusion images. There are no segmental   perfusion defects.    IMPRESSION: Very low probability of pulmonary embolus.      ASSESSMENT :       PLAN:  HPI:  32 year old male with Autism (without limitations of functionality and intellect; able to work and sign his own consent per mother who is at bedside)HTN, diabetes mellitus type 2, lymphedema since age 12 that was recently diagnosed who presented to [facility] ED 3/31/22 complaining of SOB and cough x1week. Patient states that he has not taken his Lantus 35units suc bedtime/Lispro 15unites with meals or Lisinopril 40mg po daily as he ran out of refills and has been waiting for an appointment.  He has noted increased abdominal weight with tighter pants along with SOB walking (can not quantify).  Patient states he was about to take off on a flight to [place] when he started having SOB and a cough with productive of clear sputum while on the airplane [place]  Flight crew activated EMS and patient was BIBA to [facility]. Patient R/O MI with negative troponins. Labs were remarkable for Cr 1.83 and BNP was 2900 for which he was started on IV Lasix diuresis for acute systolic and diastolic CHF and Ddimer 335 with unremarkable LE dopplers and V/Q scan with very low probability of PE. Denies chest pain, fever, chills, abd pain, N/V/D.   Renal consulted for elevated Cr, likely due to DM2. Started on Mucomyst 1200mg po bid x4 doses in anticipation for cath.  Underwent an transthoracic Echo revealing EF 40-45%, mod global LV systolic dysfunction, mod concentric LV hypertrophy, sev dil LA, no sig valvular disease.   In light of patients cardiac risk factors, symptoms and abnormal noninvasive test findings the patient is now transferred to [facility] 22 for a cardiac catheterization to evaluate for ischemic etiology of CHF.   MEDS at [facility]: Coreg 3.125mg po BID, ASA 81mg po daily, Lipitor 40mg po daily, lisinopril 40mg po daily, Lantus 40units subcutaneous bedtime, lasix 40mg IV BID, Lispro 15units subc TID meals    Denies fever, cough, chills, headache, flu like symptoms, sick contact or recent travel  COVID PCR not detected on 4/3/22    UA with protein and small blood. Check spot Upr/Cr3  Pt will eventually benefit from ACEi/ARB due to proteinuria and HF, once renal function is stable (post cath).    (2022 14:13)    # CASE D/W MOTHER, ALBERTO SIMMONS @ BEDSIDE [] - ALL QUESTIONS ANSWERED    # HYPERKALEMIA - PLACED ON LOKELMA, F/U REPEAT POTASSIUM    # ? LEFT FOREARM PHLEBITIS [IMPROVING]  - BCX [NGTD], MONITOR SITE  - WARM COMPRESSES, ARM ELEVATION  - ID CONSULT IN PROGRESS    # SUSPECT NEW ONSET CHF - SYSTOLIC AND DIASTOLIC DYSFUNCTION  # HYPERTENSIVE URGENCY, HTN  # B/L LE EDEMA - SUSPECT S/T CHF AND ?VARICOSE VEINS  - MONITOR ON TELEMETRY, TRENDED TROPONINS  - STATIN, COREG; HOLDING DIURETICS  - HOLD ASA  - REVIEWED TSH/LIPID PANEL/HBA1C  - CARDIOLOGY CONSULT    - ECHO - MILD MR, DILATED LA, CONCENTRIC LVH, GLOBAL LV SYSTOLIC DYSFUNCTION, G1DD  - TRANSFERRED TO [facility] FOR CARDIAC CATH    # ELEVATED D-DIMER  - V/Q SCAN - LOW PROBABILITY PE  - DOPPLER LE - NEGATIVE FOR DVT    # POSITIVE SERUM IMMUNOFIXATION  - HEME/ONC CONSULT    # SUSANA VS. CKD - REVIEWED UA, MONITOR CR, AVOID NEPHROTOXIC AGENTS  - REVIEWED RENAL U/S  - NEPHROLOGY CONSULT IN PROGRESS    - F/U RENAL BX [DELAYED TO , GIVEN HYPERKALEMIA]    - CR IMPROVING SINCE DIURETICS HELD    # SUSPECT CAMERON  - OUTPATIENT SLEEP STUDY    # MORBID OBESITY   - NUTRITION CONSULT    # DM2, NONCOMPLIANT W/ INSULIN - HBA1C 9.4 - LANTUS, SSI + FS    - HYPOGLYCEMIA, REDUCING DOSE OF INSULIN, MONITORING FINGERSTICKS    # HLD - STARTED STATIN, REVIEWED LIPID PANEL    # GI AND DVT PPX    Patient is a 32y old  Male who presents with a chief complaint of SOB (2022 13:06)    PATIENT IS SEEN AND EXAMINED IN MEDICAL FLOOR.    ALLERGIES:  Keflex (Hives)      Daily     Daily Weight in k.2 (2022 05:20)    VITALS:    Vital Signs Last 24 Hrs  T(C): 36.9 (2022 05:20), Max: 36.9 (2022 11:14)  T(F): 98.5 (2022 05:20), Max: 98.5 (2022 22:05)  HR: 83 (2022 05:20) (83 - 96)  BP: 143/83 (2022 05:20) (133/78 - 152/84)  BP(mean): --  RR: 18 (2022 05:20) (17 - 18)  SpO2: 100% (2022 05:20) (100% - 100%)    LABS:    CBC Full  -  ( 2022 06:39 )  WBC Count : 6.53 K/uL  RBC Count : 2.55 M/uL  Hemoglobin : 8.0 g/dL  Hematocrit : 24.6 %  Platelet Count - Automated : 328 K/uL  Mean Cell Volume : 96.5 fL  Mean Cell Hemoglobin : 31.4 pg  Mean Cell Hemoglobin Concentration : 32.5 gm/dL  Auto Neutrophil # : x  Auto Lymphocyte # : x  Auto Monocyte # : x  Auto Eosinophil # : x  Auto Basophil # : x  Auto Neutrophil % : x  Auto Lymphocyte % : x  Auto Monocyte % : x  Auto Eosinophil % : x  Auto Basophil % : x    PT/INR - ( 2022 06:39 )   PT: 10.8 sec;   INR: 0.93 ratio         PTT - ( 2022 06:39 )  PTT:33.6 sec  -12    136  |  105  |  35<H>  ----------------------------<  62<L>  5.4<H>   |  23  |  1.85<H>    Ca    8.6      2022 06:39  Phos  5.0     04-12  Mg     2.40     04-12    TPro  5.9<L>  /  Alb  x   /  TBili  x   /  DBili  x   /  AST  x   /  ALT  x   /  AlkPhos  x   04-12    CAPILLARY BLOOD GLUCOSE      POCT Blood Glucose.: 78 mg/dL (2022 08:43)  POCT Blood Glucose.: 127 mg/dL (2022 21:15)  POCT Blood Glucose.: 85 mg/dL (2022 17:47)  POCT Blood Glucose.: 172 mg/dL (2022 12:38)        LIVER FUNCTIONS - ( 2022 06:39 )  Alb: x     / Pro: 5.9 g/dL / ALK PHOS: x     / ALT: x     / AST: x     / GGT: x           Creatinine Trend: 1.85<--, 1.73<--, 1.79<--, 1.81<--, 2.05<--, 1.85<--  I&O's Summary    2022 07:01  -  2022 07:00  --------------------------------------------------------  IN: 520 mL / OUT: 1950 mL / NET: -1430 mL            .Blood Blood-Venous   @ 19:45   No Growth Final  --  --      .Blood Blood-Peripheral   @ 19:30   No Growth Final  --  --          MEDICATIONS:    MEDICATIONS  (STANDING):  atorvastatin 40 milliGRAM(s) Oral at bedtime  carvedilol 6.25 milliGRAM(s) Oral every 12 hours  chlorhexidine 2% Cloths 1 Application(s) Topical daily  dextrose 5%. 1000 milliLiter(s) (100 mL/Hr) IV Continuous <Continuous>  dextrose 5%. 1000 milliLiter(s) (50 mL/Hr) IV Continuous <Continuous>  dextrose 50% Injectable 25 Gram(s) IV Push once  dextrose 50% Injectable 12.5 Gram(s) IV Push once  dextrose 50% Injectable 25 Gram(s) IV Push once  glucagon  Injectable 1 milliGRAM(s) IntraMuscular once  heparin   Injectable 5000 Unit(s) SubCutaneous every 12 hours  hydrALAZINE 50 milliGRAM(s) Oral three times a day  insulin glargine Injectable (LANTUS) 40 Unit(s) SubCutaneous at bedtime  insulin lispro (ADMELOG) corrective regimen sliding scale   SubCutaneous three times a day before meals  insulin lispro (ADMELOG) corrective regimen sliding scale   SubCutaneous at bedtime  insulin lispro Injectable (ADMELOG) 15 Unit(s) SubCutaneous three times a day before meals  sodium chloride 0.9% lock flush 3 milliLiter(s) IV Push every 8 hours      MEDICATIONS  (PRN):  dextrose Oral Gel 15 Gram(s) Oral once PRN Blood Glucose LESS THAN 70 milliGRAM(s)/deciliter      REVIEW OF SYSTEMS:                           ALL ROS DONE [ X   ]    CONSTITUTIONAL:  LETHARGIC [   ], FEVER [   ], UNRESPONSIVE [   ]  CVS:  CP  [   ], SOB, [   ], PALPITATIONS [   ], DIZZYNESS [   ]  RS: COUGH [   ], SPUTUM [   ]  GI: ABDOMINAL PAIN [   ], NAUSEA [   ], VOMITINGS [   ], DIARRHEA [   ], CONSTIPATION [   ]  :  DYSURIA [   ], NOCTURIA [   ], INCREASED FREQUENCY [   ], DRIBLING [   ],  SKELETAL: PAINFUL JOINTS [   ], SWOLLEN JOINTS [   ], NECK ACHE [   ], LOW BACK ACHE [   ],  SKIN : ULCERS [   ], RASH [   ], ITCHING [   ]  CNS: HEAD ACHE [   ], DOUBLE VISION [   ], BLURRED VISION [   ], AMS / CONFUSION [   ], SEIZURES [   ], WEAKNESS [   ],TINGLING / NUMBNESS [   ]    PHYSICAL EXAMINATION:  GENERAL APPEARANCE: NO DISTRESS  HEENT:  NO PALLOR, NO  JVD,  NO   NODES, NECK SUPPLE  CVS: S1 +, S2 +,   RS: AEEB,  OCCASIONAL  RALES +,   NO RONCHI  ABD: SOFT, NT, NO, BS +  EXT: PE ++   LEFT FOREARM WOUND IMPROVED  SKIN: WARM,   SKELETAL:  ROM ACCEPTABLE  CNS:  AAO X 3    RADIOLOGY :    ACC: [id number] EXAM:  US DPLX LWR EXT VEINS COMPL BI                          PROCEDURE DATE:  2022          INTERPRETATION:  CLINICAL INFORMATION: Shortness of breath. Diabetes.    COMPARISON: None available.    TECHNIQUE: Duplex sonography of the BILATERAL LOWER extremity veins with   color and spectral Doppler, with and without compression. The examination   is technically limited secondary to subcutaneous edema in the calves and   body habitus.    FINDINGS:    RIGHT:  Normal compressibility of the RIGHT common femoral, femoral and popliteal   veins.  Doppler examination shows normal spontaneous and phasic flow.  Calf veins not seen.    Subcutaneous edema in the calf.    LEFT:  Normal compressibility of the LEFT common femoral, femoral and popliteal   veins.  Doppler examination shows normal spontaneous and phasic flow.  Calf veins not seen.    Subcutaneous edema in the calf.    IMPRESSION:    Technically limited study including nonvisualization of the calf veins   with no evidence of deep venous thrombosis in the visualized bilateral   lower extremity veins.    =========================    ACC: [id number] EXAM:  NM PULM VENTILATION PERFUS IMG                          PROCEDURE DATE:  2022          INTERPRETATION:  CLINICAL INFORMATION: 32 year old male with dyspnea,   elevated serum creatinine level; referred to evaluate for pulmonary   embolism.    RADIOPHARMACEUTICAL: 1.0 mCi Tc-99m-DTPA via inhalation; 6.2 mCi   Tc-99m-MAA, I.V.    TECHNIQUE:  Ventilation and perfusion images of the lungs were obtained   following administration of Tc-99m-DTPA and Tc-99m-MAA. Images were   obtained in the anterior, posterior, both lateral, and all 4 oblique   projections. The study was interpreted in conjunction with a chest   radiograph of 3/31/2022.    COMPARISON: No prior lung V/Q scan.    FINDINGS: There is heterogeneous distribution of radiopharmaceutical in   the lungs on the ventilation and perfusion images. There are no segmental   perfusion defects.    IMPRESSION: Very low probability of pulmonary embolus.      ASSESSMENT :       PLAN:  HPI:  32 year old male with Autism (without limitations of functionality and intellect; able to work and sign his own consent per mother who is at bedside)HTN, diabetes mellitus type 2, lymphedema since age 12 that was recently diagnosed who presented to [facility] ED 3/31/22 complaining of SOB and cough x1week. Patient states that he has not taken his Lantus 35units suc bedtime/Lispro 15unites with meals or Lisinopril 40mg po daily as he ran out of refills and has been waiting for an appointment.  He has noted increased abdominal weight with tighter pants along with SOB walking (can not quantify).  Patient states he was about to take off on a flight to [place] when he started having SOB and a cough with productive of clear sputum while on the airplane [place].  Flight crew activated EMS and patient was BIBA to [facility]. Patient R/O MI with negative troponins. Labs were remarkable for Cr 1.83 and BNP was 2900 for which he was started on IV Lasix diuresis for acute systolic and diastolic CHF and Ddimer 335 with unremarkable LE dopplers and V/Q scan with very low probability of PE. Denies chest pain, fever, chills, abd pain, N/V/D.   Renal consulted for elevated Cr, likely due to DM2. Started on Mucomyst 1200mg po bid x4 doses in anticipation for cath.  Underwent an transthoracic Echo revealing EF 40-45%, mod global LV systolic dysfunction, mod concentric LV hypertrophy, sev dil LA, no sig valvular disease.   In light of patients cardiac risk factors, symptoms and abnormal noninvasive test findings the patient is now transferred to [facility] 22 for a cardiac catheterization to evaluate for ischemic etiology of CHF.   MEDS at [facility]: Coreg 3.125mg po BID, ASA 81mg po daily, Lipitor 40mg po daily, lisinopril 40mg po daily, Lantus 40units subcutaneous bedtime, lasix 40mg IV BID, Lispro 15units subc TID meals    Denies fever, cough, chills, headache, flu like symptoms, sick contact or recent travel  COVID PCR not detected on 4/3/22    UA with protein and small blood. Check spot Upr/Cr3  Pt will eventually benefit from ACEi/ARB due to proteinuria and HF, once renal function is stable (post cath).    (2022 14:13)    # CASE D/W MOTHER, ALBERTO SIMMONS @ BEDSIDE [] - ALL QUESTIONS ANSWERED    # HYPERKALEMIA - PLACED ON LOKELMA, F/U REPEAT POTASSIUM    # ? LEFT FOREARM PHLEBITIS [IMPROVING]  - BCX [NGTD], MONITOR SITE  - WARM COMPRESSES, ARM ELEVATION  - ID CONSULT IN PROGRESS    # SUSPECT NEW ONSET CHF - SYSTOLIC AND DIASTOLIC DYSFUNCTION  # HYPERTENSIVE URGENCY, HTN  # B/L LE EDEMA - SUSPECT S/T CHF AND ?VARICOSE VEINS  - MONITOR ON TELEMETRY, TRENDED TROPONINS  - STATIN, COREG; HOLDING DIURETICS  - HOLD ASA  - REVIEWED TSH/LIPID PANEL/HBA1C  - CARDIOLOGY CONSULT    - ECHO - MILD MR, DILATED LA, CONCENTRIC LVH, GLOBAL LV SYSTOLIC DYSFUNCTION, G1DD  - TRANSFERRED TO [facility] FOR CARDIAC CATH    # ELEVATED D-DIMER  - V/Q SCAN - LOW PROBABILITY PE  - DOPPLER LE - NEGATIVE FOR DVT    # POSITIVE SERUM IMMUNOFIXATION  - HEME/ONC CONSULT    # SUSANA VS. CKD - REVIEWED UA, MONITOR CR, AVOID NEPHROTOXIC AGENTS  - REVIEWED RENAL U/S  - NEPHROLOGY CONSULT IN PROGRESS    - F/U RENAL BX [DELAYED TO , GIVEN HYPERKALEMIA] ; PER IR DR. MYERS, CASE CANCELLED DUE TO EPISODE OF HYPERTENSION      - CR IMPROVING SINCE DIURETICS HELD    # SUSPECT CAMERON  - OUTPATIENT SLEEP STUDY    # MORBID OBESITY   - NUTRITION CONSULT    # DM2, NONCOMPLIANT W/ INSULIN - HBA1C 9.4 - LANTUS, SSI + FS    - HYPOGLYCEMIA, REDUCING DOSE OF INSULIN, MONITORING FINGERSTICKS    # HLD - STARTED STATIN, REVIEWED LIPID PANEL    # GI AND DVT PPX

## 2022-04-12 NOTE — PROGRESS NOTE ADULT - ASSESSMENT
32y Male with history of HTN presents with SOB transferred for cardiac cath. Nephrology consulted for elevated Scr.    1) SUSANA: likely hemodynamically mediated in setting of diuresis and ACE-I use. Scr improved and at marie suspect baseline. UA with microscopic hematuria and subnephrotic range proteinuria. FeUrea low. Renal US negative for obstruction. Avoid nephrotoxins.    2) CKD Unspecified: Patient denies h/o CKD? but will need to confirm baseline with PMD (Dr. Luís Mast in [place]). Suspect patient with underlying diabetic nephropathy given microscopic hematuria and subnephrotic range proteinuria and large kidneys on renal US. Follow up proteinuria and GN serologies (PLA2R ab, ANCA), Patient with positive serum ZARA for which Heme following. YESSICA positive but with negative dsDNA and complements making autoimmune disease unlikely. Patient planned for IR guided kidney biopsy today. Monitor electrolytes.    3) HTN with CKD: BP improving. Continue with current medications and titrate hydralazine as needed. Holding lisinopril given SUSANA. Monitor BP.    4) LE edema: Improving. Do not suspect lymphedema will improve significantly with diuresis. Hold bumex 1 mg PO daily given SUSANA and plan for cardiac cath on Wednesday however will resume post-cath for maintenance. TTE with moderate global LVSD and diastolic dysfunction. Monitor UO.    5) Hyperkalemia: Mild (patient drank orangeade again on 4/11). Will treat with lokelma 10 grams PO TID X 3 doses. Continue with low K diet. Monitor serum K.    If patient planned for cardiac cath, patient educated that he has a 26% risk of developing WANDA and 1% risk of requiring RRT given risk factors of CHF, anemia, DM and CKD. Will start mucomyst 1200 mg PO Q12 hours X 4 doses the evening prior to cath. Defer IVF given moderate global LVSD on TTE.      [facility] NEPHROLOGY  Han Choudhury M.D.  Mekhi Verdin D.O.  Camille Brown M.D.  Lorena Wesley, MSN, ANP-C    Telephone: [phone]  Facsimile: [phone]    [address] [place] [address]

## 2022-04-12 NOTE — PROGRESS NOTE ADULT - SUBJECTIVE AND OBJECTIVE BOX
[facility] NEPHROLOGY- PROGRESS NOTE    32y Male with history of HTN presents with SOB transferred for cardiac cath. Nephrology consulted for elevated Scr.    REVIEW OF SYSTEMS:  Gen: no changes in weight  Cards: no chest pain  Resp: no dyspnea  GI: no nausea or vomiting or diarrhea  Vascular: + LE edema (chronic)    Keflex (Hives)      Hospital Medications: Medications reviewed      VITALS:  T(F): 98.5 (22 @ 05:20), Max: 98.5 (22 @ 22:05)  HR: 83 (22 @ 05:20)  BP: 143/83 (22 @ 05:20)  RR: 18 (22 @ 05:20)  SpO2: 100% (22 @ 05:20)  Wt(kg): --     @ 07:01  -   @ 07:00  --------------------------------------------------------  IN: 520 mL / OUT: 1950 mL / NET: -1430 mL        PHYSICAL EXAM:    Gen: NAD, calm  Cards: RRR, +S1/S2, no M/G/R  Resp: CTA B/L  GI: soft, NT/ND, NABS  Vascular: + LE lymphedema      LABS:      136  |  105  |  35<H>  ----------------------------<  62<L>  5.4<H>   |  23  |  1.85<H>    Ca    8.6      2022 06:39  Phos  5.0       Mg     2.40         TPro  5.9<L>  /  Alb      /  TBili      /  DBili      /  AST      /  ALT      /  AlkPhos          Creatinine Trend: 1.85 <--, 1.73 <--, 1.79 <--, 1.81 <--, 2.05 <--, 1.85 <--, 2.37 <--, 1.91 <--, 1.84 <--                        8.0    6.53  )-----------( 328      ( 2022 06:39 )             24.6     Urine Studies:  Urinalysis Basic - ( 2022 07:00 )    Color: Light Yellow / Appearance: Clear / S.010 / pH:   Gluc:  / Ketone: Negative  / Bili: Negative / Urobili: <2 mg/dL   Blood:  / Protein: 100 mg/dL / Nitrite: Negative   Leuk Esterase: Negative / RBC: 5 /HPF / WBC 1 /HPF   Sq Epi:  / Non Sq Epi: 0 /HPF / Bacteria: Negative      Creatinine, Random Urine: 94 mg/dL ( @ 07:00)  Protein/Creatinine Ratio Calculation: 1.2 Ratio ( @ 07:00)

## 2022-04-12 NOTE — CHART NOTE - NSCHARTNOTEFT_GEN_A_CORE
hyperkalemia - s/p lokelma 3 doses 4/11- 4/12 repeat potassium 5.8 will order lokelma 10mg tid x 3 doses - next bmp am 4/13 early am   hydralazine increased need bp ideally <140/90 for bx - (became elevated when pt explained procedure by IR )   above discussed with dr carl hyperkalemia - s/p lokelma 3 doses 4/11- 4/12 repeat potassium 5.8 will order lokelma 10mg tid x 3 doses - next bmp am 4/13 to be drawn no later than 6:30am   hydralazine increased need bp ideally <140/90 for bx - (became elevated when pt explained procedure by IR )   above discussed with dr carl

## 2022-04-12 NOTE — PROGRESS NOTE ADULT - ASSESSMENT
Hematology/Oncology consulted on this 32 year old male with hx of Autism, HTN, DM and Lymphedema who presented to [facility] with complaints of SOB and cough who underwent a TTE revealing CHF and transferred to [facility] 4/4 for a cardiac cath and found to have weak iga band..    Elevated SPEP  --ZARA Kappa 9.92  --ZARA Jocelyn 5.43  --Immunofixation with a weak IgA band is present. Polyclonal kappa, Lambda, IgG and IgM seen, this is likely inflammatory  --repeat SPEP/UPEP,ZARA,QUIGGS pending      Anemia  --Hgb 8.0 today  --may be due to renal disease  --If acute drop, please transfuse for a Hgb < 7.0  --Ferritin 585, Iron sat 48%  --checking repeat Iron studies    SUSANA  --Creatinine 1.85 today  --renal biopsy with IR planned for today, will have reg congo stain sent ( Spoke with Dr Verdin nephrologist)  - Consider additional imaging if monoclonal IgA persists  --renal following    CHF  --diuretics  --Management per cardiology    Patient may follow with Dr Joseph of [facility] after discharge  Thank you for allowing us to participate in Mr Garcia's care    Lela Berkowitz NP  Hematology/Oncology  [facility]  [phone] (Office)  [phone] (Alt office)  Evenings and weekends please call MD on call or office        Hematology/Oncology consulted on this 32 year old male with hx of Autism, HTN, DM and Lymphedema who presented to [facility] with complaints of SOB and cough who underwent a TTE revealing CHF and transferred to [facility] 4/4 for a cardiac cath and found to have weak iga band..    Elevated SPEP  --ZARA Kappa 9.92  --ZARA Jocelyn 5.43  --Immunofixation with a weak IgA band is present. Polyclonal kappa, Lambda, IgG and IgM seen, this is likely inflammatory  --repeat SPEP/UPEP,ZARA,QUIGGS pending    Anemia  --Hgb 8.0 today  --may be due to renal disease  --If acute drop, please transfuse for a Hgb < 7.0  --Ferritin 585, Iron sat 48%  --checking repeat Iron studies    SUSANA  --Creatinine 1.85 today  --renal biopsy with IR planned for today, will have reg congo stain sent ( Spoke with Dr Verdin nephrologist)  - Consider additional imaging if monoclonal IgA persists  --renal following    CHF  --diuretics  --Management per cardiology    Patient may follow with Dr Joseph of [facility] after discharge  Thank you for allowing us to participate in Mr Garcia's care    Lela Berkowitz NP  Hematology/Oncology  [facility]  [phone] (Office)  [phone] (Alt office)  Evenings and weekends please call MD on call or office

## 2022-04-12 NOTE — PROGRESS NOTE ADULT - SUBJECTIVE AND OBJECTIVE BOX
Patient presented to IR for parenchymal kidney biopsy. Pre-procedural blood pressure was persistently elevated, with SBP in 160-180s. Procedure was not performed at this time, as elevated BP significantly increases periprocedural risk of bleeding. Patient was transferred from the department in stable condition.     - Recommend optimizing BP prior to renal biopsy, ideally <140/90.    Case d/w Dr. Montero.

## 2022-04-12 NOTE — PROGRESS NOTE ADULT - SUBJECTIVE AND OBJECTIVE BOX
C A R D I O L O G Y  **********************************     DATE OF SERVICE: 04-12-22    S: no chest pain or sob;   Review of systems otherwise (-)      Review of Systems:   Constitutional: [ ] fevers, [ ] chills.   Skin: [ ] dry skin. [ ] rashes.  Psychiatric: [ ] depression, [ ] anxiety.   Gastrointestinal: [ ] BRBPR, [ ] melena.   Neurological: [ ] confusion. [ ] seizures. [ ] shuffling gait.   Ears,Nose,Mouth and Throat: [ ] ear pain [ ] sore throat.   Eyes: [ ] diplopia.   Respiratory: [ ] hemoptysis. [ ] shortness of breath  Cardiovascular: See HPI above  Hematologic/Lymphatic: [ ] anemia. [ ] painful nodes. [ ] prolonged bleeding.   Genitourinary: [ ] hematuria. [ ] flank pain.   Endocrine: [ ] significant change in weight. [ ] intolerance to heat and cold.     Review of systems [ x] otherwise negative, [ ] otherwise unable to obtain    FH: no family history of sudden cardiac death in first degree relatives    SH: [ ] tobacco, [ ] alcohol, [ ] drugs    acetylcysteine  Oral Solution 1200 milliGRAM(s) Oral every 12 hours  atorvastatin 40 milliGRAM(s) Oral at bedtime  carvedilol 6.25 milliGRAM(s) Oral every 12 hours  chlorhexidine 2% Cloths 1 Application(s) Topical daily  dextrose 5%. 1000 milliLiter(s) IV Continuous <Continuous>  dextrose 5%. 1000 milliLiter(s) IV Continuous <Continuous>  dextrose 50% Injectable 25 Gram(s) IV Push once  dextrose 50% Injectable 12.5 Gram(s) IV Push once  dextrose 50% Injectable 25 Gram(s) IV Push once  dextrose Oral Gel 15 Gram(s) Oral once PRN  glucagon  Injectable 1 milliGRAM(s) IntraMuscular once  heparin   Injectable 5000 Unit(s) SubCutaneous every 12 hours  hydrALAZINE 50 milliGRAM(s) Oral three times a day  insulin glargine Injectable (LANTUS) 40 Unit(s) SubCutaneous at bedtime  insulin lispro (ADMELOG) corrective regimen sliding scale   SubCutaneous three times a day before meals  insulin lispro (ADMELOG) corrective regimen sliding scale   SubCutaneous at bedtime  insulin lispro Injectable (ADMELOG) 15 Unit(s) SubCutaneous three times a day before meals  sodium chloride 0.9% lock flush 3 milliLiter(s) IV Push every 8 hours  sodium zirconium cyclosilicate 10 Gram(s) Oral three times a day                            8.0    6.53  )-----------( 328      ( 12 Apr 2022 06:39 )             24.6       04-12    134<L>  |  104  |  32<H>  ----------------------------<  69<L>  5.8<H>   |  22  |  1.72<H>    Ca    8.8      12 Apr 2022 12:25  Phos  5.0     04-12  Mg     2.40     04-12    TPro  5.9<L>  /  Alb  x   /  TBili  x   /  DBili  x   /  AST  x   /  ALT  x   /  AlkPhos  x   04-12      T(C): 36.7 (04-12-22 @ 13:51), Max: 36.9 (04-11-22 @ 22:05)  HR: 91 (04-12-22 @ 13:51) (83 - 96)  BP: 176/94 (04-12-22 @ 13:51) (133/78 - 176/94)  RR: 18 (04-12-22 @ 13:51) (17 - 18)  SpO2: 100% (04-12-22 @ 13:51) (100% - 100%)  Wt(kg): --    I&O's Summary    11 Apr 2022 07:01  -  12 Apr 2022 07:00  --------------------------------------------------------  IN: 520 mL / OUT: 1950 mL / NET: -1430 mL      General: Well nourished in no acute distress. Alert and Oriented * 3.   Head: Normocephalic and atraumatic.   Neck: No JVD. No bruits. Supple. Does not appear to be enlarged.   Cardiovascular: + S1,S2 ; RRR Soft systolic murmur at the left lower sternal border. No rubs noted.    Lungs: CTA b/l. No rhonchi, rales or wheezes.   Abdomen: + BS, soft. Non tender. Non distended. No rebound. No guarding.   Extremities: No clubbing/cyanosis/edema.   Neurologic: Moves all four extremities. Full range of motion.   Skin: Warm and moist. The patient's skin has normal elasticity and good skin turgor.   Psychiatric: Appropriate mood and affect.  Musculoskeletal: Normal range of motion, normal strength    TELEMETRY: 	  Sinus     < from: Transthoracic Echocardiogram (04.01.22 @ 07:17) >  CONCLUSIONS:  1. Normal mitral valve. Mild mitral regurgitation.  2. Normal trileaflet aortic valve.  3. Aortic Root: 3.7 cm.  4. Severely dilated left atrium.  LA volume index = 66  cc/m2.  5. Moderate concentric left ventricular hypertrophy.  6. Moderate global left ventricular systolic dysfunction.  7. Grade I diastolic dysfunction (Impaired relaxation,  mild).  8. Normal right atrium.  9. Normal right ventricular size and systolic function  (TAPSE  1.9cm).  10. Unable to estimate RVSP.  11. There is trace tricuspid regurgitation.  12. There is trace pulmonic regurgitation.  13. Small pericardial effusion.  < end of copied text >      ASSESSMENT/PLAN: 	32y  Male  PMH of HTN, HLD, Varicose veins presents to the ED for SOB and cough positive trop elevated BNP found with acute systolic heart failure.    - PT with Elevated Ddimer on admit, VQ scan (-) and lower extremity dopplers (-)   - Echo with moderate LV systolic dysfunction   - s/p IV diuresis  - cont Coreg  - cath cancelled 4/4 due to elevated BP and possible IV site infection ---ID eval appreciated, Blood cx negative  - BP slowly improving, ok to increase hydralazine  -Renal eval noted, plan for renal bx this week when stable  -management of hyperkalemia per Renal  -Renal bx cannot be done on APT, so therefore will hold off on cardiac cath until after procedure  -cath when medically optimized   -Heme eval appreciated

## 2022-04-12 NOTE — PROGRESS NOTE ADULT - SUBJECTIVE AND OBJECTIVE BOX
Patient is a 32y old  Male who presents with a chief complaint of SOB (11 Apr 2022 13:06)      MEDICATIONS  (STANDING):  acetylcysteine  Oral Solution 1200 milliGRAM(s) Oral every 12 hours  atorvastatin 40 milliGRAM(s) Oral at bedtime  carvedilol 6.25 milliGRAM(s) Oral every 12 hours  chlorhexidine 2% Cloths 1 Application(s) Topical daily  dextrose 5%. 1000 milliLiter(s) (100 mL/Hr) IV Continuous <Continuous>  dextrose 5%. 1000 milliLiter(s) (50 mL/Hr) IV Continuous <Continuous>  dextrose 50% Injectable 25 Gram(s) IV Push once  dextrose 50% Injectable 12.5 Gram(s) IV Push once  dextrose 50% Injectable 25 Gram(s) IV Push once  glucagon  Injectable 1 milliGRAM(s) IntraMuscular once  heparin   Injectable 5000 Unit(s) SubCutaneous every 12 hours  hydrALAZINE 50 milliGRAM(s) Oral three times a day  insulin glargine Injectable (LANTUS) 40 Unit(s) SubCutaneous at bedtime  insulin lispro (ADMELOG) corrective regimen sliding scale   SubCutaneous three times a day before meals  insulin lispro (ADMELOG) corrective regimen sliding scale   SubCutaneous at bedtime  insulin lispro Injectable (ADMELOG) 15 Unit(s) SubCutaneous three times a day before meals  sodium chloride 0.9% lock flush 3 milliLiter(s) IV Push every 8 hours  sodium zirconium cyclosilicate 10 Gram(s) Oral three times a day    MEDICATIONS  (PRN):  dextrose Oral Gel 15 Gram(s) Oral once PRN Blood Glucose LESS THAN 70 milliGRAM(s)/deciliter      ROS  No fever, sweats, chills  No epistaxis, HA, sore throat  No CP, SOB, cough, sputum  No n/v/d, abd pain, melena, hematochezia  No edema  No rash  No anxiety  No back pain, joint pain  No bleeding, bruising  No dysuria, hematuria    Vital Signs Last 24 Hrs  T(C): 36.9 (12 Apr 2022 05:20), Max: 36.9 (11 Apr 2022 13:44)  T(F): 98.5 (12 Apr 2022 05:20), Max: 98.5 (11 Apr 2022 22:05)  HR: 83 (12 Apr 2022 05:20) (83 - 96)  BP: 143/83 (12 Apr 2022 05:20) (133/78 - 143/83)  BP(mean): --  RR: 18 (12 Apr 2022 05:20) (17 - 18)  SpO2: 100% (12 Apr 2022 05:20) (100% - 100%)    PE  NAD  Awake, alert  Anicteric, MMM  No c/c/e  No rash grossly                            8.0    6.53  )-----------( 328      ( 12 Apr 2022 06:39 )             24.6       04-12    136  |  105  |  35<H>  ----------------------------<  62<L>  5.4<H>   |  23  |  1.85<H>    Ca    8.6      12 Apr 2022 06:39  Phos  5.0     04-12  Mg     2.40     04-12    TPro  5.9<L>  /  Alb  x   /  TBili  x   /  DBili  x   /  AST  x   /  ALT  x   /  AlkPhos  x   04-12

## 2022-04-13 LAB
% ALBUMIN: 43.7 % — SIGNIFICANT CHANGE UP
% ALBUMIN: 45.4 % — SIGNIFICANT CHANGE UP
% ALPHA 1: 4 % — SIGNIFICANT CHANGE UP
% ALPHA 1: 4.1 % — SIGNIFICANT CHANGE UP
% ALPHA 2: 16.8 % — SIGNIFICANT CHANGE UP
% ALPHA 2: 18.2 % — SIGNIFICANT CHANGE UP
% BETA: 17.6 % — SIGNIFICANT CHANGE UP
% BETA: 17.9 % — SIGNIFICANT CHANGE UP
% GAMMA: 16.1 % — SIGNIFICANT CHANGE UP
% GAMMA: 16.3 % — SIGNIFICANT CHANGE UP
ALBUMIN SERPL ELPH-MCNC: 2.45 G/DL — LOW (ref 3.3–4.4)
ALBUMIN SERPL ELPH-MCNC: 2.68 G/DL — LOW (ref 3.3–4.4)
ALBUMIN/GLOB SERPL ELPH: 0.8 RATIO — SIGNIFICANT CHANGE UP
ALBUMIN/GLOB SERPL ELPH: 0.8 RATIO — SIGNIFICANT CHANGE UP
ALPHA1 GLOB SERPL ELPH-MCNC: 0.22 G/DL — SIGNIFICANT CHANGE UP (ref 0.1–0.3)
ALPHA1 GLOB SERPL ELPH-MCNC: 0.24 G/DL — SIGNIFICANT CHANGE UP (ref 0.1–0.3)
ALPHA2 GLOB SERPL ELPH-MCNC: 1 G/DL — SIGNIFICANT CHANGE UP (ref 0.6–1)
ALPHA2 GLOB SERPL ELPH-MCNC: 1 G/DL — SIGNIFICANT CHANGE UP (ref 0.6–1)
ANION GAP SERPL CALC-SCNC: 8 MMOL/L — SIGNIFICANT CHANGE UP (ref 7–14)
ANION GAP SERPL CALC-SCNC: 8 MMOL/L — SIGNIFICANT CHANGE UP (ref 7–14)
B-GLOBULIN SERPL ELPH-MCNC: 1 G/DL — SIGNIFICANT CHANGE UP (ref 0.6–1.1)
B-GLOBULIN SERPL ELPH-MCNC: 1.04 G/DL — SIGNIFICANT CHANGE UP (ref 0.6–1.1)
BASE EXCESS BLDV CALC-SCNC: -0.6 MMOL/L — SIGNIFICANT CHANGE UP (ref -2–3)
BLD GP AB SCN SERPL QL: NEGATIVE — SIGNIFICANT CHANGE UP
BLOOD GAS VENOUS COMPREHENSIVE RESULT: SIGNIFICANT CHANGE UP
BUN SERPL-MCNC: 36 MG/DL — HIGH (ref 7–23)
BUN SERPL-MCNC: 37 MG/DL — HIGH (ref 7–23)
CALCIUM SERPL-MCNC: 8.2 MG/DL — LOW (ref 8.4–10.5)
CALCIUM SERPL-MCNC: 8.9 MG/DL — SIGNIFICANT CHANGE UP (ref 8.4–10.5)
CHLORIDE BLDV-SCNC: 107 MMOL/L — SIGNIFICANT CHANGE UP (ref 96–108)
CHLORIDE SERPL-SCNC: 103 MMOL/L — SIGNIFICANT CHANGE UP (ref 98–107)
CHLORIDE SERPL-SCNC: 105 MMOL/L — SIGNIFICANT CHANGE UP (ref 98–107)
CO2 BLDV-SCNC: 26.3 MMOL/L — HIGH (ref 22–26)
CO2 SERPL-SCNC: 23 MMOL/L — SIGNIFICANT CHANGE UP (ref 22–31)
CO2 SERPL-SCNC: 23 MMOL/L — SIGNIFICANT CHANGE UP (ref 22–31)
CREAT SERPL-MCNC: 2.04 MG/DL — HIGH (ref 0.5–1.3)
CREAT SERPL-MCNC: 2.09 MG/DL — HIGH (ref 0.5–1.3)
EGFR: 42 ML/MIN/1.73M2 — LOW
EGFR: 44 ML/MIN/1.73M2 — LOW
GAMMA GLOBULIN: 0.91 G/DL — SIGNIFICANT CHANGE UP (ref 0.7–1.7)
GAMMA GLOBULIN: 0.95 G/DL — SIGNIFICANT CHANGE UP (ref 0.7–1.7)
GAS PNL BLDV: 135 MMOL/L — LOW (ref 136–145)
GAS PNL BLDV: SIGNIFICANT CHANGE UP
GLUCOSE BLDC GLUCOMTR-MCNC: 106 MG/DL — HIGH (ref 70–99)
GLUCOSE BLDC GLUCOMTR-MCNC: 113 MG/DL — HIGH (ref 70–99)
GLUCOSE BLDC GLUCOMTR-MCNC: 113 MG/DL — HIGH (ref 70–99)
GLUCOSE BLDC GLUCOMTR-MCNC: 114 MG/DL — HIGH (ref 70–99)
GLUCOSE BLDC GLUCOMTR-MCNC: 137 MG/DL — HIGH (ref 70–99)
GLUCOSE BLDC GLUCOMTR-MCNC: 145 MG/DL — HIGH (ref 70–99)
GLUCOSE BLDC GLUCOMTR-MCNC: 84 MG/DL — SIGNIFICANT CHANGE UP (ref 70–99)
GLUCOSE BLDV-MCNC: 90 MG/DL — SIGNIFICANT CHANGE UP (ref 70–99)
GLUCOSE SERPL-MCNC: 122 MG/DL — HIGH (ref 70–99)
GLUCOSE SERPL-MCNC: 71 MG/DL — SIGNIFICANT CHANGE UP (ref 70–99)
HCO3 BLDV-SCNC: 25 MMOL/L — SIGNIFICANT CHANGE UP (ref 22–29)
HCT VFR BLD CALC: 22.2 % — LOW (ref 39–50)
HCT VFR BLD CALC: 27.1 % — LOW (ref 39–50)
HCT VFR BLDA CALC: 27 % — LOW (ref 39–51)
HGB BLD CALC-MCNC: 9.1 G/DL — LOW (ref 13–17)
HGB BLD-MCNC: 7.4 G/DL — LOW (ref 13–17)
HGB BLD-MCNC: 8.7 G/DL — LOW (ref 13–17)
LACTATE BLDV-MCNC: 0.6 MMOL/L — SIGNIFICANT CHANGE UP (ref 0.5–2)
MAGNESIUM SERPL-MCNC: 2.3 MG/DL — SIGNIFICANT CHANGE UP (ref 1.6–2.6)
MAGNESIUM SERPL-MCNC: 2.3 MG/DL — SIGNIFICANT CHANGE UP (ref 1.6–2.6)
MCHC RBC-ENTMCNC: 31.3 PG — SIGNIFICANT CHANGE UP (ref 27–34)
MCHC RBC-ENTMCNC: 31.5 PG — SIGNIFICANT CHANGE UP (ref 27–34)
MCHC RBC-ENTMCNC: 32.1 GM/DL — SIGNIFICANT CHANGE UP (ref 32–36)
MCHC RBC-ENTMCNC: 33.3 GM/DL — SIGNIFICANT CHANGE UP (ref 32–36)
MCV RBC AUTO: 94.5 FL — SIGNIFICANT CHANGE UP (ref 80–100)
MCV RBC AUTO: 97.5 FL — SIGNIFICANT CHANGE UP (ref 80–100)
NRBC # BLD: 0 /100 WBCS — SIGNIFICANT CHANGE UP
NRBC # BLD: 0 /100 WBCS — SIGNIFICANT CHANGE UP
NRBC # FLD: 0 K/UL — SIGNIFICANT CHANGE UP
NRBC # FLD: 0 K/UL — SIGNIFICANT CHANGE UP
PCO2 BLDV: 44 MMHG — SIGNIFICANT CHANGE UP (ref 42–55)
PH BLDV: 7.36 — SIGNIFICANT CHANGE UP (ref 7.32–7.43)
PHOSPHATE SERPL-MCNC: 5.2 MG/DL — HIGH (ref 2.5–4.5)
PHOSPHATE SERPL-MCNC: 5.4 MG/DL — HIGH (ref 2.5–4.5)
PLATELET # BLD AUTO: 308 K/UL — SIGNIFICANT CHANGE UP (ref 150–400)
PLATELET # BLD AUTO: 331 K/UL — SIGNIFICANT CHANGE UP (ref 150–400)
PO2 BLDV: 36 MMHG — SIGNIFICANT CHANGE UP
POTASSIUM BLDV-SCNC: 5.3 MMOL/L — HIGH (ref 3.5–5.1)
POTASSIUM SERPL-MCNC: 5.4 MMOL/L — HIGH (ref 3.5–5.3)
POTASSIUM SERPL-MCNC: 5.5 MMOL/L — HIGH (ref 3.5–5.3)
POTASSIUM SERPL-SCNC: 5.4 MMOL/L — HIGH (ref 3.5–5.3)
POTASSIUM SERPL-SCNC: 5.5 MMOL/L — HIGH (ref 3.5–5.3)
PROT PATTERN SERPL ELPH-IMP: SIGNIFICANT CHANGE UP
PROT PATTERN SERPL ELPH-IMP: SIGNIFICANT CHANGE UP
PROT SERPL-MCNC: 5.6 G/DL — SIGNIFICANT CHANGE UP
PROT SERPL-MCNC: 5.9 G/DL — SIGNIFICANT CHANGE UP
RBC # BLD: 2.35 M/UL — LOW (ref 4.2–5.8)
RBC # BLD: 2.78 M/UL — LOW (ref 4.2–5.8)
RBC # FLD: 12.5 % — SIGNIFICANT CHANGE UP (ref 10.3–14.5)
RBC # FLD: 12.6 % — SIGNIFICANT CHANGE UP (ref 10.3–14.5)
RH IG SCN BLD-IMP: POSITIVE — SIGNIFICANT CHANGE UP
SAO2 % BLDV: 55.6 % — SIGNIFICANT CHANGE UP
SARS-COV-2 RNA SPEC QL NAA+PROBE: SIGNIFICANT CHANGE UP
SODIUM SERPL-SCNC: 134 MMOL/L — LOW (ref 135–145)
SODIUM SERPL-SCNC: 136 MMOL/L — SIGNIFICANT CHANGE UP (ref 135–145)
WBC # BLD: 6.06 K/UL — SIGNIFICANT CHANGE UP (ref 3.8–10.5)
WBC # BLD: 6.22 K/UL — SIGNIFICANT CHANGE UP (ref 3.8–10.5)
WBC # FLD AUTO: 6.06 K/UL — SIGNIFICANT CHANGE UP (ref 3.8–10.5)
WBC # FLD AUTO: 6.22 K/UL — SIGNIFICANT CHANGE UP (ref 3.8–10.5)

## 2022-04-13 RX ORDER — INSULIN HUMAN 100 [IU]/ML
5 INJECTION, SOLUTION SUBCUTANEOUS ONCE
Refills: 0 | Status: COMPLETED | OUTPATIENT
Start: 2022-04-13 | End: 2022-04-13

## 2022-04-13 RX ORDER — SODIUM ZIRCONIUM CYCLOSILICATE 10 G/10G
10 POWDER, FOR SUSPENSION ORAL THREE TIMES A DAY
Refills: 0 | Status: COMPLETED | OUTPATIENT
Start: 2022-04-13 | End: 2022-04-14

## 2022-04-13 RX ORDER — SODIUM CHLORIDE 9 MG/ML
250 INJECTION INTRAMUSCULAR; INTRAVENOUS; SUBCUTANEOUS ONCE
Refills: 0 | Status: COMPLETED | OUTPATIENT
Start: 2022-04-13 | End: 2022-04-13

## 2022-04-13 RX ORDER — ACETYLCYSTEINE 200 MG/ML
1200 VIAL (ML) MISCELLANEOUS EVERY 12 HOURS
Refills: 0 | Status: COMPLETED | OUTPATIENT
Start: 2022-04-13 | End: 2022-04-15

## 2022-04-13 RX ORDER — DEXTROSE 50 % IN WATER 50 %
25 SYRINGE (ML) INTRAVENOUS ONCE
Refills: 0 | Status: COMPLETED | OUTPATIENT
Start: 2022-04-13 | End: 2022-04-13

## 2022-04-13 RX ORDER — FUROSEMIDE 40 MG
20 TABLET ORAL ONCE
Refills: 0 | Status: COMPLETED | OUTPATIENT
Start: 2022-04-13 | End: 2022-04-13

## 2022-04-13 RX ADMIN — Medication 20 MILLIGRAM(S): at 09:24

## 2022-04-13 RX ADMIN — SODIUM ZIRCONIUM CYCLOSILICATE 10 GRAM(S): 10 POWDER, FOR SUSPENSION ORAL at 05:18

## 2022-04-13 RX ADMIN — SODIUM CHLORIDE 3 MILLILITER(S): 9 INJECTION INTRAMUSCULAR; INTRAVENOUS; SUBCUTANEOUS at 21:47

## 2022-04-13 RX ADMIN — INSULIN HUMAN 5 UNIT(S): 100 INJECTION, SOLUTION SUBCUTANEOUS at 13:48

## 2022-04-13 RX ADMIN — SODIUM CHLORIDE 250 MILLILITER(S): 9 INJECTION INTRAMUSCULAR; INTRAVENOUS; SUBCUTANEOUS at 13:49

## 2022-04-13 RX ADMIN — Medication 1200 MILLIGRAM(S): at 21:44

## 2022-04-13 RX ADMIN — Medication 25 MILLILITER(S): at 13:48

## 2022-04-13 RX ADMIN — Medication 100 MILLIGRAM(S): at 21:44

## 2022-04-13 RX ADMIN — SODIUM CHLORIDE 250 MILLILITER(S): 9 INJECTION INTRAMUSCULAR; INTRAVENOUS; SUBCUTANEOUS at 09:24

## 2022-04-13 RX ADMIN — Medication 100 MILLIGRAM(S): at 05:18

## 2022-04-13 RX ADMIN — SODIUM CHLORIDE 3 MILLILITER(S): 9 INJECTION INTRAMUSCULAR; INTRAVENOUS; SUBCUTANEOUS at 13:59

## 2022-04-13 RX ADMIN — CARVEDILOL PHOSPHATE 6.25 MILLIGRAM(S): 80 CAPSULE, EXTENDED RELEASE ORAL at 17:53

## 2022-04-13 RX ADMIN — CARVEDILOL PHOSPHATE 6.25 MILLIGRAM(S): 80 CAPSULE, EXTENDED RELEASE ORAL at 05:18

## 2022-04-13 RX ADMIN — CHLORHEXIDINE GLUCONATE 1 APPLICATION(S): 213 SOLUTION TOPICAL at 13:59

## 2022-04-13 RX ADMIN — SODIUM ZIRCONIUM CYCLOSILICATE 10 GRAM(S): 10 POWDER, FOR SUSPENSION ORAL at 12:45

## 2022-04-13 RX ADMIN — SODIUM ZIRCONIUM CYCLOSILICATE 10 GRAM(S): 10 POWDER, FOR SUSPENSION ORAL at 21:44

## 2022-04-13 RX ADMIN — INSULIN GLARGINE 40 UNIT(S): 100 INJECTION, SOLUTION SUBCUTANEOUS at 21:43

## 2022-04-13 RX ADMIN — Medication 15 UNIT(S): at 17:58

## 2022-04-13 RX ADMIN — SODIUM CHLORIDE 3 MILLILITER(S): 9 INJECTION INTRAMUSCULAR; INTRAVENOUS; SUBCUTANEOUS at 06:12

## 2022-04-13 RX ADMIN — INSULIN HUMAN 5 UNIT(S): 100 INJECTION, SOLUTION SUBCUTANEOUS at 09:21

## 2022-04-13 RX ADMIN — Medication 100 MILLIGRAM(S): at 13:55

## 2022-04-13 RX ADMIN — ATORVASTATIN CALCIUM 40 MILLIGRAM(S): 80 TABLET, FILM COATED ORAL at 21:44

## 2022-04-13 RX ADMIN — Medication 25 MILLILITER(S): at 09:22

## 2022-04-13 NOTE — PROGRESS NOTE ADULT - SUBJECTIVE AND OBJECTIVE BOX
Patient is a 32y old  Male who presents with a chief complaint of SOB (12 Apr 2022 13:49)      MEDICATIONS  (STANDING):  atorvastatin 40 milliGRAM(s) Oral at bedtime  carvedilol 6.25 milliGRAM(s) Oral every 12 hours  chlorhexidine 2% Cloths 1 Application(s) Topical daily  dextrose 5%. 1000 milliLiter(s) (100 mL/Hr) IV Continuous <Continuous>  dextrose 5%. 1000 milliLiter(s) (50 mL/Hr) IV Continuous <Continuous>  dextrose 50% Injectable 25 Gram(s) IV Push once  dextrose 50% Injectable 12.5 Gram(s) IV Push once  dextrose 50% Injectable 25 Gram(s) IV Push once  dextrose 50% Injectable 25 milliLiter(s) IV Push once  furosemide   Injectable 20 milliGRAM(s) IV Push once  glucagon  Injectable 1 milliGRAM(s) IntraMuscular once  hydrALAZINE 100 milliGRAM(s) Oral three times a day  insulin glargine Injectable (LANTUS) 40 Unit(s) SubCutaneous at bedtime  insulin lispro (ADMELOG) corrective regimen sliding scale   SubCutaneous three times a day before meals  insulin lispro (ADMELOG) corrective regimen sliding scale   SubCutaneous at bedtime  insulin lispro Injectable (ADMELOG) 15 Unit(s) SubCutaneous three times a day before meals  insulin regular  human recombinant 5 Unit(s) IV Push once  sodium chloride 0.9% Bolus 250 milliLiter(s) IV Bolus once  sodium chloride 0.9% lock flush 3 milliLiter(s) IV Push every 8 hours    MEDICATIONS  (PRN):  dextrose Oral Gel 15 Gram(s) Oral once PRN Blood Glucose LESS THAN 70 milliGRAM(s)/deciliter      ROS  c/o fatigue  No epistaxis, HA, sore throat  No CP, SOB, cough, sputum  No n/v/d, abd pain, melena, hematochezia  No edema  No rash  c/o anxiety  No back pain, joint pain  No bleeding, bruising  No dysuria, hematuria    Vital Signs Last 24 Hrs  T(C): 36.7 (13 Apr 2022 05:05), Max: 36.9 (12 Apr 2022 12:00)  T(F): 98 (13 Apr 2022 05:05), Max: 98.4 (12 Apr 2022 12:00)  HR: 90 (13 Apr 2022 05:05) (83 - 94)  BP: 132/71 (13 Apr 2022 05:05) (105/61 - 176/94)  BP(mean): --  RR: 18 (13 Apr 2022 05:05) (18 - 18)  SpO2: 99% (13 Apr 2022 05:05) (99% - 100%)    PE  NAD  Awake, alert  Anicteric, MMM  No c/c/e  No rash grossly                          7.4    6.22  )-----------( 308      ( 13 Apr 2022 02:39 )             22.2       04-13    134<L>  |  103  |  36<H>  ----------------------------<  122<H>  5.5<H>   |  23  |  2.09<H>    Ca    8.2<L>      13 Apr 2022 02:39  Phos  5.4     04-13  Mg     2.30     04-13    TPro  5.9<L>  /  Alb  x   /  TBili  x   /  DBili  x   /  AST  x   /  ALT  x   /  AlkPhos  x   04-12

## 2022-04-13 NOTE — PROGRESS NOTE ADULT - SUBJECTIVE AND OBJECTIVE BOX
C A R D I O L O G Y  **********************************     DATE OF SERVICE: 04-13-22    S: no chest pain or sob;   Review of systems otherwise (-)      Review of Systems:   Constitutional: [ ] fevers, [ ] chills.   Skin: [ ] dry skin. [ ] rashes.  Psychiatric: [ ] depression, [ ] anxiety.   Gastrointestinal: [ ] BRBPR, [ ] melena.   Neurological: [ ] confusion. [ ] seizures. [ ] shuffling gait.   Ears,Nose,Mouth and Throat: [ ] ear pain [ ] sore throat.   Eyes: [ ] diplopia.   Respiratory: [ ] hemoptysis. [ ] shortness of breath  Cardiovascular: See HPI above  Hematologic/Lymphatic: [ ] anemia. [ ] painful nodes. [ ] prolonged bleeding.   Genitourinary: [ ] hematuria. [ ] flank pain.   Endocrine: [ ] significant change in weight. [ ] intolerance to heat and cold.     Review of systems [ x] otherwise negative, [ ] otherwise unable to obtain    FH: no family history of sudden cardiac death in first degree relatives    SH: [ ] tobacco, [ ] alcohol, [ ] drugs      acetylcysteine  Oral Solution 1200 milliGRAM(s) Oral every 12 hours  atorvastatin 40 milliGRAM(s) Oral at bedtime  carvedilol 6.25 milliGRAM(s) Oral every 12 hours  chlorhexidine 2% Cloths 1 Application(s) Topical daily  dextrose 5%. 1000 milliLiter(s) IV Continuous <Continuous>  dextrose 5%. 1000 milliLiter(s) IV Continuous <Continuous>  dextrose 50% Injectable 25 Gram(s) IV Push once  dextrose 50% Injectable 12.5 Gram(s) IV Push once  dextrose 50% Injectable 25 Gram(s) IV Push once  dextrose Oral Gel 15 Gram(s) Oral once PRN  glucagon  Injectable 1 milliGRAM(s) IntraMuscular once  hydrALAZINE 100 milliGRAM(s) Oral three times a day  insulin glargine Injectable (LANTUS) 40 Unit(s) SubCutaneous at bedtime  insulin lispro (ADMELOG) corrective regimen sliding scale   SubCutaneous three times a day before meals  insulin lispro (ADMELOG) corrective regimen sliding scale   SubCutaneous at bedtime  insulin lispro Injectable (ADMELOG) 15 Unit(s) SubCutaneous three times a day before meals  sodium chloride 0.9% lock flush 3 milliLiter(s) IV Push every 8 hours  sodium zirconium cyclosilicate 10 Gram(s) Oral three times a day                            8.7    6.06  )-----------( 331      ( 13 Apr 2022 12:06 )             27.1       04-13    136  |  105  |  37<H>  ----------------------------<  71  5.4<H>   |  23  |  2.04<H>    Ca    8.9      13 Apr 2022 11:36  Phos  5.2     04-13  Mg     2.30     04-13    TPro  5.9<L>  /  Alb  x   /  TBili  x   /  DBili  x   /  AST  x   /  ALT  x   /  AlkPhos  x   04-12            T(C): 36.8 (04-13-22 @ 10:00), Max: 36.8 (04-12-22 @ 21:30)  HR: 90 (04-13-22 @ 10:00) (89 - 94)  BP: 133/68 (04-13-22 @ 10:00) (105/61 - 176/94)  RR: 18 (04-13-22 @ 10:00) (18 - 18)  SpO2: 100% (04-13-22 @ 10:00) (99% - 100%)  Wt(kg): --    I&O's Summary    12 Apr 2022 07:01  -  13 Apr 2022 07:00  --------------------------------------------------------  IN: 620 mL / OUT: 2350 mL / NET: -1730 mL    13 Apr 2022 07:01  -  13 Apr 2022 12:41  --------------------------------------------------------  IN: 0 mL / OUT: 1200 mL / NET: -1200 mL        General: Well nourished in no acute distress. Alert and Oriented * 3.   Head: Normocephalic and atraumatic.   Neck: No JVD. No bruits. Supple. Does not appear to be enlarged.   Cardiovascular: + S1,S2 ; RRR Soft systolic murmur at the left lower sternal border. No rubs noted.    Lungs: CTA b/l. No rhonchi, rales or wheezes.   Abdomen: + BS, soft. Non tender. Non distended. No rebound. No guarding.   Extremities: + edema in LE bilaterally   Neurologic: Moves all four extremities. Full range of motion.   Skin: Warm and moist. The patient's skin has normal elasticity and good skin turgor.   Psychiatric: Appropriate mood and affect.  Musculoskeletal: Normal range of motion, normal strength    TELEMETRY: SR 90's    < from: Transthoracic Echocardiogram (04.01.22 @ 07:17) >  CONCLUSIONS:  1. Normal mitral valve. Mild mitral regurgitation.  2. Normal trileaflet aortic valve.  3. Aortic Root: 3.7 cm.  4. Severely dilated left atrium.  LA volume index = 66  cc/m2.  5. Moderate concentric left ventricular hypertrophy.  6. Moderate global left ventricular systolic dysfunction.  7. Grade I diastolic dysfunction (Impaired relaxation,  mild).  8. Normal right atrium.  9. Normal right ventricular size and systolic function  (TAPSE  1.9cm).  10. Unable to estimate RVSP.  11. There is trace tricuspid regurgitation.  12. There is trace pulmonic regurgitation.  13. Small pericardial effusion.  < end of copied text >      ASSESSMENT/PLAN: 	32y  Male  PMH of HTN, HLD, Varicose veins presents to the ED for SOB and cough positive trop elevated BNP found with acute systolic heart failure.    - PT with Elevated Ddimer on admit, VQ scan (-) and lower extremity dopplers (-)   - Echo with moderate LV systolic dysfunction   - s/p IV diuresis  - cont Coreg  - cath cancelled 4/4 due to elevated BP and possible IV site infection ---ID eval appreciated, Blood cx negative  - BP has improved after increase in hydralazine dose  - Awaiting renal biopsy - follow up IR  - Will plan on cath after biopsy and when patient cleared for apt  - Repeat CBC today shows improvement in Hb - monitor h/h  - Treatment of hyperkalemia per renal   - Follow up heme regarding anemia workup and abnormal immunofixation   - Further cardiac workup pending above    Michelle Tracy MD

## 2022-04-13 NOTE — CHART NOTE - NSCHARTNOTEFT_GEN_A_CORE
Spoke with renal attending as patient's BMP shows K of 5.5. Renal attending recommends insulin 5 units x 1, 1/2 amp of d50 x 1, lasix 20 mg IV x 1, 250 ml bolus of NS x 1 and to repeat BMP 90 minutes after all recommendations given. Will continue to monitor.

## 2022-04-13 NOTE — CHART NOTE - NSCHARTNOTEFT_GEN_A_CORE
Pt came to IR suite for renal biopsy; found to be hypertensive with SBP ranging between high 150s and up to 180mmHg when pt was proned for procedure. As such, the procedure was cancelled as BP goals were not met, thus placing the pt at significant risk of intra and post-procedural hemorrhage.     SBP goal is < 150mmHg for 24hrs pre and 48hrs post-procedure. Pt requires improved BP control to undergo this procedure with minimized risks. Pt came to IR suite for renal biopsy; found to be hypertensive with SBP ranging between high 150s and up to 180mmHg when pt was proned for procedure. As such, the procedure was cancelled as BP goals were not met, thus placing the pt at significant risk of intra and post-procedural hemorrhage.     Blood pressure goal is SBP < 150mmHg AND DBP < 85 for 24hrs pre AND post-procedure; ideally SBP < 140mmHg and DBP < 80.     Pt requires improved BP control to undergo this procedure with minimized risks. Post-procedural BP control is also essential to minimize the risk of delayed bleeding complications.

## 2022-04-13 NOTE — PROGRESS NOTE ADULT - SUBJECTIVE AND OBJECTIVE BOX
[facility] NEPHROLOGY- PROGRESS NOTE    32y Male with history of HTN presents with SOB transferred for cardiac cath. Nephrology consulted for elevated Scr.    REVIEW OF SYSTEMS:  Gen: no changes in weight  Cards: no chest pain  Resp: no dyspnea  GI: no nausea or vomiting or diarrhea  Vascular: + LE edema (chronic)    [facility]      [facility] Medications: Medications reviewed      VITALS:  T(F): 98.2 (04-13-22 @ 10:00), Max: 98.4 (04-12-22 @ 12:00)  HR: 90 (04-13-22 @ 10:00)  BP: 133/68 (04-13-22 @ 10:00)  RR: 18 (04-13-22 @ 10:00)  SpO2: 100% (04-13-22 @ 10:00)  Wt(kg): --    04-12 @ 07:01  -  04-13 @ 07:00  --------------------------------------------------------  IN: 620 mL / OUT: 2350 mL / NET: -1730 mL    04-13 @ 07:01  -  04-13 @ 11:13  --------------------------------------------------------  IN: 0 mL / OUT: 1200 mL / NET: -1200 mL          PHYSICAL EXAM:    Gen: NAD, calm  Cards: RRR, +S1/S2, no M/G/R  Resp: CTA B/L  GI: soft, NT/ND, NABS  Vascular: + LE lymphedema        LABS:  04-13    134<L>  |  103  |  36<H>  ----------------------------<  122<H>  5.5<H>   |  23  |  2.09<H>    Ca    8.2<L>      13 Apr 2022 02:39  Phos  5.4     04-13  Mg     2.30     04-13    TPro  5.9<L>  /  Alb      /  TBili      /  DBili      /  AST      /  ALT      /  AlkPhos      04-12    Creatinine Trend: 2.09 <--, 1.72 <--, 1.85 <--, 1.73 <--, 1.79 <--, 1.81 <--, 2.05 <--, 1.85 <--, 2.37 <--                        7.4    6.22  )-----------( 308      ( 13 Apr 2022 02:39 )             22.2     Urine Studies:

## 2022-04-13 NOTE — PROGRESS NOTE ADULT - SUBJECTIVE AND OBJECTIVE BOX
`Patient is a 32y old  Male who presents with a chief complaint of SOB (2022 13:49)    PATIENT IS SEEN AND EXAMINED IN MEDICAL FLOOR.  NGT [    ]    SMALL [   ]      GT [   ]    ALLERGIES:  Keflex (Hives)      Daily     Daily Weight in k.9 (2022 05:05)    VITALS:    Vital Signs Last 24 Hrs  T(C): 36.7 (2022 05:05), Max: 36.9 (2022 12:00)  T(F): 98 (2022 05:05), Max: 98.4 (2022 12:00)  HR: 89 (2022 09:20) (83 - 94)  BP: 129/70 (2022 09:20) (105/61 - 176/94)  BP(mean): --  RR: 18 (2022 05:05) (18 - 18)  SpO2: 99% (2022 05:05) (99% - 100%)    LABS:    CBC Full  -  ( 2022 02:39 )  WBC Count : 6.22 K/uL  RBC Count : 2.35 M/uL  Hemoglobin : 7.4 g/dL  Hematocrit : 22.2 %  Platelet Count - Automated : 308 K/uL  Mean Cell Volume : 94.5 fL  Mean Cell Hemoglobin : 31.5 pg  Mean Cell Hemoglobin Concentration : 33.3 gm/dL  Auto Neutrophil # : x  Auto Lymphocyte # : x  Auto Monocyte # : x  Auto Eosinophil # : x  Auto Basophil # : x  Auto Neutrophil % : x  Auto Lymphocyte % : x  Auto Monocyte % : x  Auto Eosinophil % : x  Auto Basophil % : x    PT/INR - ( 2022 06:39 )   PT: 10.8 sec;   INR: 0.93 ratio         PTT - ( 2022 06:39 )  PTT:33.6 sec  04-13    134<L>  |  103  |  36<H>  ----------------------------<  122<H>  5.5<H>   |  23  |  2.09<H>    Ca    8.2<L>      2022 02:39  Phos  5.4     04-13  Mg     2.30     04-13    TPro  5.9<L>  /  Alb  x   /  TBili  x   /  DBili  x   /  AST  x   /  ALT  x   /  AlkPhos  x       CAPILLARY BLOOD GLUCOSE      POCT Blood Glucose.: 113 mg/dL (2022 09:20)  POCT Blood Glucose.: 145 mg/dL (2022 06:25)  POCT Blood Glucose.: 109 mg/dL (2022 21:34)  POCT Blood Glucose.: 144 mg/dL (2022 17:30)  POCT Blood Glucose.: 80 mg/dL (2022 12:10)  POCT Blood Glucose.: 76 mg/dL (2022 11:24)        LIVER FUNCTIONS - ( 2022 06:39 )  Alb: x     / Pro: 5.9 g/dL / ALK PHOS: x     / ALT: x     / AST: x     / GGT: x           Creatinine Trend: 2.09<--, 1.72<--, 1.85<--, 1.73<--, 1.79<--, 1.81<--  I&O's Summary    2022 07:01  -  2022 07:00  --------------------------------------------------------  IN: 620 mL / OUT: 2350 mL / NET: -1730 mL            .Blood Blood-Venous   @ 19:45   No Growth Final  --  --      .Blood Blood-Peripheral   @ 19:30   No Growth Final  --  --          MEDICATIONS:    MEDICATIONS  (STANDING):  atorvastatin 40 milliGRAM(s) Oral at bedtime  carvedilol 6.25 milliGRAM(s) Oral every 12 hours  chlorhexidine 2% Cloths 1 Application(s) Topical daily  dextrose 5%. 1000 milliLiter(s) (100 mL/Hr) IV Continuous <Continuous>  dextrose 5%. 1000 milliLiter(s) (50 mL/Hr) IV Continuous <Continuous>  dextrose 50% Injectable 25 Gram(s) IV Push once  dextrose 50% Injectable 12.5 Gram(s) IV Push once  dextrose 50% Injectable 25 Gram(s) IV Push once  glucagon  Injectable 1 milliGRAM(s) IntraMuscular once  hydrALAZINE 100 milliGRAM(s) Oral three times a day  insulin glargine Injectable (LANTUS) 40 Unit(s) SubCutaneous at bedtime  insulin lispro (ADMELOG) corrective regimen sliding scale   SubCutaneous three times a day before meals  insulin lispro (ADMELOG) corrective regimen sliding scale   SubCutaneous at bedtime  insulin lispro Injectable (ADMELOG) 15 Unit(s) SubCutaneous three times a day before meals  sodium chloride 0.9% lock flush 3 milliLiter(s) IV Push every 8 hours      MEDICATIONS  (PRN):  dextrose Oral Gel 15 Gram(s) Oral once PRN Blood Glucose LESS THAN 70 milliGRAM(s)/deciliter      REVIEW OF SYSTEMS:                           ALL ROS DONE [ X   ]    CONSTITUTIONAL:  LETHARGIC [   ], FEVER [   ], UNRESPONSIVE [   ]  CVS:  CP  [   ], SOB, [   ], PALPITATIONS [   ], DIZZYNESS [   ]  RS: COUGH [   ], SPUTUM [   ]  GI: ABDOMINAL PAIN [   ], NAUSEA [   ], VOMITINGS [   ], DIARRHEA [   ], CONSTIPATION [   ]  :  DYSURIA [   ], NOCTURIA [   ], INCREASED FREQUENCY [   ], DRIBLING [   ],  SKELETAL: PAINFUL JOINTS [   ], SWOLLEN JOINTS [   ], NECK ACHE [   ], LOW BACK ACHE [   ],  SKIN : ULCERS [   ], RASH [   ], ITCHING [   ]  CNS: HEAD ACHE [   ], DOUBLE VISION [   ], BLURRED VISION [   ], AMS / CONFUSION [   ], SEIZURES [   ], WEAKNESS [   ],TINGLING / NUMBNESS [   ]    PHYSICAL EXAMINATION:  GENERAL APPEARANCE: NO DISTRESS  HEENT:  NO PALLOR, NO  JVD,  NO   NODES, NECK SUPPLE  CVS: S1 +, S2 +,   RS: AEEB,  OCCASIONAL  RALES +,   NO RONCHI  ABD: SOFT, NT, NO, BS +  EXT: PE ++   LEFT FOREARM WOUND IMPROVED  SKIN: WARM,   SKELETAL:  ROM ACCEPTABLE  CNS:  AAO X 3    RADIOLOGY :    ACC: [id number] EXAM:  US DPLX LWR EXT VEINS COMPL BI                          PROCEDURE DATE:  2022          INTERPRETATION:  CLINICAL INFORMATION: Shortness of breath. Diabetes.    COMPARISON: None available.    TECHNIQUE: Duplex sonography of the BILATERAL LOWER extremity veins with   color and spectral Doppler, with and without compression. The examination   is technically limited secondary to subcutaneous edema in the calves and   body habitus.    FINDINGS:    RIGHT:  Normal compressibility of the RIGHT common femoral, femoral and popliteal   veins.  Doppler examination shows normal spontaneous and phasic flow.  Calf veins not seen.    Subcutaneous edema in the calf.    LEFT:  Normal compressibility of the LEFT common femoral, femoral and popliteal   veins.  Doppler examination shows normal spontaneous and phasic flow.  Calf veins not seen.    Subcutaneous edema in the calf.    IMPRESSION:    Technically limited study including nonvisualization of the calf veins   with no evidence of deep venous thrombosis in the visualized bilateral   lower extremity veins.    =========================    ACC: [id number] EXAM:  NM PULM VENTILATION PERFUS IMG                          PROCEDURE DATE:  2022          INTERPRETATION:  CLINICAL INFORMATION: 32 year old male with dyspnea,   elevated serum creatinine level; referred to evaluate for pulmonary   embolism.    RADIOPHARMACEUTICAL: 1.0 mCi Tc-99m-DTPA via inhalation; 6.2 mCi   Tc-99m-MAA, I.V.    TECHNIQUE:  Ventilation and perfusion images of the lungs were obtained   following administration of Tc-99m-DTPA and Tc-99m-MAA. Images were   obtained in the anterior, posterior, both lateral, and all 4 oblique   projections. The study was interpreted in conjunction with a chest   radiograph of 3/31/2022.    COMPARISON: No prior lung V/Q scan.    FINDINGS: There is heterogeneous distribution of radiopharmaceutical in   the lungs on the ventilation and perfusion images. There are no segmental   perfusion defects.    IMPRESSION: Very low probability of pulmonary embolus.      ASSESSMENT :       PLAN:  HPI:  32 year old male with Autism (without limitations of functionality and intellect; able to work and sign his own consent per mother who is at bedside)HTN, diabetes mellitus type 2, lymphedema since age 12 that was recently diagnosed who presented to [facility] ED 3/31/22 complaining of SOB and cough x1week. Patient states that he has not taken his Lantus 35units suc bedtime/Lispro 15unites with meals or Lisinopril 40mg po daily as he ran out of refills and has been waiting for an appointment.  He has noted increased abdominal weight with tighter pants along with SOB walking (can not quantify).  Patient states he was about to take off on a flight to [place] when he started having SOB and a cough with productive of clear sputum while on the airplane on the [employer].  Flight crew activated EMS and patient was BIBA to [facility]. Patient R/O MI with negative troponins. Labs were remarkable for Cr 1.83 and BNP was 2900 for which he was started on IV Lasix diuresis for acute systolic and diastolic CHF and Ddimer 335 with unremarkable LE dopplers and V/Q scan with very low probability of PE. Denies chest pain, fever, chills, abd pain, N/V/D.   Renal consulted for elevated Cr, likely due to DM2. Started on Mucomyst 1200mg po bid x4 doses in anticipation for cath.  Underwent an transthoracic Echo revealing EF 40-45%, mod global LV systolic dysfunction, mod concentric LV hypertrophy, sev dil LA, no sig valvular disease.   In light of patients cardiac risk factors, symptoms and abnormal noninvasive test findings the patient is now transferred to [facility] for a cardiac catheterization to evaluate for ischemic etiology of CHF.   MEDS at [facility]: Coreg 3.125mg po BID, ASA 81mg po daily, Lipitor 40mg po daily, lisinopril 40mg po daily, Lantus 40units subcutaneous bedtime, lasix 40mg IV BID, Lispro 15units subc TID meals    Denies fever, cough, chills, headache, flu like symptoms, sick contact or recent travel  COVID PCR not detected on 4/3/22    UA with protein and small blood. Check spot Upr/Cr3  Pt will eventually benefit from ACEi/ARB due to proteinuria and HF, once renal function is stable (post cath).    (2022 14:13)    # CASE D/W MOTHER, ALBERTO SIMMONS @ BEDSIDE [] - ALL QUESTIONS ANSWERED    # HYPERKALEMIA - PLACED ON LOKELMA, F/U REPEAT POTASSIUM  - [] - GIVEN INSULIN + D50, LASIX, 250MG IVF BOLUS    # NORMOCYTIC ANEMIA - F/U ANEMIA PANEL  - TRANSFUSION THRESHOLD HGB < 7    # ? LEFT FOREARM PHLEBITIS [IMPROVING]  - BCX [NGTD], MONITOR SITE  - WARM COMPRESSES, ARM ELEVATION  - ID CONSULT IN PROGRESS    # SUSPECT NEW ONSET CHF - SYSTOLIC AND DIASTOLIC DYSFUNCTION  # HYPERTENSIVE URGENCY, HTN  # B/L LE EDEMA - SUSPECT S/T CHF AND ?VARICOSE VEINS  - MONITOR ON TELEMETRY, TRENDED TROPONINS  - STATIN, COREG; HOLDING DIURETICS  - HOLD ASA  - REVIEWED TSH/LIPID PANEL/HBA1C  - CARDIOLOGY CONSULT    - ECHO - MILD MR, DILATED LA, CONCENTRIC LVH, GLOBAL LV SYSTOLIC DYSFUNCTION, G1DD  - TRANSFERRED TO [facility] FOR CARDIAC CATH    # ELEVATED D-DIMER  - V/Q SCAN - LOW PROBABILITY PE  - DOPPLER LE - NEGATIVE FOR DVT    # POSITIVE SERUM IMMUNOFIXATION  - HEME/ONC CONSULT    # SUSANA VS. CKD - REVIEWED UA, MONITOR CR, AVOID NEPHROTOXIC AGENTS  - REVIEWED RENAL U/S  - NEPHROLOGY CONSULT IN PROGRESS    - F/U RENAL BX [DELAYED TO , GIVEN HYPERKALEMIA] ; PER IR DR. MYERS, CASE CANCELLED DUE TO EPISODE OF HYPERTENSION      - CR IMPROVING SINCE DIURETICS HELD    # SUSPECT CAMERON  - OUTPATIENT SLEEP STUDY    # MORBID OBESITY   - NUTRITION CONSULT    # DM2, NONCOMPLIANT W/ INSULIN - HBA1C 9.4 - LANTUS, SSI + FS    - HYPOGLYCEMIA, REDUCING DOSE OF INSULIN, MONITORING FINGERSTICKS    # HLD - STARTED STATIN, REVIEWED LIPID PANEL    # GI AND DVT PPX      Patient is a 32y old  Male who presents with a chief complaint of SOB (2022 13:49)    PATIENT IS SEEN AND EXAMINED IN MEDICAL FLOOR.    ALLERGIES:  Keflex (Hives)    Daily     Daily Weight in k.9 (2022 05:05)    VITALS:    Vital Signs Last 24 Hrs  T(C): 36.7 (2022 05:05), Max: 36.9 (2022 12:00)  T(F): 98 (2022 05:05), Max: 98.4 (2022 12:00)  HR: 89 (2022 09:20) (83 - 94)  BP: 129/70 (2022 09:20) (105/61 - 176/94)  BP(mean): --  RR: 18 (2022 05:05) (18 - 18)  SpO2: 99% (2022 05:05) (99% - 100%)    LABS:    CBC Full  -  ( 2022 02:39 )  WBC Count : 6.22 K/uL  RBC Count : 2.35 M/uL  Hemoglobin : 7.4 g/dL  Hematocrit : 22.2 %  Platelet Count - Automated : 308 K/uL  Mean Cell Volume : 94.5 fL  Mean Cell Hemoglobin : 31.5 pg  Mean Cell Hemoglobin Concentration : 33.3 gm/dL  Auto Neutrophil # : x  Auto Lymphocyte # : x  Auto Monocyte # : x  Auto Eosinophil # : x  Auto Basophil # : x  Auto Neutrophil % : x  Auto Lymphocyte % : x  Auto Monocyte % : x  Auto Eosinophil % : x  Auto Basophil % : x    PT/INR - ( 2022 06:39 )   PT: 10.8 sec;   INR: 0.93 ratio         PTT - ( 2022 06:39 )  PTT:33.6 sec  04-13    134<L>  |  103  |  36<H>  ----------------------------<  122<H>  5.5<H>   |  23  |  2.09<H>    Ca    8.2<L>      2022 02:39  Phos  5.4     04-13  Mg     2.30     04-13    TPro  5.9<L>  /  Alb  x   /  TBili  x   /  DBili  x   /  AST  x   /  ALT  x   /  AlkPhos  x   04-12    CAPILLARY BLOOD GLUCOSE      POCT Blood Glucose.: 113 mg/dL (2022 09:20)  POCT Blood Glucose.: 145 mg/dL (2022 06:25)  POCT Blood Glucose.: 109 mg/dL (2022 21:34)  POCT Blood Glucose.: 144 mg/dL (2022 17:30)  POCT Blood Glucose.: 80 mg/dL (2022 12:10)  POCT Blood Glucose.: 76 mg/dL (2022 11:24)        LIVER FUNCTIONS - ( 2022 06:39 )  Alb: x     / Pro: 5.9 g/dL / ALK PHOS: x     / ALT: x     / AST: x     / GGT: x           Creatinine Trend: 2.09<--, 1.72<--, 1.85<--, 1.73<--, 1.79<--, 1.81<--  I&O's Summary    2022 07:01  -  2022 07:00  --------------------------------------------------------  IN: 620 mL / OUT: 2350 mL / NET: -1730 mL            .Blood Blood-Venous   @ 19:45   No Growth Final  --  --      .Blood Blood-Peripheral   @ 19:30   No Growth Final  --  --          MEDICATIONS:    MEDICATIONS  (STANDING):  atorvastatin 40 milliGRAM(s) Oral at bedtime  carvedilol 6.25 milliGRAM(s) Oral every 12 hours  chlorhexidine 2% Cloths 1 Application(s) Topical daily  dextrose 5%. 1000 milliLiter(s) (100 mL/Hr) IV Continuous <Continuous>  dextrose 5%. 1000 milliLiter(s) (50 mL/Hr) IV Continuous <Continuous>  dextrose 50% Injectable 25 Gram(s) IV Push once  dextrose 50% Injectable 12.5 Gram(s) IV Push once  dextrose 50% Injectable 25 Gram(s) IV Push once  glucagon  Injectable 1 milliGRAM(s) IntraMuscular once  hydrALAZINE 100 milliGRAM(s) Oral three times a day  insulin glargine Injectable (LANTUS) 40 Unit(s) SubCutaneous at bedtime  insulin lispro (ADMELOG) corrective regimen sliding scale   SubCutaneous three times a day before meals  insulin lispro (ADMELOG) corrective regimen sliding scale   SubCutaneous at bedtime  insulin lispro Injectable (ADMELOG) 15 Unit(s) SubCutaneous three times a day before meals  sodium chloride 0.9% lock flush 3 milliLiter(s) IV Push every 8 hours      MEDICATIONS  (PRN):  dextrose Oral Gel 15 Gram(s) Oral once PRN Blood Glucose LESS THAN 70 milliGRAM(s)/deciliter      REVIEW OF SYSTEMS:                           ALL ROS DONE [ X   ]    CONSTITUTIONAL:  LETHARGIC [   ], FEVER [   ], UNRESPONSIVE [   ]  CVS:  CP  [   ], SOB, [   ], PALPITATIONS [   ], DIZZYNESS [   ]  RS: COUGH [   ], SPUTUM [   ]  GI: ABDOMINAL PAIN [   ], NAUSEA [   ], VOMITINGS [   ], DIARRHEA [   ], CONSTIPATION [   ]  :  DYSURIA [   ], NOCTURIA [   ], INCREASED FREQUENCY [   ], DRIBLING [   ],  SKELETAL: PAINFUL JOINTS [   ], SWOLLEN JOINTS [   ], NECK ACHE [   ], LOW BACK ACHE [   ],  SKIN : ULCERS [   ], RASH [   ], ITCHING [   ]  CNS: HEAD ACHE [   ], DOUBLE VISION [   ], BLURRED VISION [   ], AMS / CONFUSION [   ], SEIZURES [   ], WEAKNESS [   ],TINGLING / NUMBNESS [   ]    PHYSICAL EXAMINATION:  GENERAL APPEARANCE: NO DISTRESS  HEENT:  NO PALLOR, NO  JVD,  NO   NODES, NECK SUPPLE  CVS: S1 +, S2 +,   RS: AEEB,  OCCASIONAL  RALES +,   NO RONCHI  ABD: SOFT, NT, NO, BS +  EXT: PE ++   LEFT FOREARM WOUND IMPROVED  SKIN: WARM,   SKELETAL:  ROM ACCEPTABLE  CNS:  AAO X 3    RADIOLOGY :    ACC: [id number] EXAM:  US DPLX LWR EXT VEINS COMPL BI                          PROCEDURE DATE:  2022          INTERPRETATION:  CLINICAL INFORMATION: Shortness of breath. Diabetes.    COMPARISON: None available.    TECHNIQUE: Duplex sonography of the BILATERAL LOWER extremity veins with   color and spectral Doppler, with and without compression. The examination   is technically limited secondary to subcutaneous edema in the calves and   body habitus.    FINDINGS:    RIGHT:  Normal compressibility of the RIGHT common femoral, femoral and popliteal   veins.  Doppler examination shows normal spontaneous and phasic flow.  Calf veins not seen.    Subcutaneous edema in the calf.    LEFT:  Normal compressibility of the LEFT common femoral, femoral and popliteal   veins.  Doppler examination shows normal spontaneous and phasic flow.  Calf veins not seen.    Subcutaneous edema in the calf.    IMPRESSION:    Technically limited study including nonvisualization of the calf veins   with no evidence of deep venous thrombosis in the visualized bilateral   lower extremity veins.    =========================    ACC: [id number] EXAM:  NM PULM VENTILATION PERFUS IMG                          PROCEDURE DATE:  2022          INTERPRETATION:  CLINICAL INFORMATION: 32 year old male with dyspnea,   elevated serum creatinine level; referred to evaluate for pulmonary   embolism.    RADIOPHARMACEUTICAL: 1.0 mCi Tc-99m-DTPA via inhalation; 6.2 mCi   Tc-99m-MAA, I.V.    TECHNIQUE:  Ventilation and perfusion images of the lungs were obtained   following administration of Tc-99m-DTPA and Tc-99m-MAA. Images were   obtained in the anterior, posterior, both lateral, and all 4 oblique   projections. The study was interpreted in conjunction with a chest   radiograph of 3/31/2022.    COMPARISON: No prior lung V/Q scan.    FINDINGS: There is heterogeneous distribution of radiopharmaceutical in   the lungs on the ventilation and perfusion images. There are no segmental   perfusion defects.    IMPRESSION: Very low probability of pulmonary embolus.      ASSESSMENT :       PLAN:  HPI:  32 year old male with Autism (without limitations of functionality and intellect; able to work and sign his own consent per mother who is at bedside)HTN, diabetes mellitus type 2, lymphedema since age 12 that was recently diagnosed who presented to [facility] ED 3/31/22 complaining of SOB and cough x1week. Patient states that he has not taken his Lantus 35units suc bedtime/Lispro 15unites with meals or Lisinopril 40mg po daily as he ran out of refills and has been waiting for an appointment.  He has noted increased abdominal weight with tighter pants along with SOB walking (can not quantify).  Patient states he was about to take off on a flight to [place] when he started having SOB and a cough with productive of clear sputum while on the airplane on the [employer].  Flight crew activated EMS and patient was BIBA to [facility]. Patient R/O MI with negative troponins. Labs were remarkable for Cr 1.83 and BNP was 2900 for which he was started on IV Lasix diuresis for acute systolic and diastolic CHF and Ddimer 335 with unremarkable LE dopplers and V/Q scan with very low probability of PE. Denies chest pain, fever, chills, abd pain, N/V/D.   Renal consulted for elevated Cr, likely due to DM2. Started on Mucomyst 1200mg po bid x4 doses in anticipation for cath.  Underwent an transthoracic Echo revealing EF 40-45%, mod global LV systolic dysfunction, mod concentric LV hypertrophy, sev dil LA, no sig valvular disease.   In light of patients cardiac risk factors, symptoms and abnormal noninvasive test findings the patient is now transferred to [facility] for a cardiac catheterization to evaluate for ischemic etiology of CHF.   MEDS at [facility]: Coreg 3.125mg po BID, ASA 81mg po daily, Lipitor 40mg po daily, lisinopril 40mg po daily, Lantus 40units subcutaneous bedtime, lasix 40mg IV BID, Lispro 15units subc TID meals    Denies fever, cough, chills, headache, flu like symptoms, sick contact or recent travel  COVID PCR not detected on 4/3/22    UA with protein and small blood. Check spot Upr/Cr3  Pt will eventually benefit from ACEi/ARB due to proteinuria and HF, once renal function is stable (post cath).    (2022 14:13)    # CASE D/W MOTHER, ALBERTO SIMMONS @ BEDSIDE [] - ALL QUESTIONS ANSWERED    # HYPERKALEMIA - PLACED ON LOKELMA, F/U REPEAT POTASSIUM  - [] - GIVEN INSULIN + D50, LASIX, 250MG IVF BOLUS    # NORMOCYTIC ANEMIA - F/U ANEMIA PANEL  - TRANSFUSION THRESHOLD HGB < 7    # ? LEFT FOREARM PHLEBITIS [IMPROVING]  - BCX [NGTD], MONITOR SITE  - WARM COMPRESSES, ARM ELEVATION  - ID CONSULT IN PROGRESS    # SUSPECT NEW ONSET CHF - SYSTOLIC AND DIASTOLIC DYSFUNCTION  # HYPERTENSIVE URGENCY, HTN  # B/L LE EDEMA - SUSPECT S/T CHF AND ?VARICOSE VEINS  - MONITOR ON TELEMETRY, TRENDED TROPONINS  - STATIN, COREG; HOLDING DIURETICS  - HOLD ASA  - REVIEWED TSH/LIPID PANEL/HBA1C  - CARDIOLOGY CONSULT    - ECHO - MILD MR, DILATED LA, CONCENTRIC LVH, GLOBAL LV SYSTOLIC DYSFUNCTION, G1DD  - TRANSFERRED TO [facility] FOR CARDIAC CATH    # ELEVATED D-DIMER  - V/Q SCAN - LOW PROBABILITY PE  - DOPPLER LE - NEGATIVE FOR DVT    # POSITIVE SERUM IMMUNOFIXATION  - HEME/ONC CONSULT    # SUSANA VS. CKD - REVIEWED UA, MONITOR CR, AVOID NEPHROTOXIC AGENTS  - REVIEWED RENAL U/S  - NEPHROLOGY CONSULT IN PROGRESS    - F/U RENAL BX [DELAYED TO , GIVEN HYPERKALEMIA] ; PER IR DR. MYERS, CASE CANCELLED DUE TO EPISODE OF HYPERTENSION      - CR IMPROVING SINCE DIURETICS HELD    # SUSPECT CAMERON  - OUTPATIENT SLEEP STUDY    # MORBID OBESITY   - NUTRITION CONSULT    # DM2, NONCOMPLIANT W/ INSULIN - HBA1C 9.4 - LANTUS, SSI + FS    - HYPOGLYCEMIA, REDUCING DOSE OF INSULIN, MONITORING FINGERSTICKS    # HLD - STARTED STATIN, REVIEWED LIPID PANEL    # GI AND DVT PPX

## 2022-04-13 NOTE — PROGRESS NOTE ADULT - ASSESSMENT
32y Male with history of HTN presents with SOB transferred for cardiac cath. Nephrology consulted for elevated Scr.    1) SUSANA: likely hemodynamically mediated in setting of diuresis and ACE-I use. Scr increased this morning due to NPO status on 4/12? UA with microscopic hematuria and subnephrotic range proteinuria. FeUrea low. Renal US negative for obstruction. Avoid nephrotoxins.    2) CKD Unspecified: Patient denies h/o CKD? but will need to confirm baseline with PMD (Dr. Luís Mast in [place]). Suspect patient with underlying diabetic nephropathy given microscopic hematuria and subnephrotic range proteinuria and large kidneys on renal US. Follow up proteinuria and GN serologies (PLA2R ab, ANCA), Patient with positive serum ZARA for which Heme following. YESSICA positive but with negative dsDNA and complements making autoimmune disease unlikely. Patient planned for IR guided kidney biopsy today (cancelled on 4/12 due to uncontrolled HTN). Would transfuse 1 unit PRBC if repeat CBC confirms anemia given risks of bleeding during kidney biopsy. Monitor electrolytes.    3) HTN with CKD: BP improving. Continue with current medications. Holding lisinopril given SUSANA. Monitor BP.    4) LE edema: Improving. Do not suspect lymphedema will improve significantly with diuresis. Holding bumex 1 mg PO daily given SUSANA and plan for cardiac cath on 4/14 however will resume post-cath for maintenance. TTE with moderate global LVSD and diastolic dysfunction. Monitor UO.    5) Hyperkalemia: Mild s/p medical management this morning. Repeat serum potassium pending. Continue with lokelma and low K diet. Monitor serum K.    If patient planned for cardiac cath, patient educated that he has a 26% risk of developing WANDA and 1% risk of requiring RRT given risk factors of CHF, anemia, DM and CKD. Will start mucomyst 1200 mg PO Q12 hours X 4 doses the evening prior to cath. Defer IVF given moderate global LVSD on TTE.      [facility] NEPHROLOGY  Han Choudhury M.D.  Mekhi Verdin D.O.  Camille Brown M.D.  Lorena Wesley, ROLLY, ANP-C    Telephone: [phone]  Facsimile: [phone]    [address] [place] [address]

## 2022-04-13 NOTE — PROGRESS NOTE ADULT - ASSESSMENT
Hematology/Oncology consulted on this 32 year old male with hx of Autism, HTN, DM and Lymphedema who presented to [facility] with complaints of SOB and cough who underwent a TTE revealing CHF and transferred to [facility] 4/4 for a cardiac cath and found to have weak iga band..    Elevated SPEP  --ZARA Kappa 9.92  --ZARA Jocelyn 5.43  --Immunofixation with a weak IgA band is present. Polyclonal kappa, Lambda, IgG and IgM seen, this is likely inflammatory  --repeat SPEP/UPEP,ZARA,QUIGGS and Iron studies as follows: -- repeat Iron studies as follows: TIBC 218, Folate/B12 nml, immunofixation with weak IgA band present, polyclonal Kappa,  Lambda, IgG and IgM present from 4/12    Anemia  --Hgb 7.4 today  --may be due to renal disease  --If acute drop, please transfuse for a Hgb < 7.0  --Ferritin 585, Iron sat 48%    SUSANA  --Creatinine 2.09 today  --renal biopsy pending for when blood pressure is better controlled,  will have  congo stain sent ( Spoke with Dr Verdin nephrologist)  - Consider additional imaging if monoclonal IgA persists  --renal following    CHF  --diuretics  --Management per cardiology    Patient may follow with Dr Joseph of [facility] after discharge  Thank you for allowing us to participate in Mr Garcia's care    Lela Berkowitz NP  Hematology/Oncology  [facility]  [phone] (Office)  [phone] (Alt office)  Evenings and weekends please call MD on call or office

## 2022-04-14 LAB
ANION GAP SERPL CALC-SCNC: 10 MMOL/L — SIGNIFICANT CHANGE UP (ref 7–14)
BUN SERPL-MCNC: 37 MG/DL — HIGH (ref 7–23)
CALCIUM SERPL-MCNC: 8.4 MG/DL — SIGNIFICANT CHANGE UP (ref 8.4–10.5)
CHLORIDE SERPL-SCNC: 104 MMOL/L — SIGNIFICANT CHANGE UP (ref 98–107)
CO2 SERPL-SCNC: 23 MMOL/L — SIGNIFICANT CHANGE UP (ref 22–31)
CREAT SERPL-MCNC: 2.06 MG/DL — HIGH (ref 0.5–1.3)
EGFR: 43 ML/MIN/1.73M2 — LOW
GLUCOSE BLDC GLUCOMTR-MCNC: 108 MG/DL — HIGH (ref 70–99)
GLUCOSE BLDC GLUCOMTR-MCNC: 155 MG/DL — HIGH (ref 70–99)
GLUCOSE BLDC GLUCOMTR-MCNC: 156 MG/DL — HIGH (ref 70–99)
GLUCOSE BLDC GLUCOMTR-MCNC: 157 MG/DL — HIGH (ref 70–99)
GLUCOSE BLDC GLUCOMTR-MCNC: 90 MG/DL — SIGNIFICANT CHANGE UP (ref 70–99)
GLUCOSE BLDC GLUCOMTR-MCNC: 94 MG/DL — SIGNIFICANT CHANGE UP (ref 70–99)
GLUCOSE SERPL-MCNC: 110 MG/DL — HIGH (ref 70–99)
HCT VFR BLD CALC: 23.1 % — LOW (ref 39–50)
HGB BLD-MCNC: 7.8 G/DL — LOW (ref 13–17)
MAGNESIUM SERPL-MCNC: 2.3 MG/DL — SIGNIFICANT CHANGE UP (ref 1.6–2.6)
MCHC RBC-ENTMCNC: 32.4 PG — SIGNIFICANT CHANGE UP (ref 27–34)
MCHC RBC-ENTMCNC: 33.8 GM/DL — SIGNIFICANT CHANGE UP (ref 32–36)
MCV RBC AUTO: 95.9 FL — SIGNIFICANT CHANGE UP (ref 80–100)
NRBC # BLD: 0 /100 WBCS — SIGNIFICANT CHANGE UP
NRBC # FLD: 0 K/UL — SIGNIFICANT CHANGE UP
PHOSPHATE SERPL-MCNC: 5.4 MG/DL — HIGH (ref 2.5–4.5)
PLATELET # BLD AUTO: 305 K/UL — SIGNIFICANT CHANGE UP (ref 150–400)
POTASSIUM SERPL-MCNC: 5.2 MMOL/L — SIGNIFICANT CHANGE UP (ref 3.5–5.3)
POTASSIUM SERPL-SCNC: 5.2 MMOL/L — SIGNIFICANT CHANGE UP (ref 3.5–5.3)
RBC # BLD: 2.41 M/UL — LOW (ref 4.2–5.8)
RBC # FLD: 12.7 % — SIGNIFICANT CHANGE UP (ref 10.3–14.5)
SODIUM SERPL-SCNC: 137 MMOL/L — SIGNIFICANT CHANGE UP (ref 135–145)
WBC # BLD: 5.77 K/UL — SIGNIFICANT CHANGE UP (ref 3.8–10.5)
WBC # FLD AUTO: 5.77 K/UL — SIGNIFICANT CHANGE UP (ref 3.8–10.5)

## 2022-04-14 PROCEDURE — 93454 CORONARY ARTERY ANGIO S&I: CPT | Mod: 26

## 2022-04-14 RX ORDER — INSULIN LISPRO 100/ML
5 VIAL (ML) SUBCUTANEOUS ONCE
Refills: 0 | Status: COMPLETED | OUTPATIENT
Start: 2022-04-14 | End: 2022-04-14

## 2022-04-14 RX ORDER — HEPARIN SODIUM 5000 [USP'U]/ML
5000 INJECTION INTRAVENOUS; SUBCUTANEOUS EVERY 12 HOURS
Refills: 0 | Status: DISCONTINUED | OUTPATIENT
Start: 2022-04-14 | End: 2022-04-15

## 2022-04-14 RX ADMIN — CARVEDILOL PHOSPHATE 6.25 MILLIGRAM(S): 80 CAPSULE, EXTENDED RELEASE ORAL at 06:22

## 2022-04-14 RX ADMIN — SODIUM CHLORIDE 3 MILLILITER(S): 9 INJECTION INTRAMUSCULAR; INTRAVENOUS; SUBCUTANEOUS at 13:25

## 2022-04-14 RX ADMIN — Medication 1200 MILLIGRAM(S): at 21:52

## 2022-04-14 RX ADMIN — Medication 100 MILLIGRAM(S): at 13:39

## 2022-04-14 RX ADMIN — ATORVASTATIN CALCIUM 40 MILLIGRAM(S): 80 TABLET, FILM COATED ORAL at 21:53

## 2022-04-14 RX ADMIN — Medication 100 MILLIGRAM(S): at 06:22

## 2022-04-14 RX ADMIN — Medication 15 UNIT(S): at 09:05

## 2022-04-14 RX ADMIN — CARVEDILOL PHOSPHATE 6.25 MILLIGRAM(S): 80 CAPSULE, EXTENDED RELEASE ORAL at 21:52

## 2022-04-14 RX ADMIN — SODIUM CHLORIDE 3 MILLILITER(S): 9 INJECTION INTRAMUSCULAR; INTRAVENOUS; SUBCUTANEOUS at 06:14

## 2022-04-14 RX ADMIN — Medication 5 UNIT(S): at 13:39

## 2022-04-14 RX ADMIN — SODIUM CHLORIDE 3 MILLILITER(S): 9 INJECTION INTRAMUSCULAR; INTRAVENOUS; SUBCUTANEOUS at 21:47

## 2022-04-14 RX ADMIN — SODIUM ZIRCONIUM CYCLOSILICATE 10 GRAM(S): 10 POWDER, FOR SUSPENSION ORAL at 06:22

## 2022-04-14 RX ADMIN — Medication 1200 MILLIGRAM(S): at 06:27

## 2022-04-14 RX ADMIN — INSULIN GLARGINE 40 UNIT(S): 100 INJECTION, SOLUTION SUBCUTANEOUS at 21:51

## 2022-04-14 RX ADMIN — Medication 100 MILLIGRAM(S): at 21:51

## 2022-04-14 RX ADMIN — HEPARIN SODIUM 5000 UNIT(S): 5000 INJECTION INTRAVENOUS; SUBCUTANEOUS at 21:54

## 2022-04-14 RX ADMIN — CHLORHEXIDINE GLUCONATE 1 APPLICATION(S): 213 SOLUTION TOPICAL at 13:25

## 2022-04-14 NOTE — CHART NOTE - NSCHARTNOTEFT_GEN_A_CORE
Patient is s/p cardiac cath via right radial access. Patient without any complaints. Site is stable with no hematoma or active bleed noted. No swelling, dressing is clean/intact/dry. Right Radial pulse palpable.  strength is equal bilaterally. Patient has full ROM in right wrist. Capillary refill < 2 seconds. Will continue to monitor closely.     Janie Vail PA-C

## 2022-04-14 NOTE — PROVIDER CONTACT NOTE (OTHER) - DATE AND TIME:
12-Apr-2022 13:51
05-Apr-2022 23:00
05-Apr-2022 23:00
07-Apr-2022 17:43
14-Apr-2022 22:00
11-Apr-2022 09:03

## 2022-04-14 NOTE — CHART NOTE - NSCHARTNOTESELECT_GEN_ALL_CORE
ACP/Event Note
Event Note
Event Note
Pre-Procedure/Event Note
ir/Event Note
Event Note
Follow Up/Nutrition Services
IR Procedural Cancellation/Event Note

## 2022-04-14 NOTE — PROGRESS NOTE ADULT - SUBJECTIVE AND OBJECTIVE BOX
C A R D I O L O G Y  **********************************     DATE OF SERVICE: 04-14-22    S: no chest pain or sob or dizziness;   Review of systems otherwise (-)      Review of Systems:   Constitutional: [ ] fevers, [ ] chills.   Skin: [ ] dry skin. [ ] rashes.  Psychiatric: [ ] depression, [ ] anxiety.   Gastrointestinal: [ ] BRBPR, [ ] melena.   Neurological: [ ] confusion. [ ] seizures. [ ] shuffling gait.   Ears,Nose,Mouth and Throat: [ ] ear pain [ ] sore throat.   Eyes: [ ] diplopia.   Respiratory: [ ] hemoptysis. [ ] shortness of breath  Cardiovascular: See HPI above  Hematologic/Lymphatic: [ ] anemia. [ ] painful nodes. [ ] prolonged bleeding.   Genitourinary: [ ] hematuria. [ ] flank pain.   Endocrine: [ ] significant change in weight. [ ] intolerance to heat and cold.     Review of systems [ x] otherwise negative, [ ] otherwise unable to obtain    FH: no family history of sudden cardiac death in first degree relatives    SH: [ ] tobacco, [ ] alcohol, [ ] drugs      acetylcysteine  Oral Solution 1200 milliGRAM(s) Oral every 12 hours  atorvastatin 40 milliGRAM(s) Oral at bedtime  carvedilol 6.25 milliGRAM(s) Oral every 12 hours  chlorhexidine 2% Cloths 1 Application(s) Topical daily  dextrose 5%. 1000 milliLiter(s) IV Continuous <Continuous>  dextrose 5%. 1000 milliLiter(s) IV Continuous <Continuous>  dextrose 50% Injectable 25 Gram(s) IV Push once  dextrose 50% Injectable 12.5 Gram(s) IV Push once  dextrose 50% Injectable 25 Gram(s) IV Push once  dextrose Oral Gel 15 Gram(s) Oral once PRN  glucagon  Injectable 1 milliGRAM(s) IntraMuscular once  heparin   Injectable 5000 Unit(s) SubCutaneous every 12 hours  hydrALAZINE 100 milliGRAM(s) Oral three times a day  insulin glargine Injectable (LANTUS) 40 Unit(s) SubCutaneous at bedtime  insulin lispro (ADMELOG) corrective regimen sliding scale   SubCutaneous three times a day before meals  insulin lispro (ADMELOG) corrective regimen sliding scale   SubCutaneous at bedtime  insulin lispro Injectable (ADMELOG) 15 Unit(s) SubCutaneous three times a day before meals  sodium chloride 0.9% lock flush 3 milliLiter(s) IV Push every 8 hours                            7.8    5.77  )-----------( 305      ( 14 Apr 2022 07:16 )             23.1       04-14    137  |  104  |  37<H>  ----------------------------<  110<H>  5.2   |  23  |  2.06<H>    Ca    8.4      14 Apr 2022 07:16  Phos  5.4     04-14  Mg     2.30     04-14              T(C): 37 (04-14-22 @ 12:09), Max: 37 (04-14-22 @ 12:09)  HR: 100 (04-14-22 @ 12:09) (87 - 103)  BP: 126/69 (04-14-22 @ 12:09) (117/62 - 149/72)  RR: 17 (04-14-22 @ 12:09) (16 - 18)  SpO2: 100% (04-14-22 @ 12:09) (100% - 100%)  Wt(kg): --    I&O's Summary    13 Apr 2022 07:01  -  14 Apr 2022 07:00  --------------------------------------------------------  IN: 250 mL / OUT: 3200 mL / NET: -2950 mL        General: Well nourished in no acute distress. Alert and Oriented * 3.   Head: Normocephalic and atraumatic.   Neck: No JVD. No bruits. Supple. Does not appear to be enlarged.   Cardiovascular: + S1,S2 ; RRR Soft systolic murmur at the left lower sternal border. No rubs noted.    Lungs: CTA b/l. No rhonchi, rales or wheezes.   Abdomen: + BS, soft. Non tender. Non distended. No rebound. No guarding.   Extremities: + edema in LE bilaterally   Neurologic: Moves all four extremities. Full range of motion.   Skin: Warm and moist. The patient's skin has normal elasticity and good skin turgor.   Psychiatric: Appropriate mood and affect.  Musculoskeletal: Normal range of motion, normal strength    TELEMETRY: SR 90's    < from: Transthoracic Echocardiogram (04.01.22 @ 07:17) >  CONCLUSIONS:  1. Normal mitral valve. Mild mitral regurgitation.  2. Normal trileaflet aortic valve.  3. Aortic Root: 3.7 cm.  4. Severely dilated left atrium.  LA volume index = 66  cc/m2.  5. Moderate concentric left ventricular hypertrophy.  6. Moderate global left ventricular systolic dysfunction.  7. Grade I diastolic dysfunction (Impaired relaxation,  mild).  8. Normal right atrium.  9. Normal right ventricular size and systolic function  (TAPSE  1.9cm).  10. Unable to estimate RVSP.  11. There is trace tricuspid regurgitation.  12. There is trace pulmonic regurgitation.  13. Small pericardial effusion.  < end of copied text >      ASSESSMENT/PLAN: 	32y  Male  PMH of HTN, HLD, Varicose veins presents to the ED for SOB and cough positive trop elevated BNP found with acute systolic heart failure.    - PT with Elevated Ddimer on admit, VQ scan (-) and lower extremity dopplers (-)   - Echo with moderate LV systolic dysfunction   - s/p IV diuresis with improvement in volume status   - cont Coreg  - cath cancelled 4/4 due to elevated BP and possible IV site infection ---ID eval appreciated, Blood cx negative  - BP has improved after increase in hydralazine dose  - Renal biopsy cancelled due to elevated BP - pt. remains normotensive on current regimen - will continue for now   - Discussed with renal - renal biopsy can be performed as outpatient  - Given above, plan for [facility] today  - Follow up heme-onc    Michelle Tracy MD

## 2022-04-14 NOTE — PROGRESS NOTE ADULT - SUBJECTIVE AND OBJECTIVE BOX
Patient is a 32y old  Male who presents with a chief complaint of SOB (2022 12:48)    PATIENT IS SEEN AND EXAMINED IN MEDICAL FLOOR.  NGT [    ]    SMALL [   ]      GT [   ]    ALLERGIES:  Keflex (Hives)      Daily     Daily Weight in k.9 (2022 07:17)    VITALS:    Vital Signs Last 24 Hrs  T(C): 36.8 (2022 21:18), Max: 37 (2022 12:09)  T(F): 98.3 (2022 21:18), Max: 98.6 (2022 12:09)  HR: 107 (2022 21:18) (98 - 107)  BP: 132/63 (2022 21:18) (117/62 - 156/97)  BP(mean): --  RR: 18 (2022 21:18) (16 - 18)  SpO2: 99% (2022 21:18) (99% - 100%)    LABS:    CBC Full  -  ( 2022 07:16 )  WBC Count : 5.77 K/uL  RBC Count : 2.41 M/uL  Hemoglobin : 7.8 g/dL  Hematocrit : 23.1 %  Platelet Count - Automated : 305 K/uL  Mean Cell Volume : 95.9 fL  Mean Cell Hemoglobin : 32.4 pg  Mean Cell Hemoglobin Concentration : 33.8 gm/dL  Auto Neutrophil # : x  Auto Lymphocyte # : x  Auto Monocyte # : x  Auto Eosinophil # : x  Auto Basophil # : x  Auto Neutrophil % : x  Auto Lymphocyte % : x  Auto Monocyte % : x  Auto Eosinophil % : x  Auto Basophil % : x      04-14    137  |  104  |  37<H>  ----------------------------<  110<H>  5.2   |  23  |  2.06<H>    Ca    8.4      2022 07:16  Phos  5.4     04-14  Mg     2.30     04-14      CAPILLARY BLOOD GLUCOSE      POCT Blood Glucose.: 155 mg/dL (2022 21:14)  POCT Blood Glucose.: 156 mg/dL (2022 19:45)  POCT Blood Glucose.: 157 mg/dL (2022 16:04)  POCT Blood Glucose.: 90 mg/dL (2022 13:18)  POCT Blood Glucose.: 94 mg/dL (2022 12:21)  POCT Blood Glucose.: 108 mg/dL (2022 08:29)          Creatinine Trend: 2.06<--, 2.04<--, 2.09<--, 1.72<--, 1.85<--, 1.73<--  I&O's Summary    2022 07:01  -  2022 07:00  --------------------------------------------------------  IN: 250 mL / OUT: 3200 mL / NET: -2950 mL    2022 07:01  -  2022 21:38  --------------------------------------------------------  IN: 360 mL / OUT: 700 mL / NET: -340 mL            .Blood Blood-Venous   @ 19:45   No Growth Final  --  --      .Blood Blood-Peripheral   @ 19:30   No Growth Final  --  --          MEDICATIONS:    MEDICATIONS  (STANDING):  acetylcysteine  Oral Solution 1200 milliGRAM(s) Oral every 12 hours  atorvastatin 40 milliGRAM(s) Oral at bedtime  carvedilol 6.25 milliGRAM(s) Oral every 12 hours  chlorhexidine 2% Cloths 1 Application(s) Topical daily  dextrose 5%. 1000 milliLiter(s) (50 mL/Hr) IV Continuous <Continuous>  dextrose 5%. 1000 milliLiter(s) (100 mL/Hr) IV Continuous <Continuous>  dextrose 50% Injectable 25 Gram(s) IV Push once  dextrose 50% Injectable 12.5 Gram(s) IV Push once  dextrose 50% Injectable 25 Gram(s) IV Push once  glucagon  Injectable 1 milliGRAM(s) IntraMuscular once  heparin   Injectable 5000 Unit(s) SubCutaneous every 12 hours  hydrALAZINE 100 milliGRAM(s) Oral three times a day  insulin glargine Injectable (LANTUS) 40 Unit(s) SubCutaneous at bedtime  insulin lispro (ADMELOG) corrective regimen sliding scale   SubCutaneous three times a day before meals  insulin lispro (ADMELOG) corrective regimen sliding scale   SubCutaneous at bedtime  insulin lispro Injectable (ADMELOG) 15 Unit(s) SubCutaneous three times a day before meals  sodium chloride 0.9% lock flush 3 milliLiter(s) IV Push every 8 hours      MEDICATIONS  (PRN):  dextrose Oral Gel 15 Gram(s) Oral once PRN Blood Glucose LESS THAN 70 milliGRAM(s)/deciliter      REVIEW OF SYSTEMS:                           ALL ROS DONE [ X   ]    CONSTITUTIONAL:  LETHARGIC [   ], FEVER [   ], UNRESPONSIVE [   ]  CVS:  CP  [   ], SOB, [   ], PALPITATIONS [   ], DIZZYNESS [   ]  RS: COUGH [   ], SPUTUM [   ]  GI: ABDOMINAL PAIN [   ], NAUSEA [   ], VOMITINGS [   ], DIARRHEA [   ], CONSTIPATION [   ]  :  DYSURIA [   ], NOCTURIA [   ], INCREASED FREQUENCY [   ], DRIBLING [   ],  SKELETAL: PAINFUL JOINTS [   ], SWOLLEN JOINTS [   ], NECK ACHE [   ], LOW BACK ACHE [   ],  SKIN : ULCERS [   ], RASH [   ], ITCHING [   ]  CNS: HEAD ACHE [   ], DOUBLE VISION [   ], BLURRED VISION [   ], AMS / CONFUSION [   ], SEIZURES [   ], WEAKNESS [   ],TINGLING / NUMBNESS [   ]    PHYSICAL EXAMINATION:  GENERAL APPEARANCE: NO DISTRESS  HEENT:  NO PALLOR, NO  JVD,  NO   NODES, NECK SUPPLE  CVS: S1 +, S2 +,   RS: AEEB,  OCCASIONAL  RALES +,   NO RONCHI  ABD: SOFT, NT, NO, BS +  EXT: PE ++   LEFT FOREARM WOUND IMPROVED  SKIN: WARM,   SKELETAL:  ROM ACCEPTABLE  CNS:  AAO X 3    RADIOLOGY :    ACC: [id number] EXAM:  US DPLX LWR EXT VEINS COMPL BI                          PROCEDURE DATE:  2022          INTERPRETATION:  CLINICAL INFORMATION: Shortness of breath. Diabetes.    COMPARISON: None available.    TECHNIQUE: Duplex sonography of the BILATERAL LOWER extremity veins with   color and spectral Doppler, with and without compression. The examination   is technically limited secondary to subcutaneous edema in the calves and   body habitus.    FINDINGS:    RIGHT:  Normal compressibility of the RIGHT common femoral, femoral and popliteal   veins.  Doppler examination shows normal spontaneous and phasic flow.  Calf veins not seen.    Subcutaneous edema in the calf.    LEFT:  Normal compressibility of the LEFT common femoral, femoral and popliteal   veins.  Doppler examination shows normal spontaneous and phasic flow.  Calf veins not seen.    Subcutaneous edema in the calf.    IMPRESSION:    Technically limited study including nonvisualization of the calf veins   with no evidence of deep venous thrombosis in the visualized bilateral   lower extremity veins.    =========================    ACC: [id number] EXAM:  NM PULM VENTILATION PERFUS IMG                          PROCEDURE DATE:  2022          INTERPRETATION:  CLINICAL INFORMATION: 32 year old male with dyspnea,   elevated serum creatinine level; referred to evaluate for pulmonary   embolism.    RADIOPHARMACEUTICAL: 1.0 mCi Tc-99m-DTPA via inhalation; 6.2 mCi   Tc-99m-MAA, I.V.    TECHNIQUE:  Ventilation and perfusion images of the lungs were obtained   following administration of Tc-99m-DTPA and Tc-99m-MAA. Images were   obtained in the anterior, posterior, both lateral, and all 4 oblique   projections. The study was interpreted in conjunction with a chest   radiograph of 3/31/2022.    COMPARISON: No prior lung V/Q scan.    FINDINGS: There is heterogeneous distribution of radiopharmaceutical in   the lungs on the ventilation and perfusion images. There are no segmental   perfusion defects.    IMPRESSION: Very low probability of pulmonary embolus.      ASSESSMENT :       PLAN:  HPI:  32 year old male with Autism (without limitations of functionality and intellect; able to work and sign his own consent per mother who is at bedside)HTN, diabetes mellitus type 2, lymphedema since age 12 that was recently diagnosed who presented to [facility] ED 3/31/22 complaining of SOB and cough x1week. Patient states that he has not taken his Lantus 35units suc bedtime/Lispro 15unites with meals or Lisinopril 40mg po daily as he ran out of refills and has been waiting for an appointment.  He has noted increased abdominal weight with tighter pants along with SOB walking (can not quantify).  Patient states he was about to take off on a flight to [place] when he started having SOB and a cough with productive of clear sputum while on the airplane on the [place].  Flight crew activated EMS and patient was BIBA to [facility]. Patient R/O MI with negative troponins. Labs were remarkable for Cr 1.83 and BNP was 2900 for which he was started on IV Lasix diuresis for acute systolic and diastolic CHF and Ddimer 335 with unremarkable LE dopplers and V/Q scan with very low probability of PE. Denies chest pain, fever, chills, abd pain, N/V/D.   Renal consulted for elevated Cr, likely due to DM2. Started on Mucomyst 1200mg po bid x4 doses in anticipation for cath.  Underwent an transthoracic Echo revealing EF 40-45%, mod global LV systolic dysfunction, mod concentric LV hypertrophy, sev dil LA, no sig valvular disease.   In light of patients cardiac risk factors, symptoms and abnormal noninvasive test findings the patient is now transferred to [facility] 22 for a cardiac catheterization to evaluate for ischemic etiology of CHF.   MEDS at [facility]: Coreg 3.125mg po BID, ASA 81mg po daily, Lipitor 40mg po daily, lisinopril 40mg po daily, Lantus 40units subcutaneous bedtime, lasix 40mg IV BID, Lispro 15units subc TID meals    Denies fever, cough, chills, headache, flu like symptoms, sick contact or recent travel  COVID PCR not detected on 4/3/22    UA with protein and small blood. Check spot Upr/Cr3  Pt will eventually benefit from ACEi/ARB due to proteinuria and HF, once renal function is stable (post cath).    (2022 14:13)    # CASE D/W MOTHER, ALBERTO SIMMONS VIA PHONE @ [phone] [] - CASE DISCUSSED AT LENGTH, ALL QUESTIONS ANSWERED    # NORMOCYTIC ANEMIA - F/U ANEMIA PANEL  - TRANSFUSION THRESHOLD HGB < 7    # ? LEFT FOREARM PHLEBITIS [IMPROVING]  - BCX [NGTD], MONITOR SITE  - WARM COMPRESSES, ARM ELEVATION  - ID CONSULT IN PROGRESS    # SUSPECT NEW ONSET CHF - SYSTOLIC AND DIASTOLIC DYSFUNCTION  # HYPERTENSIVE URGENCY, HTN  # B/L LE EDEMA - SUSPECT S/T CHF AND ?VARICOSE VEINS  - MONITOR ON TELEMETRY, TRENDED TROPONINS  - STATIN, COREG; HOLDING DIURETICS  - HOLD ASA  - REVIEWED TSH/LIPID PANEL/HBA1C  - CARDIOLOGY CONSULT    - ECHO - MILD MR, DILATED LA, CONCENTRIC LVH, GLOBAL LV SYSTOLIC DYSFUNCTION, G1DD  - TRANSFERRED TO [facility] FOR CARDIAC CATH    - PLANNED FOR CARDIAC CATH []    # ELEVATED D-DIMER  - V/Q SCAN - LOW PROBABILITY PE  - DOPPLER LE - NEGATIVE FOR DVT    # POSITIVE SERUM IMMUNOFIXATION  - HEME/ONC CONSULT    # SUSANA ON LIKELY CKD - REVIEWED UA, MONITOR CR, AVOID NEPHROTOXIC AGENTS  - REVIEWED RENAL U/S  - NEPHROLOGY CONSULT IN PROGRESS    - F/U RENAL BX [DELAYED TO , GIVEN HYPERKALEMIA] ; PER IR  CASE CANCELLED DUE TO EPISODES OF HYPERTENSION  WHILE PATIENT IS ON PROCEDURE TABLE X 2    - D/W NEPHROLOGY - PLANNED FOR OUTPATIENT BIOPSY    # HYPERKALEMIA - PLACED ON LOKELMA  - [] - GIVEN INSULIN + D50, LASIX, 250MG IVF BOLUS    # SUSPECT CAMERON  - OUTPATIENT SLEEP STUDY    # MORBID OBESITY   - NUTRITION CONSULT    # DM2, NONCOMPLIANT W/ INSULIN - HBA1C 9.4 - LANTUS, SSI + FS ; DOSE ADJUST WHEN NPO    - HYPOGLYCEMIA, REDUCING DOSE OF INSULIN, MONITORING FINGERSTICKS    # HLD - STARTED STATIN, REVIEWED LIPID PANEL    # GI AND DVT PPX

## 2022-04-14 NOTE — PROVIDER CONTACT NOTE (OTHER) - SITUATION
Pt NPO - FS 90 - Hold pre-meal insulin of 15 Units
Pt received cardicac cath procedure; not on floor to receive 18:00 premeal insulin with dinner; upon returning to the floor post cath pt did not want to eat.
before dinner fingerstick
B/P 176/94
FS 89 then 108 then 106; Pt due for 40 unite Lantus
Pt Midline placed 4/4/22 during cath procedure due to previous IV infiltrating; Midline slightly difficult to flush, dressing changed due to old dressing slightly damp & bloody.

## 2022-04-14 NOTE — PROVIDER CONTACT NOTE (OTHER) - ACTION/TREATMENT ORDERED:
LADI Aquino made aware. 15 units admelog pre-meal not given, one time dose of 9 units admelog pre meal ordered for dinner time on 4/7/22. Next FS to be completed before bedtime.
As per ACP due to pt not wanting to eat, hold pre meal insulin & administer normal night time regimen as per FS parameter.  upon check, administered Lantus as ordered.
ACP Jeannette notified
LADI Marcano Notified, advised to hold pre-meal 15 units of insulin
ACP notified. Dressing changed, now c/d/i.
As per ACP Lantus adjusted to 32 units; Insulin administered & pt re-educated on s/s of hypoglycemia; told to report s/s if he feels low, pt verbalized understanding.

## 2022-04-14 NOTE — PROGRESS NOTE ADULT - SUBJECTIVE AND OBJECTIVE BOX
[facility] NEPHROLOGY- PROGRESS NOTE    32y Male with history of HTN presents with SOB transferred for cardiac cath. Nephrology consulted for elevated Scr.    REVIEW OF SYSTEMS:  Gen: no changes in weight  Cards: no chest pain  Resp: no dyspnea  GI: no nausea or vomiting or diarrhea  Vascular: + LE edema (chronic)    Keflex (Hives)      Hospital Medications: Medications reviewed      VITALS:  T(F): 98.6 (04-14-22 @ 12:09), Max: 98.6 (04-14-22 @ 12:09)  HR: 99 (04-14-22 @ 13:37)  BP: 156/97 (04-14-22 @ 13:37)  RR: 17 (04-14-22 @ 12:09)  SpO2: 100% (04-14-22 @ 12:09)  Wt(kg): --    04-13 @ 07:01  -  04-14 @ 07:00  --------------------------------------------------------  IN: 250 mL / OUT: 3200 mL / NET: -2950 mL      PHYSICAL EXAM:    Gen: NAD, calm  Cards: RRR, +S1/S2, no M/G/R  Resp: CTA B/L  GI: soft, NT/ND, NABS  Vascular: + LE lymphedema      LABS:  04-14    137  |  104  |  37<H>  ----------------------------<  110<H>  5.2   |  23  |  2.06<H>    Ca    8.4      14 Apr 2022 07:16  Phos  5.4     04-14  Mg     2.30     04-14      Creatinine Trend: 2.06 <--, 2.04 <--, 2.09 <--, 1.72 <--, 1.85 <--, 1.73 <--, 1.79 <--, 1.81 <--, 2.05 <--, 1.85 <--                        7.8    5.77  )-----------( 305      ( 14 Apr 2022 07:16 )             23.1     Urine Studies:

## 2022-04-14 NOTE — PROGRESS NOTE ADULT - NS ATTEND AMEND GEN_ALL_CORE FT
Patient seen and examined.  Agree with above.   Plan for renal biopsy  Since renal biopsy cannot be performed with apt, will plan on cath after renal biopsy    Michelle Tracy MD
resting comfortably able to lie flat.  pending renal biopsy, which, off of aspirin could be on Monday.  following this can schedule coronary angiogram (which requires brief heparinization, but carries the possibility of long duration dual antiplatelet therapy afterwards.  due to the mandatory order of procedures, will follow until he is timing-appropriate for coronary angiography.  stable from CHF perspective.  EF 40-45%. at this time no role for permanent pacing or ICD therapy.  will follow.
Patient seen and examined.  Agree with above.   Procedure cancelled by IR due to elevated blood pressure   Hydralazine increased for better BP control   Awaiting renal biopsy after medical optimization   Cath after renal biopsy and patient cleared for apt    Michelle Tracy MD
Patient seen and examined.  Agree with above.   S/p hyperkalemia treatment  Biopsy cancelled per IR today - plans for biopsy tomorrow  Cath after patient cleared by renal and for apt     Michelle Tracy MD
31 y/o male with history of hypertension, autism, and diabetes admitted with dyspnea and cough. Hematology consulted for abnormal SPEP.     - Anemia likely secondary to renal disease and inflammation. Monitor CBC and maintain hemoglobin > 7.  - Noted ZARA. Weak IgA present on immunofixation. Repeat labs in 3 months as outpatient.   - Pending renal biopsy and cardiac catheterization once blood pressure is optimized.    Lloyd Rich MD  Hematology/Oncology  O: [phone]
none

## 2022-04-14 NOTE — PROGRESS NOTE ADULT - NUTRITIONAL ASSESSMENT
This patient has been assessed with a concern for Malnutrition and has been determined to have a diagnosis/diagnoses of Morbid obesity (BMI > 40).    This patient is being managed with:   Diet DASH/TLC-  Sodium & Cholesterol Restricted  No Concentrated Potassium  No Concentrated Phosphorus  Entered: Apr 12 2022  3:12PM    Diet NPO after Midnight-     NPO Start Date: 12-Apr-2022   NPO Start Time: 23:59  Except Medications  Entered: Apr 12 2022  3:12PM    
This patient has been assessed with a concern for Malnutrition and has been determined to have a diagnosis/diagnoses of Morbid obesity (BMI > 40).    This patient is being managed with:   Diet DASH/TLC-  Sodium & Cholesterol Restricted  Entered: Apr 4 2022  3:29PM    
This patient has been assessed with a concern for Malnutrition and has been determined to have a diagnosis/diagnoses of Morbid obesity (BMI > 40).    This patient is being managed with:   Diet DASH/TLC-  Sodium & Cholesterol Restricted  Entered: Apr 4 2022  3:29PM    
This patient has been assessed with a concern for Malnutrition and has been determined to have a diagnosis/diagnoses of Morbid obesity (BMI > 40).    This patient is being managed with:   Diet DASH/TLC-  Sodium & Cholesterol Restricted  No Concentrated Potassium  No Concentrated Phosphorus  Entered: Apr 12 2022  3:12PM

## 2022-04-14 NOTE — PROGRESS NOTE ADULT - SUBJECTIVE AND OBJECTIVE BOX
Patient is a 32y old  Male who presents with a chief complaint of SOB (13 Apr 2022 12:41)      MEDICATIONS  (STANDING):  acetylcysteine  Oral Solution 1200 milliGRAM(s) Oral every 12 hours  atorvastatin 40 milliGRAM(s) Oral at bedtime  carvedilol 6.25 milliGRAM(s) Oral every 12 hours  chlorhexidine 2% Cloths 1 Application(s) Topical daily  dextrose 5%. 1000 milliLiter(s) (50 mL/Hr) IV Continuous <Continuous>  dextrose 5%. 1000 milliLiter(s) (100 mL/Hr) IV Continuous <Continuous>  dextrose 50% Injectable 25 Gram(s) IV Push once  dextrose 50% Injectable 12.5 Gram(s) IV Push once  dextrose 50% Injectable 25 Gram(s) IV Push once  glucagon  Injectable 1 milliGRAM(s) IntraMuscular once  heparin   Injectable 5000 Unit(s) SubCutaneous every 12 hours  hydrALAZINE 100 milliGRAM(s) Oral three times a day  insulin glargine Injectable (LANTUS) 40 Unit(s) SubCutaneous at bedtime  insulin lispro (ADMELOG) corrective regimen sliding scale   SubCutaneous three times a day before meals  insulin lispro (ADMELOG) corrective regimen sliding scale   SubCutaneous at bedtime  insulin lispro Injectable (ADMELOG) 15 Unit(s) SubCutaneous three times a day before meals  sodium chloride 0.9% lock flush 3 milliLiter(s) IV Push every 8 hours    MEDICATIONS  (PRN):  dextrose Oral Gel 15 Gram(s) Oral once PRN Blood Glucose LESS THAN 70 milliGRAM(s)/deciliter      ROS  No fever, sweats, chills  No epistaxis, HA, sore throat  No CP, SOB, cough, sputum  No n/v/d, abd pain, melena, hematochezia  No edema  No rash  c/o anxiety  No back pain, joint pain  No bleeding, bruising  No dysuria, hematuria    Vital Signs Last 24 Hrs  T(C): 36.8 (14 Apr 2022 06:28), Max: 36.9 (13 Apr 2022 22:07)  T(F): 98.2 (14 Apr 2022 06:28), Max: 98.4 (13 Apr 2022 22:07)  HR: 103 (14 Apr 2022 06:28) (87 - 103)  BP: 129/75 (14 Apr 2022 06:28) (117/62 - 149/72)  BP(mean): --  RR: 16 (14 Apr 2022 06:28) (16 - 18)  SpO2: 100% (14 Apr 2022 06:28) (100% - 100%)    PE  NAD  Awake, alert  Anicteric, MMM  No c/c/e  No rash grossly                            7.8    5.77  )-----------( 305      ( 14 Apr 2022 07:16 )             23.1       04-14    137  |  104  |  37<H>  ----------------------------<  110<H>  5.2   |  23  |  2.06<H>    Ca    8.4      14 Apr 2022 07:16  Phos  5.4     04-14  Mg     2.30     04-14

## 2022-04-14 NOTE — PROGRESS NOTE ADULT - ASSESSMENT
Hematology/Oncology consulted on this 32 year old male with hx of Autism, HTN, DM and Lymphedema who presented to [facility] with complaints of SOB and cough who underwent a TTE revealing CHF and transferred to [facility] 4/4 for a cardiac cath and found to have weak iga band..    Elevated SPEP  --ZARA Kappa 8.31  --ZARA Jocelyn 4.31  --Immunofixation with a weak IgA band is present. Polyclonal kappa, Lambda, IgG and IgM seen, this is likely inflammatory  --repeat SPEP/UPEP,ZARA,QUIGGS and Iron studies as follows: -- repeat Iron studies as follows: TIBC 218, Folate/B12 nml, immunofixation with weak IgA band present, polyclonal Kappa,  Lambda, IgG and IgM present from 4/12    Anemia  --Hgb 7.8 today  --may be due to renal disease  --If acute drop, please transfuse for a Hgb < 7.0  --Ferritin 585, Iron sat 48%    SUSANA  --Creatinine 2.06 today  --renal biopsy pending for when blood pressure is better controlled,  will have  congo stain sent ( Spoke with Dr Verdin nephrologist)  - Consider additional imaging if monoclonal IgA persists  --renal following    CHF  --diuretics  --cardiac cath pending, need to optimize blood pressure  --Management per cardiology    Patient may follow with Dr Joseph of [facility] after discharge  Thank you for allowing us to participate in Mr Garcia's care    Lela Berkowitz NP  Hematology/Oncology  [facility]  [phone] (Office)  [phone] (Alt office)  Evenings and weekends please call MD on call or office

## 2022-04-14 NOTE — CHART NOTE - NSCHARTNOTEFT_GEN_A_CORE
Source: Patient A&Ox4 [x ]     other [ x] chart, RN     Nutrition f/u for extended Length Of Stay.   32y Male with history of HTN presents with SOB transferred for cardiac cath. Hospital course significant for new onset CHF, lymphedema, and SUSANA vs CKD. Cath planned for today.     Pt eating well. Denies any GI issues (nausea/vomiting/diarrhea/constipation.) Declined any nutrition education.      Diet, DASH/TLC:   Sodium & Cholesterol Restricted  No Concentrated Potassium  No Concentrated Phosphorus (04-12-22 @ 15:12)      GI: WDL. Last BM 4/13    PO intake:  % [x ]  other :      Anthropometrics: Height (cm): 185.4 (04-04), 185.4 (03-31)  Weight (kg): 172 (04-07), 165.9 (04-13)   pounds (156 kg)     Edema: 2+ B/L LE   Pressure Injuries: none    __________________ Pertinent Medications__________________   MEDICATIONS  (STANDING):  acetylcysteine  Oral Solution 1200 milliGRAM(s) Oral every 12 hours  atorvastatin 40 milliGRAM(s) Oral at bedtime  carvedilol 6.25 milliGRAM(s) Oral every 12 hours  chlorhexidine 2% Cloths 1 Application(s) Topical daily  dextrose 5%. 1000 milliLiter(s) (50 mL/Hr) IV Continuous <Continuous>  dextrose 5%. 1000 milliLiter(s) (100 mL/Hr) IV Continuous <Continuous>  dextrose 50% Injectable 25 Gram(s) IV Push once  dextrose 50% Injectable 12.5 Gram(s) IV Push once  dextrose 50% Injectable 25 Gram(s) IV Push once  glucagon  Injectable 1 milliGRAM(s) IntraMuscular once  heparin   Injectable 5000 Unit(s) SubCutaneous every 12 hours  hydrALAZINE 100 milliGRAM(s) Oral three times a day  insulin glargine Injectable (LANTUS) 40 Unit(s) SubCutaneous at bedtime  insulin lispro (ADMELOG) corrective regimen sliding scale   SubCutaneous three times a day before meals  insulin lispro (ADMELOG) corrective regimen sliding scale   SubCutaneous at bedtime  insulin lispro Injectable (ADMELOG) 15 Unit(s) SubCutaneous three times a day before meals  sodium chloride 0.9% lock flush 3 milliLiter(s) IV Push every 8 hours                                                                                                                                                                                                                                                                                                                                                                                                                                                                         __________________ Pertinent Labs__________________   04-14 Na137 mmol/L Glu 110 mg/dL<H> K+ 5.2 mmol/L Cr  2.06 mg/dL<H> BUN 37 mg/dL<H> 04-14 Phos 5.4 mg/dL<H> 04-12 Alb 2.68 g/dL<L> 04-01 Chol 252 mg/dL<H> LDL --    HDL 62 mg/dL Trig 98 mg/dL        POCT Blood Glucose.: 108 mg/dL (04-14-22 @ 08:29)  POCT Blood Glucose.: 137 mg/dL (04-13-22 @ 21:16)  POCT Blood Glucose.: 113 mg/dL (04-13-22 @ 17:17)  POCT Blood Glucose.: 114 mg/dL (04-13-22 @ 14:21)  POCT Blood Glucose.: 106 mg/dL (04-13-22 @ 13:46)  POCT Blood Glucose.: 84 mg/dL (04-13-22 @ 10:08)  POCT Blood Glucose.: 113 mg/dL (04-13-22 @ 09:20)  POCT Blood Glucose.: 145 mg/dL (04-13-22 @ 06:25)  POCT Blood Glucose.: 109 mg/dL (04-12-22 @ 21:34)  POCT Blood Glucose.: 144 mg/dL (04-12-22 @ 17:30)  POCT Blood Glucose.: 80 mg/dL (04-12-22 @ 12:10)  POCT Blood Glucose.: 76 mg/dL (04-12-22 @ 11:24)  POCT Blood Glucose.: 78 mg/dL (04-12-22 @ 08:43)  POCT Blood Glucose.: 127 mg/dL (04-11-22 @ 21:15)  POCT Blood Glucose.: 85 mg/dL (04-11-22 @ 17:47)  POCT Blood Glucose.: 172 mg/dL (04-11-22 @ 12:38)          Estimated Needs:   [x ] no change since previous assessment      Previous Nutrition Diagnosis: Obesity     Nutrition Diagnosis is [ x] ongoing     Recommendations:    1. Continue current diet.   2. Encourage PO intake and honor food preferences as able.         Monitoring and Evaluation:      [ x] Tolerance to diet prescription [x ] weights [x ] follow up per protocol  [ ] other:

## 2022-04-14 NOTE — PROVIDER CONTACT NOTE (OTHER) - ASSESSMENT
FS 89 then 108 then 106; Pt due for 40 unite Lantus
Pt Midline placed 4/4/22 during cath procedure due to previous IV infiltrating; Midline slightly difficult to flush, dressing changed due to old dressing slightly damp & bloody.
VSS on flowsheet; pt did not want to eat post cath
PT is AOx4 no s/s of hypoglycemia
FS before dinner 89. Patient received 15 units admelog premeal before lunch. Patient ate 100% of his lunch tray as well as a snack bought in from home. Patient A&Ox4 and states he will eat his dinner.
Pt stated he was feeling anxious because of his procedure, will cont to monitor

## 2022-04-14 NOTE — PROVIDER CONTACT NOTE (OTHER) - RECOMMENDATIONS
ACP notified to clarify which insulin to administer
BP meds given
old pre-meal 15 units of insulin
Patient educated on the importance of eating a full meal after rapid insulin administration. Patient verbalizes understanding and states he will eat a full dinner.

## 2022-04-14 NOTE — PROGRESS NOTE ADULT - NS ATTEND OPT1 GEN_ALL_CORE

## 2022-04-14 NOTE — PROVIDER CONTACT NOTE (OTHER) - REASON
B/P 176/94
before dinner fingerstick
Pt Midline access
FS 89 then 108 then 106; Pt due for 40 unite Lantus
Pre meal insulin not given as per ACP
Pt NPO - FS 90 - Hold pre-meal insulin of 15 Units

## 2022-04-15 ENCOUNTER — TRANSCRIPTION ENCOUNTER (OUTPATIENT)
Age: 32
End: 2022-04-15

## 2022-04-15 VITALS
DIASTOLIC BLOOD PRESSURE: 75 MMHG | RESPIRATION RATE: 18 BRPM | SYSTOLIC BLOOD PRESSURE: 140 MMHG | TEMPERATURE: 98 F | HEART RATE: 90 BPM | OXYGEN SATURATION: 100 %

## 2022-04-15 LAB
ANION GAP SERPL CALC-SCNC: 11 MMOL/L — SIGNIFICANT CHANGE UP (ref 7–14)
BUN SERPL-MCNC: 35 MG/DL — HIGH (ref 7–23)
CALCIUM SERPL-MCNC: 9.1 MG/DL — SIGNIFICANT CHANGE UP (ref 8.4–10.5)
CHLORIDE SERPL-SCNC: 103 MMOL/L — SIGNIFICANT CHANGE UP (ref 98–107)
CO2 SERPL-SCNC: 22 MMOL/L — SIGNIFICANT CHANGE UP (ref 22–31)
CREAT SERPL-MCNC: 2 MG/DL — HIGH (ref 0.5–1.3)
EGFR: 45 ML/MIN/1.73M2 — LOW
GLUCOSE BLDC GLUCOMTR-MCNC: 136 MG/DL — HIGH (ref 70–99)
GLUCOSE BLDC GLUCOMTR-MCNC: 161 MG/DL — HIGH (ref 70–99)
GLUCOSE SERPL-MCNC: 116 MG/DL — HIGH (ref 70–99)
HCT VFR BLD CALC: 26.9 % — LOW (ref 39–50)
HGB BLD-MCNC: 9 G/DL — LOW (ref 13–17)
MAGNESIUM SERPL-MCNC: 2.3 MG/DL — SIGNIFICANT CHANGE UP (ref 1.6–2.6)
MCHC RBC-ENTMCNC: 32.6 PG — SIGNIFICANT CHANGE UP (ref 27–34)
MCHC RBC-ENTMCNC: 33.5 GM/DL — SIGNIFICANT CHANGE UP (ref 32–36)
MCV RBC AUTO: 97.5 FL — SIGNIFICANT CHANGE UP (ref 80–100)
NRBC # BLD: 0 /100 WBCS — SIGNIFICANT CHANGE UP
NRBC # FLD: 0 K/UL — SIGNIFICANT CHANGE UP
PHOSPHATE SERPL-MCNC: 4.8 MG/DL — HIGH (ref 2.5–4.5)
PLATELET # BLD AUTO: 353 K/UL — SIGNIFICANT CHANGE UP (ref 150–400)
POTASSIUM SERPL-MCNC: 5.2 MMOL/L — SIGNIFICANT CHANGE UP (ref 3.5–5.3)
POTASSIUM SERPL-SCNC: 5.2 MMOL/L — SIGNIFICANT CHANGE UP (ref 3.5–5.3)
RBC # BLD: 2.76 M/UL — LOW (ref 4.2–5.8)
RBC # FLD: 12.6 % — SIGNIFICANT CHANGE UP (ref 10.3–14.5)
SODIUM SERPL-SCNC: 136 MMOL/L — SIGNIFICANT CHANGE UP (ref 135–145)
WBC # BLD: 6.34 K/UL — SIGNIFICANT CHANGE UP (ref 3.8–10.5)
WBC # FLD AUTO: 6.34 K/UL — SIGNIFICANT CHANGE UP (ref 3.8–10.5)

## 2022-04-15 RX ORDER — ASPIRIN/CALCIUM CARB/MAGNESIUM 324 MG
1 TABLET ORAL
Qty: 30 | Refills: 0
Start: 2022-04-15 | End: 2022-05-14

## 2022-04-15 RX ORDER — INSULIN LISPRO 100/ML
15 VIAL (ML) SUBCUTANEOUS
Qty: 5 | Refills: 0
Start: 2022-04-15 | End: 2022-05-14

## 2022-04-15 RX ORDER — ATORVASTATIN CALCIUM 80 MG/1
1 TABLET, FILM COATED ORAL
Qty: 30 | Refills: 0
Start: 2022-04-15 | End: 2022-05-14

## 2022-04-15 RX ORDER — ISOPROPYL ALCOHOL, BENZOCAINE .7; .06 ML/ML; ML/ML
1 SWAB TOPICAL
Qty: 100 | Refills: 1
Start: 2022-04-15 | End: 2022-06-03

## 2022-04-15 RX ORDER — INSULIN GLARGINE 100 [IU]/ML
35 INJECTION, SOLUTION SUBCUTANEOUS
Qty: 0 | Refills: 0 | DISCHARGE

## 2022-04-15 RX ORDER — INSULIN LISPRO 100/ML
15 VIAL (ML) SUBCUTANEOUS
Qty: 0 | Refills: 0 | DISCHARGE

## 2022-04-15 RX ORDER — CARVEDILOL PHOSPHATE 80 MG/1
1 CAPSULE, EXTENDED RELEASE ORAL
Qty: 60 | Refills: 0
Start: 2022-04-15 | End: 2022-05-14

## 2022-04-15 RX ORDER — LISINOPRIL 2.5 MG/1
1 TABLET ORAL
Qty: 0 | Refills: 0 | DISCHARGE

## 2022-04-15 RX ORDER — CARVEDILOL PHOSPHATE 80 MG/1
12.5 CAPSULE, EXTENDED RELEASE ORAL EVERY 12 HOURS
Refills: 0 | Status: DISCONTINUED | OUTPATIENT
Start: 2022-04-15 | End: 2022-04-15

## 2022-04-15 RX ORDER — INSULIN GLARGINE 100 [IU]/ML
35 INJECTION, SOLUTION SUBCUTANEOUS
Qty: 1 | Refills: 0
Start: 2022-04-15 | End: 2022-05-14

## 2022-04-15 RX ORDER — HYDRALAZINE HCL 50 MG
1 TABLET ORAL
Qty: 90 | Refills: 0
Start: 2022-04-15 | End: 2022-05-14

## 2022-04-15 RX ORDER — ASPIRIN/CALCIUM CARB/MAGNESIUM 324 MG
81 TABLET ORAL DAILY
Refills: 0 | Status: DISCONTINUED | OUTPATIENT
Start: 2022-04-15 | End: 2022-04-15

## 2022-04-15 RX ORDER — INSULIN LISPRO 100/ML
15 VIAL (ML) SUBCUTANEOUS
Qty: 1 | Refills: 0
Start: 2022-04-15 | End: 2022-05-14

## 2022-04-15 RX ADMIN — Medication 1200 MILLIGRAM(S): at 05:32

## 2022-04-15 RX ADMIN — Medication 100 MILLIGRAM(S): at 12:40

## 2022-04-15 RX ADMIN — Medication 100 MILLIGRAM(S): at 05:31

## 2022-04-15 RX ADMIN — HEPARIN SODIUM 5000 UNIT(S): 5000 INJECTION INTRAVENOUS; SUBCUTANEOUS at 05:31

## 2022-04-15 RX ADMIN — SODIUM CHLORIDE 3 MILLILITER(S): 9 INJECTION INTRAMUSCULAR; INTRAVENOUS; SUBCUTANEOUS at 06:37

## 2022-04-15 RX ADMIN — Medication 81 MILLIGRAM(S): at 12:43

## 2022-04-15 RX ADMIN — Medication 15 UNIT(S): at 08:48

## 2022-04-15 RX ADMIN — CARVEDILOL PHOSPHATE 6.25 MILLIGRAM(S): 80 CAPSULE, EXTENDED RELEASE ORAL at 05:31

## 2022-04-15 RX ADMIN — Medication 15 UNIT(S): at 12:39

## 2022-04-15 RX ADMIN — SODIUM CHLORIDE 3 MILLILITER(S): 9 INJECTION INTRAMUSCULAR; INTRAVENOUS; SUBCUTANEOUS at 14:26

## 2022-04-15 RX ADMIN — Medication 1: at 12:39

## 2022-04-15 NOTE — DISCHARGE NOTE PROVIDER - PROVIDER TOKENS
PROVIDER:[TOKEN:[[id number]]],PROVIDER:[TOKEN:[[id number]]],PROVIDER:[TOKEN:[[id number]]],PROVIDER:[TOKEN:[[id number]]]

## 2022-04-15 NOTE — DISCHARGE NOTE PROVIDER - DETAILS OF MALNUTRITION DIAGNOSIS/DIAGNOSES
This patient has been assessed with a concern for Malnutrition and was treated during this hospitalization for the following Nutrition diagnosis/diagnoses:     -  04/06/2022: Morbid obesity (BMI > 40)

## 2022-04-15 NOTE — DISCHARGE NOTE NURSING/CASE MANAGEMENT/SOCIAL WORK - PATIENT PORTAL LINK FT
You can access the FollowMyHealth Patient Portal offered by [facility] by registering at the following website: [email or web address]. By joining "[employer]’s FollowMyHealth portal, you will also be able to view your health information using other applications (apps) compatible with our system.

## 2022-04-15 NOTE — PROGRESS NOTE ADULT - ASSESSMENT
32y Male with history of HTN presents with SOB transferred for cardiac cath. Nephrology consulted for elevated Scr.    1) SUSANA: likely hemodynamically mediated in setting of diuresis and ACE-I use. Scr relatively stable despite cardiac cath on 4/14 (unknown contrast volume). UA with microscopic hematuria and subnephrotic range proteinuria. FeUrea low. Renal US negative for obstruction. Avoid nephrotoxins.    2) CKD Unspecified: Patient denies h/o CKD? but will need to confirm baseline with PMD (Dr. Luís Mast in [place]). Suspect patient with underlying diabetic nephropathy given microscopic hematuria and subnephrotic range proteinuria and large kidneys on renal US. Patient with positive serum ZARA for which Heme following. YESSICA positive but with negative dsDNA and complements making autoimmune disease unlikely. Kidney biopsy cancelled on 3 consecutive days for which will plan for outpatient IR guided kidney biopsy. Monitor electrolytes.    3) HTN with CKD: BP improving. Continue with current medications. Holding lisinopril. Monitor BP.    4) LE edema: Start torsemide 10 mg PO daily on discharge and will titrate as needed as ano outpatient. TTE with moderate global LVSD and diastolic dysfunction. Monitor UO.    5) Hyperkalemia: Resolved. Continue with low K diet. Monitor serum K.      [facility] NEPHROLOGY  Han Choudhury M.D.  Mekhi Verdin D.O.  Camille Brown M.D.  Lorena Wesley, MSN, ANP-C    Telephone: [phone]  Facsimile: [phone]    [address] [place] [address]

## 2022-04-15 NOTE — DISCHARGE NOTE PROVIDER - NSFOLLOWUPCLINICS_GEN_ALL_ED_FT
[facility]  Endocrinology  [address] [place] [address]  Phone: [phone]  Fax:     [facility]  Endocrinology  [address] [place] [address]  Phone: [phone]  Fax: [phone]

## 2022-04-15 NOTE — DISCHARGE NOTE NURSING/CASE MANAGEMENT/SOCIAL WORK - NSDCPEFALRISK_GEN_ALL_CORE
For information on Fall & Injury Prevention, visit: [email or web address] OR  [email or web address] OR  https://www.cdc.gov/steadi/patient.html

## 2022-04-15 NOTE — DISCHARGE NOTE PROVIDER - NSDCCPCAREPLAN_GEN_ALL_CORE_FT
PRINCIPAL DISCHARGE DIAGNOSIS  Diagnosis: Acute combined systolic and diastolic congestive heart failure  Assessment and Plan of Treatment: You were admitted to [facility] for heart catheterization. Heart catheterization was done 4/14 and medical management was recommended based on the results. You are to be started on torsemide 10 mg once a day. Please follow up with your primary care physician, nephrologist and cardiologist after discharge for continued monitoring and management.      SECONDARY DISCHARGE DIAGNOSES  Diagnosis: Diabetes mellitus  Assessment and Plan of Treatment: Continue consistent carbohydrate diet.  Monitor blood glucose levels throughout the day before meals and at bedtime.  Record blood sugars and bring to outpatient providers appointment in order to be reviewed by your doctor for management modifications.  Be aware of diabetes management symptoms including feeling cool and clammy may be related to low glucose levels.  Feeling hot and dry may indicate high glucose levels.  If  you feel these symptoms, check your blood sugar.  Make regular podiatry appointments in order to have feet checked for wounds and toe nails cut by a doctor to prevent infections.    Diagnosis: Lymphedema  Assessment and Plan of Treatment: Please follow up with lymphedema specialist    Diagnosis: SUSANA (acute kidney injury)  Assessment and Plan of Treatment: You were found with acute kidney injury. Nephrology was consulted. You were planned for renal biopsy, but was cancelled twice due to high blood pressure. Renal biopsy to be done outpatient and [place] red to be sent from biopsy. Please follow up with your primary care physician and nephrologist after discharge for continued monitoring and management.    Diagnosis: Other abnormal immunological findings in serum  Assessment and Plan of Treatment: You had a positive serum immunofixation on your blood work and hematology/oncology was consulted. Please follow up with your primary care physician and hematologist/oncologist after discharge for continued monitoring and management.

## 2022-04-15 NOTE — PROGRESS NOTE ADULT - SUBJECTIVE AND OBJECTIVE BOX
[facility] NEPHROLOGY- PROGRESS NOTE    32y Male with history of HTN presents with SOB transferred for cardiac cath. Nephrology consulted for elevated Scr.    REVIEW OF SYSTEMS:  Gen: no changes in weight  Cards: no chest pain  Resp: no dyspnea  GI: no nausea or vomiting or diarrhea  Vascular: + LE edema (chronic)    Keflex (Hives)      Hospital Medications: Medications reviewed      VITALS:  T(F): 98.2 (04-15-22 @ 04:50), Max: 98.6 (04-14-22 @ 12:09)  HR: 94 (04-15-22 @ 04:50)  BP: 135/64 (04-15-22 @ 04:50)  RR: 18 (04-15-22 @ 04:50)  SpO2: 98% (04-15-22 @ 04:50)  Wt(kg): --    04-14 @ 07:01  -  04-15 @ 07:00  --------------------------------------------------------  IN: 560 mL / OUT: 1700 mL / NET: -1140 mL        PHYSICAL EXAM:    Gen: NAD, calm  Cards: RRR, +S1/S2, no M/G/R  Resp: CTA B/L  GI: soft, NT/ND, NABS  Vascular: + LE lymphedema      LABS:  04-15    136  |  103  |  35<H>  ----------------------------<  116<H>  5.2   |  22  |  2.00<H>    Ca    9.1      15 Apr 2022 08:25  Phos  4.8     04-15  Mg     2.30     04-15      Creatinine Trend: 2.00 <--, 2.06 <--, 2.04 <--, 2.09 <--, 1.72 <--, 1.85 <--, 1.73 <--, 1.79 <--, 1.81 <--, 2.05 <--                        9.0    6.34  )-----------( 353      ( 15 Apr 2022 08:25 )             26.9     Urine Studies:

## 2022-04-15 NOTE — DISCHARGE NOTE PROVIDER - NSDCMRMEDTOKEN_GEN_ALL_CORE_FT
HumaLOG 100 units/mL subcutaneous solution: 15 unit(s) subcutaneous 3 times a day (with meals)  Lantus 100 units/mL subcutaneous solution: 35 unit(s) subcutaneous once a day (at bedtime)   alcohol swabs : Apply topically to affected area 4 times a day   aspirin 81 mg oral delayed release tablet: 1 tab(s) orally once a day  atorvastatin 40 mg oral tablet: 1 tab(s) orally once a day (at bedtime)  carvedilol 12.5 mg oral tablet: 1 tab(s) orally every 12 hours  glucometer (per patient&#x27;s insurance): Test blood sugars four times a day. Dispense #1 glucometer.  HumaLOG KwikPen 100 units/mL injectable solution: 15 unit(s) subcutaneous 3 times a day (with meals)   hydrALAZINE 100 mg oral tablet: 1 tab(s) orally 3 times a day  Insulin Pen Needles, 4mm: 1 application subcutaneously 4 times a day. ** Use with insulin pen **   lancets: 1 application subcutaneously 4 times a day   Lantus 100 units/mL subcutaneous solution: 35 unit(s) subcutaneous once a day (at bedtime)  test strips (per patient&#x27;s insurance): 1 application subcutaneously 4 times a day. ** Compatible with patient&#x27;s glucometer **  torsemide 10 mg oral tablet: 1 tab(s) orally once a day

## 2022-04-15 NOTE — DISCHARGE NOTE PROVIDER - CARE PROVIDER_API CALL
Mekhi Verdin (DO)  Internal Medicine  [address] [place] [address]  Phone: [phone]  Fax: [phone]  Follow Up Time:     Michelle Tracy)  Cardiovascular Disease; Internal Medicine; Interventional Cardiology  [address] [place] [address]  Phone: [phone]  Fax: [phone]  Follow Up Time:     Andreina Montero)  Internal Medicine  [address] [place] [address]  Phone: [phone]  Fax: [phone]  Follow Up Time:     Lloyd Rich)  Internal Medicine  [address] [place] [address]  Phone: [phone]  Fax: [phone]  Follow Up Time:

## 2022-04-15 NOTE — PROGRESS NOTE ADULT - SUBJECTIVE AND OBJECTIVE BOX
Patient is a 32y old  Male who presents with a chief complaint of SOB (2022 12:48)      MEDICATIONS  (STANDING):  aspirin enteric coated 81 milliGRAM(s) Oral daily  atorvastatin 40 milliGRAM(s) Oral at bedtime  carvedilol 12.5 milliGRAM(s) Oral every 12 hours  chlorhexidine 2% Cloths 1 Application(s) Topical daily  dextrose 5%. 1000 milliLiter(s) (50 mL/Hr) IV Continuous <Continuous>  dextrose 5%. 1000 milliLiter(s) (100 mL/Hr) IV Continuous <Continuous>  dextrose 50% Injectable 25 Gram(s) IV Push once  dextrose 50% Injectable 25 Gram(s) IV Push once  dextrose 50% Injectable 12.5 Gram(s) IV Push once  glucagon  Injectable 1 milliGRAM(s) IntraMuscular once  heparin   Injectable 5000 Unit(s) SubCutaneous every 12 hours  hydrALAZINE 100 milliGRAM(s) Oral three times a day  insulin glargine Injectable (LANTUS) 40 Unit(s) SubCutaneous at bedtime  insulin lispro (ADMELOG) corrective regimen sliding scale   SubCutaneous three times a day before meals  insulin lispro (ADMELOG) corrective regimen sliding scale   SubCutaneous at bedtime  insulin lispro Injectable (ADMELOG) 15 Unit(s) SubCutaneous three times a day before meals  sodium chloride 0.9% lock flush 3 milliLiter(s) IV Push every 8 hours    MEDICATIONS  (PRN):  dextrose Oral Gel 15 Gram(s) Oral once PRN Blood Glucose LESS THAN 70 milliGRAM(s)/deciliter      ROS  No fever, sweats, chills  No epistaxis, HA, sore throat  No CP, SOB, cough, sputum  No n/v/d, abd pain, melena, hematochezia  No edema  No rash  No anxiety  No back pain, joint pain  No bleeding, bruising  No dysuria, hematuria    Vital Signs Last 24 Hrs  T(C): 36.8 (15 Apr 2022 04:50), Max: 37 (2022 12:09)  T(F): 98.2 (15 Apr 2022 04:50), Max: 98.6 (2022 12:09)  HR: 94 (15 Apr 2022 04:50) (94 - 107)  BP: 135/64 (15 Apr 2022 04:50) (126/69 - 156/97)  BP(mean): --  RR: 18 (15 Apr 2022 04:50) (17 - 18)  SpO2: 98% (15 Apr 2022 04:50) (98% - 100%)    PE  NAD  Awake, alert  Anicteric, MMM  No c/c/e  No rash grossly                            9.0    6.34  )-----------( 353      ( 15 Apr 2022 08:25 )             26.9       04-15    136  |  103  |  35<H>  ----------------------------<  116<H>  5.2   |  22  |  2.00<H>    Ca    9.1      15 Apr 2022 08:25  Phos  4.8     04-15  Mg     2.30     04-15        [email or web address]   Study Date:     2022     Name:           AURORA SIMMONS   :            1990   (32 years)   Gender:         male   MR#:            [id number]   [facility]#:           [id number]   Patient Class:  Inpatient     2022     Dear Michelle Tracy MD,     Thank you for referring your patient AURORA SIMMONS, who  underwent a study at our facility on 2022.    Procedures:               1.    Arterial Access - Right Radial   2.    Diagnostic Coronary Angiography     The findings of this study were as follows:    Moderate atherosclerosis in mid/distal RCA.     Recommendations:   Aggressive medical therapy. Optimize medical therapy for stable  ischemic heart disease. Close monitoring of BUN/creatinine.    For complete exam details, please refer to the confirmed physician  report.    Thank you for allowing me to assist in the care of AURORA SIMMONS. Please do not hesitate to contact me with any  questions or concerns.

## 2022-04-15 NOTE — DISCHARGE NOTE PROVIDER - HOSPITAL COURSE
32 year old male with Autism (without limitations of functionality and intellect; able to work and sign his own consent per mother who is at bedside)HTN, diabetes mellitus type 2, lymphedema since age 12 that was recently diagnosed who presented to [facility] ED 3/31/22 complaining of SOB and cough x1week. Patient states that he has not taken his Lantus 35units suc bedtime/Lispro 15unites with meals or Lisinopril 40mg po daily as he ran out of refills and has been waiting for an appointment.  He has noted increased abdominal weight with tighter pants along with SOB walking (can not quantify).  Patient states he was about to take off on a flight to [place] when he started having SOB and a cough with productive of clear sputum while on the airplane on the [employer].  Flight crew activated EMS and patient was BIBA to [facility]. Patient R/O MI with negative troponins. Labs were remarkable for Cr 1.83 and BNP was 2900 for which he was started on IV Lasix diuresis for acute systolic and diastolic CHF and Ddimer 335 with unremarkable LE dopplers and V/Q scan with very low probability of PE. Denies chest pain, fever, chills, abd pain, N/V/D. Renal consulted for elevated Cr, likely due to DM2. Started on Mucomyst 1200mg po bid x4 doses in anticipation for cath. Underwent an transthoracic Echo revealing EF 40-45%, mod global LV systolic dysfunction, mod concentric LV hypertrophy, sev dil LA, no sig valvular disease. In light of patients cardiac risk factors, symptoms and abnormal noninvasive test findings the patient is now transferred to [facility] 4/4/22 for a cardiac catheterization to evaluate for ischemic etiology of CHF. MEDS at [facility]: Coreg 3.125mg po BID, ASA 81mg po daily, Lipitor 40mg po daily, lisinopril 40mg po daily, Lantus 40units subcutaneous bedtime, lasix 40mg IV BID, Lispro 15units subc TID meals. Denies fever, cough, chills, headache, flu like symptoms, sick contact or recent travel. COVID PCR not detected on 4/3/22    Patient admitted to [facility] for catheterization. Catheterization was done 4/14 and medical management was continued. Patient to be started on torsemide 10 mg qd and ACE to be held on discharge to be assessed when to re-start on discharge per renal. Patient had positive serum immunofixation and hematology/oncology was consulted. Patient was found with SUSANA on CKD. Nephrology was consulted. Patient was planned for renal biopsy, but was cancelled x 2 due to hypertension. Renal biopsy to be done outpatient and [place] red to be sent from biopsy. Patient found with hyperkalemia. Patient given hyperkalemia protocol and hyperkalemia resolved.     On 4/15/2022 this case was reviewed with Dr. Tracy, the patient is medically stable and optimized for discharge. All medications were reviewed and prescriptions were sent to mutually agreed upon pharmacy.          32 year old male with Autism (without limitations of functionality and intellect; able to work and sign his own consent per mother who is at bedside)HTN, diabetes mellitus type 2, lymphedema since age 12 that was recently diagnosed who presented to [facility] ED 3/31/22 complaining of SOB and cough x1week. Patient states that he has not taken his Lantus 35units suc bedtime/Lispro 15unites with meals or Lisinopril 40mg po daily as he ran out of refills and has been waiting for an appointment.  He has noted increased abdominal weight with tighter pants along with SOB walking (can not quantify).  Patient states he was about to take off on a flight to [place] when he started having SOB and a cough with productive of clear sputum while on the airplane on the [employer].  Flight crew activated EMS and patient was BIBA to [facility]. Patient R/O MI with negative troponins. Labs were remarkable for Cr 1.83 and BNP was 2900 for which he was started on IV Lasix diuresis for acute systolic and diastolic CHF and Ddimer 335 with unremarkable LE dopplers and V/Q scan with very low probability of PE. Denies chest pain, fever, chills, abd pain, N/V/D. Renal consulted for elevated Cr, likely due to DM2. Started on Mucomyst 1200mg po bid x4 doses in anticipation for cath. Underwent an transthoracic Echo revealing EF 40-45%, mod global LV systolic dysfunction, mod concentric LV hypertrophy, sev dil LA, no sig valvular disease. In light of patients cardiac risk factors, symptoms and abnormal noninvasive test findings the patient is now transferred to [facility] 4/4/22 for a cardiac catheterization to evaluate for ischemic etiology of CHF. MEDS at [facility]: Coreg 3.125mg po BID, ASA 81mg po daily, Lipitor 40mg po daily, lisinopril 40mg po daily, Lantus 40units subcutaneous bedtime, lasix 40mg IV BID, Lispro 15units subc TID meals. Denies fever, cough, chills, headache, flu like symptoms, sick contact or recent travel. COVID PCR not detected on 4/3/22    Patient admitted to [facility] for catheterization. Catheterization was done 4/14 and medical management was continued. Patient to be started on torsemide 10 mg qd and ACE to be held on discharge to be assessed when to re-start on discharge per renal. Patient had positive serum immunofixation and hematology/oncology was consulted. Patient was found with SUSANA on CKD. Nephrology was consulted. Patient was planned for renal biopsy, but was cancelled x 2 due to hypertension. Renal biopsy to be done outpatient and [place] red to be sent from biopsy. Patient found with hyperkalemia. Patient given hyperkalemia protocol and hyperkalemia resolved.      On 4/15/2022 this case was reviewed with Dr. Tracy, the patient is medically stable and optimized for discharge. All medications were reviewed and prescriptions were sent to mutually agreed upon pharmacy.

## 2022-04-15 NOTE — PROGRESS NOTE ADULT - ASSESSMENT
Hematology/Oncology consulted on this 32 year old male with hx of Autism, HTN, DM and Lymphedema who presented to [facility] with complaints of SOB and cough who underwent a TTE revealing CHF and transferred to [facility] 4/4 for a cardiac cath and found to have weak iga band..    Elevated SPEP  --ZARA Kappa 8.31  --ZARA Jocelyn 4.31  --Immunofixation with a weak IgA band is present. Polyclonal kappa, Lambda, IgG and IgM seen, this is likely inflammatory  --repeat SPEP/UPEP,ZARA,QUIGGS and Iron studies as follows: -- repeat Iron studies as follows: TIBC 218, Folate/B12 nml, immunofixation with weak IgA band present, polyclonal Kappa,  Lambda, IgG and IgM present from 4/12    Anemia  --Hgb 9.0 today  --may be due to renal disease  --If acute drop, please transfuse for a Hgb < 7.0  --Ferritin 585, Iron sat 48%    SUSANA  --Creatinine 2.00 today  --renal biopsy planned for outpatient bx , patient anxious and was unable to have inpatient bx,   please have congo stain sent ( Spoke with Dr Verdin nephrologist)  - Consider additional imaging if monoclonal IgA persists  --renal following    CHF  --diuretics  --4/14 S/P carcdiac cath as follows  The findings of this study were as follows:    Moderate atherosclerosis in mid/distal RCA.   Recommendations:   Aggressive medical therapy. Optimize medical therapy for stable  ischemic heart disease. Close monitoring of BUN/creatinine.--Management per cardiology    Patient may follow with Dr Joseph of [facility] after discharge  Thank you for allowing us to participate in Mr Garcia's care    Lela Berkowitz NP  Hematology/Oncology  [facility]  [phone] (Office)  [phone] (Alt office)  Evenings and weekends please call MD on call or office

## 2022-04-15 NOTE — PROGRESS NOTE ADULT - PROVIDER SPECIALTY LIST ADULT
Cardiology
Cardiology
Heme/Onc
Infectious Disease
Internal Medicine
Nephrology
Cardiology
Heme/Onc
Heme/Onc
Infectious Disease
Internal Medicine
Intervent Radiology
Nephrology
Cardiology
Nephrology

## 2022-04-15 NOTE — PROGRESS NOTE ADULT - SUBJECTIVE AND OBJECTIVE BOX
Patient is a 32y old  Male who presents with a chief complaint of SOB (2022 12:48)    PATIENT IS SEEN AND EXAMINED IN MEDICAL FLOOR.  NGT [    ]    SMALL [   ]      GT [   ]    ALLERGIES:  Keflex (Hives)      Daily     Daily Weight in k.4 (15 Apr 2022 04:50)    VITALS:    Vital Signs Last 24 Hrs  T(C): 36.8 (15 Apr 2022 04:50), Max: 37 (2022 12:09)  T(F): 98.2 (15 Apr 2022 04:50), Max: 98.6 (2022 12:09)  HR: 94 (15 Apr 2022 04:50) (94 - 107)  BP: 135/64 (15 Apr 2022 04:50) (126/69 - 156/97)  BP(mean): --  RR: 18 (15 Apr 2022 04:50) (17 - 18)  SpO2: 98% (15 Apr 2022 04:50) (98% - 100%)    LABS:    CBC Full  -  ( 15 Apr 2022 08:25 )  WBC Count : 6.34 K/uL  RBC Count : 2.76 M/uL  Hemoglobin : 9.0 g/dL  Hematocrit : 26.9 %  Platelet Count - Automated : 353 K/uL  Mean Cell Volume : 97.5 fL  Mean Cell Hemoglobin : 32.6 pg  Mean Cell Hemoglobin Concentration : 33.5 gm/dL  Auto Neutrophil # : x  Auto Lymphocyte # : x  Auto Monocyte # : x  Auto Eosinophil # : x  Auto Basophil # : x  Auto Neutrophil % : x  Auto Lymphocyte % : x  Auto Monocyte % : x  Auto Eosinophil % : x  Auto Basophil % : x      04-15    136  |  103  |  35<H>  ----------------------------<  116<H>  5.2   |  22  |  2.00<H>    Ca    9.1      15 Apr 2022 08:25  Phos  4.8     04-15  Mg     2.30     04-15      CAPILLARY BLOOD GLUCOSE      POCT Blood Glucose.: 136 mg/dL (15 Apr 2022 08:15)  POCT Blood Glucose.: 155 mg/dL (2022 21:14)  POCT Blood Glucose.: 156 mg/dL (2022 19:45)  POCT Blood Glucose.: 157 mg/dL (2022 16:04)  POCT Blood Glucose.: 90 mg/dL (2022 13:18)  POCT Blood Glucose.: 94 mg/dL (2022 12:21)          Creatinine Trend: 2.00<--, 2.06<--, 2.04<--, 2.09<--, 1.72<--, 1.85<--  I&O's Summary    2022 07:01  -  15 Apr 2022 07:00  --------------------------------------------------------  IN: 560 mL / OUT: 1700 mL / NET: -1140 mL            .Blood Blood-Venous   @ 19:45   No Growth Final  --  --      .Blood Blood-Peripheral   @ 19:30   No Growth Final  --  --          MEDICATIONS:    MEDICATIONS  (STANDING):  aspirin enteric coated 81 milliGRAM(s) Oral daily  atorvastatin 40 milliGRAM(s) Oral at bedtime  carvedilol 12.5 milliGRAM(s) Oral every 12 hours  chlorhexidine 2% Cloths 1 Application(s) Topical daily  dextrose 5%. 1000 milliLiter(s) (50 mL/Hr) IV Continuous <Continuous>  dextrose 5%. 1000 milliLiter(s) (100 mL/Hr) IV Continuous <Continuous>  dextrose 50% Injectable 25 Gram(s) IV Push once  dextrose 50% Injectable 25 Gram(s) IV Push once  dextrose 50% Injectable 12.5 Gram(s) IV Push once  glucagon  Injectable 1 milliGRAM(s) IntraMuscular once  heparin   Injectable 5000 Unit(s) SubCutaneous every 12 hours  hydrALAZINE 100 milliGRAM(s) Oral three times a day  insulin glargine Injectable (LANTUS) 40 Unit(s) SubCutaneous at bedtime  insulin lispro (ADMELOG) corrective regimen sliding scale   SubCutaneous three times a day before meals  insulin lispro (ADMELOG) corrective regimen sliding scale   SubCutaneous at bedtime  insulin lispro Injectable (ADMELOG) 15 Unit(s) SubCutaneous three times a day before meals  sodium chloride 0.9% lock flush 3 milliLiter(s) IV Push every 8 hours      MEDICATIONS  (PRN):  dextrose Oral Gel 15 Gram(s) Oral once PRN Blood Glucose LESS THAN 70 milliGRAM(s)/deciliter      REVIEW OF SYSTEMS:                           ALL ROS DONE [ X   ]    CONSTITUTIONAL:  LETHARGIC [   ], FEVER [   ], UNRESPONSIVE [   ]  CVS:  CP  [   ], SOB, [   ], PALPITATIONS [   ], DIZZYNESS [   ]  RS: COUGH [   ], SPUTUM [   ]  GI: ABDOMINAL PAIN [   ], NAUSEA [   ], VOMITINGS [   ], DIARRHEA [   ], CONSTIPATION [   ]  :  DYSURIA [   ], NOCTURIA [   ], INCREASED FREQUENCY [   ], DRIBLING [   ],  SKELETAL: PAINFUL JOINTS [   ], SWOLLEN JOINTS [   ], NECK ACHE [   ], LOW BACK ACHE [   ],  SKIN : ULCERS [   ], RASH [   ], ITCHING [   ]  CNS: HEAD ACHE [   ], DOUBLE VISION [   ], BLURRED VISION [   ], AMS / CONFUSION [   ], SEIZURES [   ], WEAKNESS [   ],TINGLING / NUMBNESS [   ]    PHYSICAL EXAMINATION:  GENERAL APPEARANCE: NO DISTRESS  HEENT:  NO PALLOR, NO  JVD,  NO   NODES, NECK SUPPLE  CVS: S1 +, S2 +,   RS: AEEB,  OCCASIONAL  RALES +,   NO RONCHI  ABD: SOFT, NT, NO, BS +  EXT: PE ++   LEFT FOREARM WOUND IMPROVED  SKIN: WARM,   SKELETAL:  ROM ACCEPTABLE  CNS:  AAO X 3    RADIOLOGY :    ACC: [id number] EXAM:  US DPLX LWR EXT VEINS COMPL BI                          PROCEDURE DATE:  2022          INTERPRETATION:  CLINICAL INFORMATION: Shortness of breath. Diabetes.    COMPARISON: None available.    TECHNIQUE: Duplex sonography of the BILATERAL LOWER extremity veins with   color and spectral Doppler, with and without compression. The examination   is technically limited secondary to subcutaneous edema in the calves and   body habitus.    FINDINGS:    RIGHT:  Normal compressibility of the RIGHT common femoral, femoral and popliteal   veins.  Doppler examination shows normal spontaneous and phasic flow.  Calf veins not seen.    Subcutaneous edema in the calf.    LEFT:  Normal compressibility of the LEFT common femoral, femoral and popliteal   veins.  Doppler examination shows normal spontaneous and phasic flow.  Calf veins not seen.    Subcutaneous edema in the calf.    IMPRESSION:    Technically limited study including nonvisualization of the calf veins   with no evidence of deep venous thrombosis in the visualized bilateral   lower extremity veins.    =========================    ACC: [id number] EXAM:  NM PULM VENTILATION PERFUS IMG                          PROCEDURE DATE:  2022          INTERPRETATION:  CLINICAL INFORMATION: 32 year old male with dyspnea,   elevated serum creatinine level; referred to evaluate for pulmonary   embolism.    RADIOPHARMACEUTICAL: 1.0 mCi Tc-99m-DTPA via inhalation; 6.2 mCi   Tc-99m-MAA, I.V.    TECHNIQUE:  Ventilation and perfusion images of the lungs were obtained   following administration of Tc-99m-DTPA and Tc-99m-MAA. Images were   obtained in the anterior, posterior, both lateral, and all 4 oblique   projections. The study was interpreted in conjunction with a chest   radiograph of 3/31/2022.    COMPARISON: No prior lung V/Q scan.    FINDINGS: There is heterogeneous distribution of radiopharmaceutical in   the lungs on the ventilation and perfusion images. There are no segmental   perfusion defects.    IMPRESSION: Very low probability of pulmonary embolus.      ASSESSMENT :       PLAN:  HPI:  32 year old male with Autism (without limitations of functionality and intellect; able to work and sign his own consent per mother who is at bedside)HTN, diabetes mellitus type 2, lymphedema since age 12 that was recently diagnosed who presented to [facility] ED 3/31/22 complaining of SOB and cough x1week. Patient states that he has not taken his Lantus 35units suc bedtime/Lispro 15unites with meals or Lisinopril 40mg po daily as he ran out of refills and has been waiting for an appointment.  He has noted increased abdominal weight with tighter pants along with SOB walking (can not quantify).  Patient states he was about to take off on a flight to [place] when he started having SOB and a cough with productive of clear sputum while on the airplane on the [place].  Flight crew activated EMS and patient was BIBA to [facility]. Patient R/O MI with negative troponins. Labs were remarkable for Cr 1.83 and BNP was 2900 for which he was started on IV Lasix diuresis for acute systolic and diastolic CHF and Ddimer 335 with unremarkable LE dopplers and V/Q scan with very low probability of PE. Denies chest pain, fever, chills, abd pain, N/V/D.   Renal consulted for elevated Cr, likely due to DM2. Started on Mucomyst 1200mg po bid x4 doses in anticipation for cath.  Underwent an transthoracic Echo revealing EF 40-45%, mod global LV systolic dysfunction, mod concentric LV hypertrophy, sev dil LA, no sig valvular disease.   In light of patients cardiac risk factors, symptoms and abnormal noninvasive test findings the patient is now transferred to [facility] 22 for a cardiac catheterization to evaluate for ischemic etiology of CHF.   MEDS at [facility]: Coreg 3.125mg po BID, ASA 81mg po daily, Lipitor 40mg po daily, lisinopril 40mg po daily, Lantus 40units subcutaneous bedtime, lasix 40mg IV BID, Lispro 15units subc TID meals    Denies fever, cough, chills, headache, flu like symptoms, sick contact or recent travel  COVID PCR not detected on 4/3/22    UA with protein and small blood. Check spot Upr/Cr3  Pt will eventually benefit from ACEi/ARB due to proteinuria and HF, once renal function is stable (post cath).    (2022 14:13)    # CASE D/W MOTHER, ALBERTO SIMMONS VIA PHONE @ [phone] [] - CASE DISCUSSED AT LENGTH, ALL QUESTIONS ANSWERED    # NORMOCYTIC ANEMIA - F/U ANEMIA PANEL  - TRANSFUSION THRESHOLD HGB < 7    # ? LEFT FOREARM PHLEBITIS [IMPROVING]  - BCX [NGTD], MONITOR SITE  - WARM COMPRESSES, ARM ELEVATION  - ID CONSULT IN PROGRESS    # SUSPECT NEW ONSET CHF - SYSTOLIC AND DIASTOLIC DYSFUNCTION  # HYPERTENSIVE URGENCY, HTN  # B/L LE EDEMA - SUSPECT S/T CHF AND ?VARICOSE VEINS  - MONITOR ON TELEMETRY, TRENDED TROPONINS  - STATIN, COREG; HOLDING DIURETICS  - HOLD ASA  - REVIEWED TSH/LIPID PANEL/HBA1C  - CARDIOLOGY CONSULT    - ECHO - MILD MR, DILATED LA, CONCENTRIC LVH, GLOBAL LV SYSTOLIC DYSFUNCTION, G1DD  - TRANSFERRED TO [facility] FOR CARDIAC CATH    - PLANNED FOR CARDIAC CATH []    # ELEVATED D-DIMER  - V/Q SCAN - LOW PROBABILITY PE  - DOPPLER LE - NEGATIVE FOR DVT    # POSITIVE SERUM IMMUNOFIXATION  - HEME/ONC CONSULT    # SUSANA ON LIKELY CKD - REVIEWED UA, MONITOR CR, AVOID NEPHROTOXIC AGENTS  - REVIEWED RENAL U/S  - NEPHROLOGY CONSULT IN PROGRESS    - F/U RENAL BX [DELAYED TO , GIVEN HYPERKALEMIA] ; PER IR  CASE CANCELLED DUE TO EPISODES OF HYPERTENSION  WHILE PATIENT IS ON PROCEDURE TABLE X 2    - D/W NEPHROLOGY - PLANNED FOR OUTPATIENT BIOPSY    # HYPERKALEMIA - PLACED ON LOKELMA  - [] - GIVEN INSULIN + D50, LASIX, 250MG IVF BOLUS    # SUSPECT CAMERON  - OUTPATIENT SLEEP STUDY    # MORBID OBESITY   - NUTRITION CONSULT    # DM2, NONCOMPLIANT W/ INSULIN - HBA1C 9.4 - LANTUS, SSI + FS ; DOSE ADJUST WHEN NPO    - HYPOGLYCEMIA, REDUCING DOSE OF INSULIN, MONITORING FINGERSTICKS    # HLD - STARTED STATIN, REVIEWED LIPID PANEL    # GI AND DVT PPX    Patient is a 32y old  Male who presents with a chief complaint of SOB (2022 12:48)    PATIENT IS SEEN AND EXAMINED IN MEDICAL FLOOR.      ALLERGIES:  Keflex (Hives)      Daily     Daily Weight in k.4 (15 Apr 2022 04:50)    VITALS:    Vital Signs Last 24 Hrs  T(C): 36.8 (15 Apr 2022 04:50), Max: 37 (2022 12:09)  T(F): 98.2 (15 Apr 2022 04:50), Max: 98.6 (2022 12:09)  HR: 94 (15 Apr 2022 04:50) (94 - 107)  BP: 135/64 (15 Apr 2022 04:50) (126/69 - 156/97)  BP(mean): --  RR: 18 (15 Apr 2022 04:50) (17 - 18)  SpO2: 98% (15 Apr 2022 04:50) (98% - 100%)    LABS:    CBC Full  -  ( 15 Apr 2022 08:25 )  WBC Count : 6.34 K/uL  RBC Count : 2.76 M/uL  Hemoglobin : 9.0 g/dL  Hematocrit : 26.9 %  Platelet Count - Automated : 353 K/uL  Mean Cell Volume : 97.5 fL  Mean Cell Hemoglobin : 32.6 pg  Mean Cell Hemoglobin Concentration : 33.5 gm/dL  Auto Neutrophil # : x  Auto Lymphocyte # : x  Auto Monocyte # : x  Auto Eosinophil # : x  Auto Basophil # : x  Auto Neutrophil % : x  Auto Lymphocyte % : x  Auto Monocyte % : x  Auto Eosinophil % : x  Auto Basophil % : x      04-15    136  |  103  |  35<H>  ----------------------------<  116<H>  5.2   |  22  |  2.00<H>    Ca    9.1      15 Apr 2022 08:25  Phos  4.8     04-15  Mg     2.30     04-15      CAPILLARY BLOOD GLUCOSE      POCT Blood Glucose.: 136 mg/dL (15 Apr 2022 08:15)  POCT Blood Glucose.: 155 mg/dL (2022 21:14)  POCT Blood Glucose.: 156 mg/dL (2022 19:45)  POCT Blood Glucose.: 157 mg/dL (2022 16:04)  POCT Blood Glucose.: 90 mg/dL (2022 13:18)  POCT Blood Glucose.: 94 mg/dL (2022 12:21)          Creatinine Trend: 2.00<--, 2.06<--, 2.04<--, 2.09<--, 1.72<--, 1.85<--  I&O's Summary    2022 07:01  -  15 Apr 2022 07:00  --------------------------------------------------------  IN: 560 mL / OUT: 1700 mL / NET: -1140 mL            .Blood Blood-Venous   @ 19:45   No Growth Final  --  --      .Blood Blood-Peripheral   @ 19:30   No Growth Final  --  --          MEDICATIONS:    MEDICATIONS  (STANDING):  aspirin enteric coated 81 milliGRAM(s) Oral daily  atorvastatin 40 milliGRAM(s) Oral at bedtime  carvedilol 12.5 milliGRAM(s) Oral every 12 hours  chlorhexidine 2% Cloths 1 Application(s) Topical daily  dextrose 5%. 1000 milliLiter(s) (50 mL/Hr) IV Continuous <Continuous>  dextrose 5%. 1000 milliLiter(s) (100 mL/Hr) IV Continuous <Continuous>  dextrose 50% Injectable 25 Gram(s) IV Push once  dextrose 50% Injectable 25 Gram(s) IV Push once  dextrose 50% Injectable 12.5 Gram(s) IV Push once  glucagon  Injectable 1 milliGRAM(s) IntraMuscular once  heparin   Injectable 5000 Unit(s) SubCutaneous every 12 hours  hydrALAZINE 100 milliGRAM(s) Oral three times a day  insulin glargine Injectable (LANTUS) 40 Unit(s) SubCutaneous at bedtime  insulin lispro (ADMELOG) corrective regimen sliding scale   SubCutaneous three times a day before meals  insulin lispro (ADMELOG) corrective regimen sliding scale   SubCutaneous at bedtime  insulin lispro Injectable (ADMELOG) 15 Unit(s) SubCutaneous three times a day before meals  sodium chloride 0.9% lock flush 3 milliLiter(s) IV Push every 8 hours      MEDICATIONS  (PRN):  dextrose Oral Gel 15 Gram(s) Oral once PRN Blood Glucose LESS THAN 70 milliGRAM(s)/deciliter      REVIEW OF SYSTEMS:                           ALL ROS DONE [ X   ]    CONSTITUTIONAL:  LETHARGIC [   ], FEVER [   ], UNRESPONSIVE [   ]  CVS:  CP  [   ], SOB, [   ], PALPITATIONS [   ], DIZZYNESS [   ]  RS: COUGH [   ], SPUTUM [   ]  GI: ABDOMINAL PAIN [   ], NAUSEA [   ], VOMITINGS [   ], DIARRHEA [   ], CONSTIPATION [   ]  :  DYSURIA [   ], NOCTURIA [   ], INCREASED FREQUENCY [   ], DRIBLING [   ],  SKELETAL: PAINFUL JOINTS [   ], SWOLLEN JOINTS [   ], NECK ACHE [   ], LOW BACK ACHE [   ],  SKIN : ULCERS [   ], RASH [   ], ITCHING [   ]  CNS: HEAD ACHE [   ], DOUBLE VISION [   ], BLURRED VISION [   ], AMS / CONFUSION [   ], SEIZURES [   ], WEAKNESS [   ],TINGLING / NUMBNESS [   ]    PHYSICAL EXAMINATION:  GENERAL APPEARANCE: NO DISTRESS  HEENT:  NO PALLOR, NO  JVD,  NO   NODES, NECK SUPPLE  CVS: S1 +, S2 +,   RS: AEEB,  OCCASIONAL  RALES +,   NO RONCHI  ABD: SOFT, NT, NO, BS +  EXT: PE ++   LEFT FOREARM WOUND IMPROVED  SKIN: WARM,   SKELETAL:  ROM ACCEPTABLE  CNS:  AAO X 3    RADIOLOGY :    ACC: [id number] EXAM:  US DPLX LWR EXT VEINS COMPL BI                          PROCEDURE DATE:  2022          INTERPRETATION:  CLINICAL INFORMATION: Shortness of breath. Diabetes.    COMPARISON: None available.    TECHNIQUE: Duplex sonography of the BILATERAL LOWER extremity veins with   color and spectral Doppler, with and without compression. The examination   is technically limited secondary to subcutaneous edema in the calves and   body habitus.    FINDINGS:    RIGHT:  Normal compressibility of the RIGHT common femoral, femoral and popliteal   veins.  Doppler examination shows normal spontaneous and phasic flow.  Calf veins not seen.    Subcutaneous edema in the calf.    LEFT:  Normal compressibility of the LEFT common femoral, femoral and popliteal   veins.  Doppler examination shows normal spontaneous and phasic flow.  Calf veins not seen.    Subcutaneous edema in the calf.    IMPRESSION:    Technically limited study including nonvisualization of the calf veins   with no evidence of deep venous thrombosis in the visualized bilateral   lower extremity veins.    =========================    ACC: [id number] EXAM:  NM PULM VENTILATION PERFUS IMG                          PROCEDURE DATE:  2022          INTERPRETATION:  CLINICAL INFORMATION: 32 year old male with dyspnea,   elevated serum creatinine level; referred to evaluate for pulmonary   embolism.    RADIOPHARMACEUTICAL: 1.0 mCi Tc-99m-DTPA via inhalation; 6.2 mCi   Tc-99m-MAA, I.V.    TECHNIQUE:  Ventilation and perfusion images of the lungs were obtained   following administration of Tc-99m-DTPA and Tc-99m-MAA. Images were   obtained in the anterior, posterior, both lateral, and all 4 oblique   projections. The study was interpreted in conjunction with a chest   radiograph of 3/31/2022.    COMPARISON: No prior lung V/Q scan.    FINDINGS: There is heterogeneous distribution of radiopharmaceutical in   the lungs on the ventilation and perfusion images. There are no segmental   perfusion defects.    IMPRESSION: Very low probability of pulmonary embolus.      ASSESSMENT :       PLAN:  HPI:  32 year old male with Autism (without limitations of functionality and intellect; able to work and sign his own consent per mother who is at bedside)HTN, diabetes mellitus type 2, lymphedema since age 12 that was recently diagnosed who presented to [facility] ED 3/31/22 complaining of SOB and cough x1week. Patient states that he has not taken his Lantus 35units suc bedtime/Lispro 15unites with meals or Lisinopril 40mg po daily as he ran out of refills and has been waiting for an appointment.  He has noted increased abdominal weight with tighter pants along with SOB walking (can not quantify).  Patient states he was about to take off on a flight to [place] when he started having SOB and a cough with productive of clear sputum while on the airplane on the [place].  Flight crew activated EMS and patient was BIBA to [facility]. Patient R/O MI with negative troponins. Labs were remarkable for Cr 1.83 and BNP was 2900 for which he was started on IV Lasix diuresis for acute systolic and diastolic CHF and Ddimer 335 with unremarkable LE dopplers and V/Q scan with very low probability of PE. Denies chest pain, fever, chills, abd pain, N/V/D.   Renal consulted for elevated Cr, likely due to DM2. Started on Mucomyst 1200mg po bid x4 doses in anticipation for cath.  Underwent an transthoracic Echo revealing EF 40-45%, mod global LV systolic dysfunction, mod concentric LV hypertrophy, sev dil LA, no sig valvular disease.   In light of patients cardiac risk factors, symptoms and abnormal noninvasive test findings the patient is now transferred to [facility] 22 for a cardiac catheterization to evaluate for ischemic etiology of CHF.   MEDS at [facility]: Coreg 3.125mg po BID, ASA 81mg po daily, Lipitor 40mg po daily, lisinopril 40mg po daily, Lantus 40units subcutaneous bedtime, lasix 40mg IV BID, Lispro 15units subc TID meals    Denies fever, cough, chills, headache, flu like symptoms, sick contact or recent travel  COVID PCR not detected on 4/3/22    UA with protein and small blood. Check spot Upr/Cr3  Pt will eventually benefit from ACEi/ARB due to proteinuria and HF, once renal function is stable (post cath).    (2022 14:13)    # CASE D/W MOTHER, ALBERTO SIMOMNS VIA PHONE @ [phone] [] - CASE DISCUSSED AT LENGTH, ALL QUESTIONS ANSWERED    # NORMOCYTIC ANEMIA - F/U ANEMIA PANEL  - TRANSFUSION THRESHOLD HGB < 7    # ? LEFT FOREARM PHLEBITIS [IMPROVING]  - BCX [NGTD], MONITOR SITE  - WARM COMPRESSES, ARM ELEVATION  - ID CONSULT IN PROGRESS    # SUSPECT NEW ONSET CHF - SYSTOLIC AND DIASTOLIC DYSFUNCTION - NONISCHEMIC CARDIOMYOPATHY  # HYPERTENSIVE URGENCY, HTN  # B/L LE EDEMA - SUSPECT S/T CHF AND ?VARICOSE VEINS  - MONITOR ON TELEMETRY, TRENDED TROPONINS  - STATIN, COREG; HOLDING DIURETICS  - HOLD ASA  - REVIEWED TSH/LIPID PANEL/HBA1C  - CARDIOLOGY CONSULT    - ECHO - MILD MR, DILATED LA, CONCENTRIC LVH, GLOBAL LV SYSTOLIC DYSFUNCTION, G1DD  - TRANSFERRED TO [facility] FOR CARDIAC CATH    - CARDIAC CATH [] - W/ MODERATE DISEASE OF RCA     # ELEVATED D-DIMER  - V/Q SCAN - LOW PROBABILITY PE  - DOPPLER LE - NEGATIVE FOR DVT    # POSITIVE SERUM IMMUNOFIXATION  - HEME/ONC CONSULT    # SUSANA ON LIKELY CKD - REVIEWED UA, MONITOR CR, AVOID NEPHROTOXIC AGENTS  - REVIEWED RENAL U/S  - NEPHROLOGY CONSULT IN PROGRESS    - F/U RENAL BX [DELAYED TO , GIVEN HYPERKALEMIA] ; PER IR  CASE CANCELLED DUE TO EPISODES OF HYPERTENSION  WHILE PATIENT IS ON PROCEDURE TABLE X 2    - D/W NEPHROLOGY - PLANNED FOR OUTPATIENT BIOPSY    # HYPERKALEMIA - PLACED ON LOKELMA  - [] - GIVEN INSULIN + D50, LASIX, 250MG IVF BOLUS  - LOW POTASSIUM DIET ON DISCHARGE; COUNSELLING GIVEN    # SUSPECT CAMERON  - OUTPATIENT SLEEP STUDY    # MORBID OBESITY   - NUTRITION CONSULT    # DM2, NONCOMPLIANT W/ INSULIN - HBA1C 9.4 - LANTUS, SSI + FS ; DOSE ADJUST WHEN NPO    - HYPOGLYCEMIA, REDUCING DOSE OF INSULIN, MONITORING FINGERSTICKS    # HLD - STARTED STATIN, REVIEWED LIPID PANEL    # GI AND DVT PPX    Patient is a 32y old  Male who presents with a chief complaint of SOB (2022 12:48)    PATIENT IS SEEN AND EXAMINED IN MEDICAL FLOOR.      ALLERGIES:  Keflex (Hives)      Daily     Daily Weight in k.4 (15 Apr 2022 04:50)    VITALS:    Vital Signs Last 24 Hrs  T(C): 36.8 (15 Apr 2022 04:50), Max: 37 (2022 12:09)  T(F): 98.2 (15 Apr 2022 04:50), Max: 98.6 (2022 12:09)  HR: 94 (15 Apr 2022 04:50) (94 - 107)  BP: 135/64 (15 Apr 2022 04:50) (126/69 - 156/97)  BP(mean): --  RR: 18 (15 Apr 2022 04:50) (17 - 18)  SpO2: 98% (15 Apr 2022 04:50) (98% - 100%)    LABS:    CBC Full  -  ( 15 Apr 2022 08:25 )  WBC Count : 6.34 K/uL  RBC Count : 2.76 M/uL  Hemoglobin : 9.0 g/dL  Hematocrit : 26.9 %  Platelet Count - Automated : 353 K/uL  Mean Cell Volume : 97.5 fL  Mean Cell Hemoglobin : 32.6 pg  Mean Cell Hemoglobin Concentration : 33.5 gm/dL  Auto Neutrophil # : x  Auto Lymphocyte # : x  Auto Monocyte # : x  Auto Eosinophil # : x  Auto Basophil # : x  Auto Neutrophil % : x  Auto Lymphocyte % : x  Auto Monocyte % : x  Auto Eosinophil % : x  Auto Basophil % : x      04-15    136  |  103  |  35<H>  ----------------------------<  116<H>  5.2   |  22  |  2.00<H>    Ca    9.1      15 Apr 2022 08:25  Phos  4.8     04-15  Mg     2.30     04-15      CAPILLARY BLOOD GLUCOSE      POCT Blood Glucose.: 136 mg/dL (15 Apr 2022 08:15)  POCT Blood Glucose.: 155 mg/dL (2022 21:14)  POCT Blood Glucose.: 156 mg/dL (2022 19:45)  POCT Blood Glucose.: 157 mg/dL (2022 16:04)  POCT Blood Glucose.: 90 mg/dL (2022 13:18)  POCT Blood Glucose.: 94 mg/dL (2022 12:21)          Creatinine Trend: 2.00<--, 2.06<--, 2.04<--, 2.09<--, 1.72<--, 1.85<--  I&O's Summary    2022 07:01  -  15 Apr 2022 07:00  --------------------------------------------------------  IN: 560 mL / OUT: 1700 mL / NET: -1140 mL            .Blood Blood-Venous   @ 19:45   No Growth Final  --  --      .Blood Blood-Peripheral   @ 19:30   No Growth Final  --  --          MEDICATIONS:    MEDICATIONS  (STANDING):  aspirin enteric coated 81 milliGRAM(s) Oral daily  atorvastatin 40 milliGRAM(s) Oral at bedtime  carvedilol 12.5 milliGRAM(s) Oral every 12 hours  chlorhexidine 2% Cloths 1 Application(s) Topical daily  dextrose 5%. 1000 milliLiter(s) (50 mL/Hr) IV Continuous <Continuous>  dextrose 5%. 1000 milliLiter(s) (100 mL/Hr) IV Continuous <Continuous>  dextrose 50% Injectable 25 Gram(s) IV Push once  dextrose 50% Injectable 25 Gram(s) IV Push once  dextrose 50% Injectable 12.5 Gram(s) IV Push once  glucagon  Injectable 1 milliGRAM(s) IntraMuscular once  heparin   Injectable 5000 Unit(s) SubCutaneous every 12 hours  hydrALAZINE 100 milliGRAM(s) Oral three times a day  insulin glargine Injectable (LANTUS) 40 Unit(s) SubCutaneous at bedtime  insulin lispro (ADMELOG) corrective regimen sliding scale   SubCutaneous three times a day before meals  insulin lispro (ADMELOG) corrective regimen sliding scale   SubCutaneous at bedtime  insulin lispro Injectable (ADMELOG) 15 Unit(s) SubCutaneous three times a day before meals  sodium chloride 0.9% lock flush 3 milliLiter(s) IV Push every 8 hours      MEDICATIONS  (PRN):  dextrose Oral Gel 15 Gram(s) Oral once PRN Blood Glucose LESS THAN 70 milliGRAM(s)/deciliter      REVIEW OF SYSTEMS:                           ALL ROS DONE [ X   ]    CONSTITUTIONAL:  LETHARGIC [   ], FEVER [   ], UNRESPONSIVE [   ]  CVS:  CP  [   ], SOB, [   ], PALPITATIONS [   ], DIZZYNESS [   ]  RS: COUGH [   ], SPUTUM [   ]  GI: ABDOMINAL PAIN [   ], NAUSEA [   ], VOMITINGS [   ], DIARRHEA [   ], CONSTIPATION [   ]  :  DYSURIA [   ], NOCTURIA [   ], INCREASED FREQUENCY [   ], DRIBLING [   ],  SKELETAL: PAINFUL JOINTS [   ], SWOLLEN JOINTS [   ], NECK ACHE [   ], LOW BACK ACHE [   ],  SKIN : ULCERS [   ], RASH [   ], ITCHING [   ]  CNS: HEAD ACHE [   ], DOUBLE VISION [   ], BLURRED VISION [   ], AMS / CONFUSION [   ], SEIZURES [   ], WEAKNESS [   ],TINGLING / NUMBNESS [   ]    PHYSICAL EXAMINATION:  GENERAL APPEARANCE: NO DISTRESS  HEENT:  NO PALLOR, NO  JVD,  NO   NODES, NECK SUPPLE  CVS: S1 +, S2 +,   RS: AEEB,  OCCASIONAL  RALES +,   NO RONCHI  ABD: SOFT, NT, NO, BS +  EXT: PE ++     SKIN: WARM,   SKELETAL:  ROM ACCEPTABLE  CNS:  AAO X 3    RADIOLOGY :    ACC: [id number] EXAM:  US DPLX LWR EXT VEINS COMPL BI                          PROCEDURE DATE:  2022          INTERPRETATION:  CLINICAL INFORMATION: Shortness of breath. Diabetes.    COMPARISON: None available.    TECHNIQUE: Duplex sonography of the BILATERAL LOWER extremity veins with   color and spectral Doppler, with and without compression. The examination   is technically limited secondary to subcutaneous edema in the calves and   body habitus.    FINDINGS:    RIGHT:  Normal compressibility of the RIGHT common femoral, femoral and popliteal   veins.  Doppler examination shows normal spontaneous and phasic flow.  Calf veins not seen.    Subcutaneous edema in the calf.    LEFT:  Normal compressibility of the LEFT common femoral, femoral and popliteal   veins.  Doppler examination shows normal spontaneous and phasic flow.  Calf veins not seen.    Subcutaneous edema in the calf.    IMPRESSION:    Technically limited study including nonvisualization of the calf veins   with no evidence of deep venous thrombosis in the visualized bilateral   lower extremity veins.    =========================    ACC: [id number] EXAM:  NM PULM VENTILATION PERFUS IMG                          PROCEDURE DATE:  2022          INTERPRETATION:  CLINICAL INFORMATION: 32 year old male with dyspnea,   elevated serum creatinine level; referred to evaluate for pulmonary   embolism.    RADIOPHARMACEUTICAL: 1.0 mCi Tc-99m-DTPA via inhalation; 6.2 mCi   Tc-99m-MAA, I.V.    TECHNIQUE:  Ventilation and perfusion images of the lungs were obtained   following administration of Tc-99m-DTPA and Tc-99m-MAA. Images were   obtained in the anterior, posterior, both lateral, and all 4 oblique   projections. The study was interpreted in conjunction with a chest   radiograph of 3/31/2022.    COMPARISON: No prior lung V/Q scan.    FINDINGS: There is heterogeneous distribution of radiopharmaceutical in   the lungs on the ventilation and perfusion images. There are no segmental   perfusion defects.    IMPRESSION: Very low probability of pulmonary embolus.      ASSESSMENT :       PLAN:  HPI:  32 year old male with Autism (without limitations of functionality and intellect; able to work and sign his own consent per mother who is at bedside)HTN, diabetes mellitus type 2, lymphedema since age 12 that was recently diagnosed who [facility] complaining of SOB and cough x1week. Patient states that he has not taken his Lantus 35units suc bedtime/Lispro 15unites with meals or Lisinopril 40mg po daily as he ran out of refills and has been waiting for an appointment.  He has noted increased abdominal weight with tighter pants along with SOB walking (can not quantify).  Patient states he was about to take off on a flight to [place] when he started having SOB and a cough with productive of clear sputum while on the airplane on the [employer].  Flight crew activated EMS and patient [facility] BIB[facility] to [facility]. Patient R/O MI with negative troponins. Labs were remarkable for Cr 1.83 and BNP was 2900 for which he was started on IV Lasix diuresis for acute systolic and diastolic CHF and Ddimer 335 with unremarkable LE dopplers and V/Q scan with very low probability of PE. Denies chest pain, fever, chills, abd pain, N/V/D.   Renal consulted for elevated Cr, likely due to DM2. Started on Mucomyst 1200mg po bid x4 doses in anticipation for cath.  Underwent an transthoracic Echo revealing EF 40-45%, mod global LV systolic dysfunction, mod concentric LV hypertrophy, sev dil LA, no sig valvular disease.   In light of patients cardiac risk factors, symptoms and abnormal noninvasive test findings the patient is now transferred to [facility] for a cardiac catheterization to evaluate for ischemic etiology of CHF.   MEDS at [facility]: Coreg 3.125mg po BID, ASA 81mg po daily, Lipitor 40mg po daily, lisinopril 40mg po daily, Lantus 40units subcutaneous bedtime, lasix 40mg IV BID, Lispro 15units subc TID meals    Denies fever, cough, chills, headache, flu like symptoms, sick contact or recent travel  COVID PCR not detected on 4/3/22    UA with protein and small blood. Check spot Upr/Cr3  Pt will eventually benefit from ACEi/ARB due to proteinuria and HF, once renal function is stable (post cath).    (2022 14:13)    # CASE D/W MOTHER, ALBERTO SIMMONS VIA PHONE @ [phone] [] - CASE DISCUSSED AT LENGTH, ALL QUESTIONS ANSWERED    # NORMOCYTIC ANEMIA - F/U ANEMIA PANEL  - TRANSFUSION THRESHOLD HGB < 7    # ? LEFT FOREARM PHLEBITIS [IMPROVING]  - BCX [NGTD], MONITOR SITE  - WARM COMPRESSES, ARM ELEVATION  - ID CONSULT IN PROGRESS    # SUSPECT NEW ONSET CHF - SYSTOLIC AND DIASTOLIC DYSFUNCTION - NONISCHEMIC CARDIOMYOPATHY  # HYPERTENSIVE URGENCY, HTN  # B/L LE EDEMA - SUSPECT S/T CHF AND ?VARICOSE VEINS  - MONITOR ON TELEMETRY, TRENDED TROPONINS  - STATIN, COREG; HOLDING DIURETICS  - HOLD ASA  - REVIEWED TSH/LIPID PANEL/HBA1C  - CARDIOLOGY CONSULT    - ECHO - MILD MR, DILATED LA, CONCENTRIC LVH, GLOBAL LV SYSTOLIC DYSFUNCTION, G1DD  - TRANSFERRED TO [facility] FOR CARDIAC CATH    - CARDIAC CATH [] - W/ MODERATE DISEASE OF RCA     # ELEVATED D-DIMER  - V/Q SCAN - LOW PROBABILITY PE  - DOPPLER LE - NEGATIVE FOR DVT    # POSITIVE SERUM IMMUNOFIXATION  - HEME/ONC CONSULT    # SUSANA ON LIKELY CKD - REVIEWED UA, MONITOR CR, AVOID NEPHROTOXIC AGENTS  - REVIEWED RENAL U/S  - NEPHROLOGY CONSULT IN PROGRESS    - F/U RENAL BX [DELAYED TO , GIVEN HYPERKALEMIA] ; PER IR  CASE CANCELLED DUE TO EPISODES OF HYPERTENSION  WHILE PATIENT IS ON PROCEDURE TABLE X 2    - D/W NEPHROLOGY - PLANNED FOR OUTPATIENT BIOPSY    # HYPERKALEMIA - PLACED ON LOKELMA  - [] - GIVEN INSULIN + D50, LASIX, 250MG IVF BOLUS  - LOW POTASSIUM DIET ON DISCHARGE; COUNSELLING GIVEN    # SUSPECT CAMERON  - OUTPATIENT SLEEP STUDY    # MORBID OBESITY   - NUTRITION CONSULT    # DM2, NONCOMPLIANT W/ INSULIN - HBA1C 9.4 - LANTUS, SSI + FS ; DOSE ADJUST WHEN NPO    - HYPOGLYCEMIA, REDUCING DOSE OF INSULIN, MONITORING FINGERSTICKS    # HLD - STARTED STATIN, REVIEWED LIPID PANEL    # GI AND DVT PPX

## 2022-04-15 NOTE — PROGRESS NOTE ADULT - SUBJECTIVE AND OBJECTIVE BOX
CARDIOLOGY ATTENDING    tele: NSR    he denies palpitations, syncope, nor angina, ROS otherwise -       DATE OF SERVICE - 04-15-22     Review of Systems:   Constitutional: [ ] fevers, [ ] chills.   Skin: [ ] dry skin. [ ] rashes.  Psychiatric: [ ] depression, [ ] anxiety.   Gastrointestinal: [ ] BRBPR, [ ] melena.   Neurological: [ ] confusion. [ ] seizures. [ ] shuffling gait.   Ears,Nose,Mouth and Throat: [ ] ear pain [ ] sore throat.   Eyes: [ ] diplopia.   Respiratory: [ ] hemoptysis. [ ] shortness of breath  Cardiovascular: See HPI above  Hematologic/Lymphatic: [ ] anemia. [ ] painful nodes. [ ] prolonged bleeding.   Genitourinary: [ ] hematuria. [ ] flank pain.   Endocrine: [ ] significant change in weight. [ ] intolerance to heat and cold.     Review of systems [ x] otherwise negative, [ ] otherwise unable to obtain    FH: no family history of sudden cardiac death in first degree relatives    SH: [ ] tobacco, [ ] alcohol, [ ] drugs    atorvastatin 40 milliGRAM(s) Oral at bedtime  carvedilol 6.25 milliGRAM(s) Oral every 12 hours  chlorhexidine 2% Cloths 1 Application(s) Topical daily  dextrose 5%. 1000 milliLiter(s) IV Continuous <Continuous>  dextrose 5%. 1000 milliLiter(s) IV Continuous <Continuous>  dextrose 50% Injectable 25 Gram(s) IV Push once  dextrose 50% Injectable 12.5 Gram(s) IV Push once  dextrose 50% Injectable 25 Gram(s) IV Push once  dextrose Oral Gel 15 Gram(s) Oral once PRN  glucagon  Injectable 1 milliGRAM(s) IntraMuscular once  heparin   Injectable 5000 Unit(s) SubCutaneous every 12 hours  hydrALAZINE 100 milliGRAM(s) Oral three times a day  insulin glargine Injectable (LANTUS) 40 Unit(s) SubCutaneous at bedtime  insulin lispro (ADMELOG) corrective regimen sliding scale   SubCutaneous three times a day before meals  insulin lispro (ADMELOG) corrective regimen sliding scale   SubCutaneous at bedtime  insulin lispro Injectable (ADMELOG) 15 Unit(s) SubCutaneous three times a day before meals  sodium chloride 0.9% lock flush 3 milliLiter(s) IV Push every 8 hours                            9.0    6.34  )-----------( 353      ( 15 Apr 2022 08:25 )             26.9       04-14    137  |  104  |  37<H>  ----------------------------<  110<H>  5.2   |  23  |  2.06<H>    Ca    8.4      14 Apr 2022 07:16  Phos  5.4     04-14  Mg     2.30     04-14              T(C): 36.8 (04-15-22 @ 04:50), Max: 37 (04-14-22 @ 12:09)  HR: 94 (04-15-22 @ 04:50) (94 - 107)  BP: 135/64 (04-15-22 @ 04:50) (126/69 - 156/97)  RR: 18 (04-15-22 @ 04:50) (17 - 18)  SpO2: 98% (04-15-22 @ 04:50) (98% - 100%)  Wt(kg): --    I&O's Summary    14 Apr 2022 07:01  -  15 Apr 2022 07:00  --------------------------------------------------------  IN: 560 mL / OUT: 1700 mL / NET: -1140 mL        General: Well nourished in no acute distress. Alert and Oriented * 3.   Head: Normocephalic and atraumatic.   Neck: No JVD. No bruits. Supple. Does not appear to be enlarged.   Cardiovascular: + S1,S2 ; RRR Soft systolic murmur at the left lower sternal border. No rubs noted.    Lungs: CTA b/l. No rhonchi, rales or wheezes.   Abdomen: + BS, soft. Non tender. Non distended. No rebound. No guarding.   Extremities: No clubbing/cyanosis/edema.   Neurologic: Moves all four extremities. Full range of motion.   Skin: Warm and moist. The patient's skin has normal elasticity and good skin turgor.   Psychiatric: Appropriate mood and affect.  Musculoskeletal: Normal range of motion, normal strength      A/P) 33 y/o male PMH CKD, hypertension, hyperlipidemia a/w moderate LV dysfunction. Cath showed only moderate disease of RCA consistent with a non-ischemic cardiomyopathy    -increase coreg to 12.5mg bid  -f/u renal, can we add an ACE inhibitor given CKD?  -continue lipitor and hydralazine  -add asa 81mg daily given non-obstructive CAD  -d/c planning if ok with renal

## 2022-04-22 PROBLEM — I50.41 ACUTE COMBINED SYSTOLIC (CONGESTIVE) AND DIASTOLIC (CONGESTIVE) HEART FAILURE: Chronic | Status: ACTIVE | Noted: 2022-04-04

## 2022-04-22 PROBLEM — F84.0 AUTISTIC DISORDER: Chronic | Status: ACTIVE | Noted: 2022-04-04

## 2022-04-22 PROBLEM — Z00.00 ENCOUNTER FOR PREVENTIVE HEALTH EXAMINATION: Status: ACTIVE | Noted: 2022-04-22

## 2022-04-22 PROBLEM — I89.0 LYMPHEDEMA, NOT ELSEWHERE CLASSIFIED: Chronic | Status: ACTIVE | Noted: 2022-04-04

## 2022-04-22 PROBLEM — E66.01 MORBID (SEVERE) OBESITY DUE TO EXCESS CALORIES: Chronic | Status: ACTIVE | Noted: 2022-04-04

## 2022-04-22 NOTE — POST DISCHARGE NOTE - RECOMMENDATION:
Gave information about patient's recent admission to [facility] then transfer to [facility] for cardiac catheterization.

## 2022-04-22 NOTE — POST DISCHARGE NOTE - NOTIFICATION:
Got a call as LILIANA in ED from resident Dr. Timothy Trevino (EM resident) at [facility] to obtain information about patient as family was unsure about all the details of patient's recent admission here to [facility]. Requesting patient's recent discharge summary. I was able to get on the phone with patient and his mother who both gave consent for me to access patient's chart prior to doing so.

## 2022-05-10 ENCOUNTER — APPOINTMENT (OUTPATIENT)
Dept: HEART AND VASCULAR | Facility: CLINIC | Age: 32
End: 2022-05-10

## 2023-05-11 ENCOUNTER — APPOINTMENT (OUTPATIENT)
Dept: VASCULAR SURGERY | Facility: CLINIC | Age: 33
End: 2023-05-11
Payer: MEDICAID

## 2023-05-11 VITALS — SYSTOLIC BLOOD PRESSURE: 238 MMHG | HEART RATE: 92 BPM | DIASTOLIC BLOOD PRESSURE: 128 MMHG

## 2023-05-11 VITALS — HEIGHT: 73 IN | BODY MASS INDEX: 41.75 KG/M2 | WEIGHT: 315 LBS

## 2023-05-11 DIAGNOSIS — I10 ESSENTIAL (PRIMARY) HYPERTENSION: ICD-10-CM

## 2023-05-11 DIAGNOSIS — Z78.9 OTHER SPECIFIED HEALTH STATUS: ICD-10-CM

## 2023-05-11 DIAGNOSIS — K59.00 CONSTIPATION, UNSPECIFIED: ICD-10-CM

## 2023-05-11 PROCEDURE — 99203 OFFICE O/P NEW LOW 30 MIN: CPT

## 2023-05-11 RX ORDER — SODIUM CITRATE AND CITRIC ACID MONOHYDRATE 500; 334 MG/5ML; MG/5ML
334-500 SOLUTION ORAL
Refills: 0 | Status: ACTIVE | COMMUNITY

## 2023-05-11 RX ORDER — LABETALOL HYDROCHLORIDE 200 MG/1
200 TABLET, FILM COATED ORAL
Refills: 0 | Status: ACTIVE | COMMUNITY

## 2023-05-11 RX ORDER — POLYETHYLENE GLYCOL 400
LIQUID (ML) MISCELLANEOUS
Refills: 0 | Status: ACTIVE | COMMUNITY

## 2023-05-11 RX ORDER — NIFEDIPINE 90 MG/1
90 TABLET, EXTENDED RELEASE ORAL
Refills: 0 | Status: ACTIVE | COMMUNITY

## 2023-05-11 NOTE — REVIEW OF SYSTEMS
[Limb Swelling] : limb swelling
No. ISAURA screening performed.  STOP BANG Legend: 0-2 = LOW Risk; 3-4 = INTERMEDIATE Risk; 5-8 = HIGH Risk

## 2023-05-11 NOTE — ASSESSMENT
[FreeTextEntry1] : 32 y/o gentleman with bilateral LE lymphedema.\par \par Continue with compression therapy.\par \par I referred Dr. Wang for lymphedema treatment.

## 2023-05-11 NOTE — HISTORY OF PRESENT ILLNESS
[FreeTextEntry1] : 34 y/o gentleman with h/o upper and lower extremity lymphedema since the age of 10 years old. Was using the lymphapress pumps, swelling has worsened and now the sleeves of the pumps do not fit, no h/o DVT or venous ulcers.\par \par He underwent left heel surgery in February 2023 and still has a wound.

## 2023-05-24 ENCOUNTER — APPOINTMENT (OUTPATIENT)
Dept: VASCULAR SURGERY | Facility: CLINIC | Age: 33
End: 2023-05-24
Payer: MEDICAID

## 2023-05-24 DIAGNOSIS — I89.0 LYMPHEDEMA, NOT ELSEWHERE CLASSIFIED: ICD-10-CM

## 2023-05-24 PROCEDURE — 99214 OFFICE O/P EST MOD 30 MIN: CPT

## 2024-03-26 NOTE — DIETITIAN INITIAL EVALUATION ADULT - NSICDXPASTMEDICALHX_GEN_ALL_CORE_FT
[FreeTextEntry1] : conversive management of the testis/epididymal issue needs PSA PAST MEDICAL HISTORY:  Acute combined systolic and diastolic congestive heart failure     Autism without limitations of functionality and intellect; able to work and sign his own consent per mother who is at bedside    Diabetes mellitus     Hypertension     Lymphedema     Morbidly obese

## 2024-09-26 NOTE — DISCHARGE NOTE NURSING/CASE MANAGEMENT/SOCIAL WORK - NSFLUVACAGEDISCH_IMM_ALL_CORE
What Type Of Note Output Would You Prefer (Optional)?: Standard Output Adult How Severe Is Your Rash?: moderate Is This A New Presentation, Or A Follow-Up?: Rash normal